# Patient Record
Sex: FEMALE | Race: WHITE | NOT HISPANIC OR LATINO | Employment: FULL TIME | URBAN - METROPOLITAN AREA
[De-identification: names, ages, dates, MRNs, and addresses within clinical notes are randomized per-mention and may not be internally consistent; named-entity substitution may affect disease eponyms.]

---

## 2017-01-31 ENCOUNTER — CONVERSION ENCOUNTER (OUTPATIENT)
Dept: GENERAL RADIOLOGY | Facility: HOSPITAL | Age: 47
End: 2017-01-31

## 2018-02-13 ENCOUNTER — CONVERSION ENCOUNTER (OUTPATIENT)
Dept: GENERAL RADIOLOGY | Facility: HOSPITAL | Age: 48
End: 2018-02-13

## 2018-04-02 ENCOUNTER — OFFICE VISIT CONVERTED (OUTPATIENT)
Dept: ORTHOPEDIC SURGERY | Facility: CLINIC | Age: 48
End: 2018-04-02
Attending: ORTHOPAEDIC SURGERY

## 2018-04-02 ENCOUNTER — CONVERSION ENCOUNTER (OUTPATIENT)
Dept: ORTHOPEDIC SURGERY | Facility: CLINIC | Age: 48
End: 2018-04-02

## 2018-07-05 ENCOUNTER — CONVERSION ENCOUNTER (OUTPATIENT)
Dept: NEUROLOGY | Facility: CLINIC | Age: 48
End: 2018-07-05

## 2018-07-05 ENCOUNTER — OFFICE VISIT CONVERTED (OUTPATIENT)
Dept: NEUROSURGERY | Facility: CLINIC | Age: 48
End: 2018-07-05
Attending: PHYSICIAN ASSISTANT

## 2018-08-16 ENCOUNTER — OFFICE VISIT CONVERTED (OUTPATIENT)
Dept: NEUROSURGERY | Facility: CLINIC | Age: 48
End: 2018-08-16
Attending: PHYSICIAN ASSISTANT

## 2018-11-05 ENCOUNTER — OFFICE VISIT CONVERTED (OUTPATIENT)
Dept: FAMILY MEDICINE CLINIC | Facility: CLINIC | Age: 48
End: 2018-11-05
Attending: NURSE PRACTITIONER

## 2018-12-03 ENCOUNTER — OFFICE VISIT CONVERTED (OUTPATIENT)
Dept: FAMILY MEDICINE CLINIC | Facility: CLINIC | Age: 48
End: 2018-12-03
Attending: NURSE PRACTITIONER

## 2019-02-05 ENCOUNTER — OFFICE VISIT CONVERTED (OUTPATIENT)
Dept: FAMILY MEDICINE CLINIC | Facility: CLINIC | Age: 49
End: 2019-02-05
Attending: NURSE PRACTITIONER

## 2019-02-06 ENCOUNTER — HOSPITAL ENCOUNTER (OUTPATIENT)
Dept: LAB | Facility: HOSPITAL | Age: 49
Discharge: HOME | End: 2019-02-06
Attending: NURSE PRACTITIONER

## 2019-02-06 LAB
APPEARANCE UR: ABNORMAL
BILIRUB UR QL: ABNORMAL
COLOR UR: ABNORMAL
CONV BACTERIA: NEGATIVE
CONV COLLECTION SOURCE (UA): ABNORMAL
CONV CRYSTALS: ABNORMAL /[HPF]
CONV HYALINE CASTS IN URINE MICRO: ABNORMAL /[LPF]
CONV UROBILINOGEN IN URINE BY AUTOMATED TEST STRIP: 0.2 {EHRLICHU}/DL (ref 0.1–1)
EST. AVERAGE GLUCOSE BLD GHB EST-MCNC: 183 MG/DL
GLUCOSE UR QL: 500 MG/DL
HBA1C MFR BLD: 8 % (ref 3.5–5.7)
HGB UR QL STRIP: ABNORMAL
KETONES UR QL STRIP: ABNORMAL MG/DL
LEUKOCYTE ESTERASE UR QL STRIP: NEGATIVE
NITRITE UR QL STRIP: NEGATIVE
PH UR STRIP.AUTO: 5 [PH] (ref 5–8)
PROT UR QL: NEGATIVE MG/DL
RBC #/AREA URNS HPF: ABNORMAL /[HPF]
SP GR UR: 1.03 (ref 1–1.03)
SQUAMOUS SPT QL MICRO: ABNORMAL /[HPF]
WBC #/AREA URNS HPF: ABNORMAL /[HPF]

## 2019-02-08 LAB — BACTERIA UR CULT: NORMAL

## 2019-02-15 ENCOUNTER — HOSPITAL ENCOUNTER (OUTPATIENT)
Dept: GENERAL RADIOLOGY | Facility: HOSPITAL | Age: 49
Discharge: HOME | End: 2019-02-15
Attending: NURSE PRACTITIONER

## 2019-03-20 ENCOUNTER — OFFICE VISIT CONVERTED (OUTPATIENT)
Dept: FAMILY MEDICINE CLINIC | Facility: CLINIC | Age: 49
End: 2019-03-20
Attending: NURSE PRACTITIONER

## 2019-04-01 ENCOUNTER — HOSPITAL ENCOUNTER (OUTPATIENT)
Dept: LAB | Facility: HOSPITAL | Age: 49
Discharge: HOME | End: 2019-04-01
Attending: NURSE PRACTITIONER

## 2019-04-01 LAB
APPEARANCE UR: CLEAR
BILIRUB UR QL: NEGATIVE
COLOR UR: YELLOW
CONV COLLECTION SOURCE (UA): ABNORMAL
CONV UROBILINOGEN IN URINE BY AUTOMATED TEST STRIP: 0.2 {EHRLICHU}/DL (ref 0.1–1)
GLUCOSE UR QL: >=1000 MG/DL
HGB UR QL STRIP: NEGATIVE
KETONES UR QL STRIP: ABNORMAL MG/DL
LEUKOCYTE ESTERASE UR QL STRIP: NEGATIVE
NITRITE UR QL STRIP: NEGATIVE
PH UR STRIP.AUTO: 5 [PH] (ref 5–8)
PROT UR QL: NEGATIVE MG/DL
SP GR UR: 1.03 (ref 1–1.03)

## 2019-04-04 ENCOUNTER — HOSPITAL ENCOUNTER (OUTPATIENT)
Dept: LAB | Facility: HOSPITAL | Age: 49
Discharge: HOME | End: 2019-04-04
Attending: SPECIALIST

## 2019-04-04 LAB — CORTIS AM PEAK SERPL-MCNC: <1 UG/DL (ref 6–18.4)

## 2019-05-08 ENCOUNTER — OFFICE VISIT CONVERTED (OUTPATIENT)
Dept: FAMILY MEDICINE CLINIC | Facility: CLINIC | Age: 49
End: 2019-05-08
Attending: NURSE PRACTITIONER

## 2019-11-05 ENCOUNTER — HOSPITAL ENCOUNTER (OUTPATIENT)
Dept: LAB | Facility: HOSPITAL | Age: 49
Discharge: HOME | End: 2019-11-05
Attending: NURSE PRACTITIONER

## 2019-11-05 LAB
ALBUMIN SERPL-MCNC: 4.5 G/DL (ref 3.5–5)
ALBUMIN/GLOB SERPL: 1.9 {RATIO} (ref 1.4–2.6)
ALP SERPL-CCNC: 94 U/L (ref 42–98)
ALT SERPL-CCNC: 19 U/L (ref 10–40)
ANION GAP SERPL CALC-SCNC: 18 MMOL/L (ref 8–19)
APPEARANCE UR: ABNORMAL
AST SERPL-CCNC: 19 U/L (ref 15–50)
BILIRUB SERPL-MCNC: 1.3 MG/DL (ref 0.2–1.3)
BILIRUB UR QL: ABNORMAL
BUN SERPL-MCNC: 10 MG/DL (ref 5–25)
BUN/CREAT SERPL: 14 {RATIO} (ref 6–20)
CALCIUM SERPL-MCNC: 10 MG/DL (ref 8.7–10.4)
CASTS URNS QL MICRO: ABNORMAL /[LPF]
CHLORIDE SERPL-SCNC: 102 MMOL/L (ref 99–111)
CHOLEST SERPL-MCNC: 127 MG/DL (ref 107–200)
CHOLEST/HDLC SERPL: 3.8 {RATIO} (ref 3–6)
COLOR UR: ABNORMAL
CONV BACTERIA: NEGATIVE
CONV CO2: 24 MMOL/L (ref 22–32)
CONV COLLECTION SOURCE (UA): ABNORMAL
CONV CREATININE URINE, RANDOM: 322.2 MG/DL (ref 10–300)
CONV CRYSTALS: ABNORMAL /[HPF]
CONV MICROALBUM.,U,RANDOM: 21.1 MG/L (ref 0–20)
CONV TOTAL PROTEIN: 6.9 G/DL (ref 6.3–8.2)
CONV UROBILINOGEN IN URINE BY AUTOMATED TEST STRIP: 1 {EHRLICHU}/DL (ref 0.1–1)
CREAT UR-MCNC: 0.7 MG/DL (ref 0.5–0.9)
EST. AVERAGE GLUCOSE BLD GHB EST-MCNC: 148 MG/DL
GFR SERPLBLD BASED ON 1.73 SQ M-ARVRAT: >60 ML/MIN/{1.73_M2}
GLOBULIN UR ELPH-MCNC: 2.4 G/DL (ref 2–3.5)
GLUCOSE SERPL-MCNC: 133 MG/DL (ref 65–99)
GLUCOSE UR QL: NEGATIVE MG/DL
HBA1C MFR BLD: 6.8 % (ref 3.5–5.7)
HDLC SERPL-MCNC: 33 MG/DL (ref 40–60)
HGB UR QL STRIP: NEGATIVE
KETONES UR QL STRIP: ABNORMAL MG/DL
LDLC SERPL CALC-MCNC: 53 MG/DL (ref 70–100)
LEUKOCYTE ESTERASE UR QL STRIP: NEGATIVE
MICROALBUMIN/CREAT UR: 6.5 MG/G{CRE} (ref 0–35)
NITRITE UR QL STRIP: NEGATIVE
OSMOLALITY SERPL CALC.SUM OF ELEC: 291 MOSM/KG (ref 273–304)
PH UR STRIP.AUTO: 5 [PH] (ref 5–8)
POTASSIUM SERPL-SCNC: 3.8 MMOL/L (ref 3.5–5.3)
PROT UR QL: NEGATIVE MG/DL
RBC #/AREA URNS HPF: ABNORMAL /[HPF]
SODIUM SERPL-SCNC: 140 MMOL/L (ref 135–147)
SP GR UR: 1.03 (ref 1–1.03)
SQUAMOUS SPT QL MICRO: ABNORMAL /[HPF]
TRIGL SERPL-MCNC: 203 MG/DL (ref 40–150)
VLDLC SERPL-MCNC: 41 MG/DL (ref 5–37)
WBC #/AREA URNS HPF: ABNORMAL /[HPF]

## 2019-11-08 ENCOUNTER — OFFICE VISIT CONVERTED (OUTPATIENT)
Dept: FAMILY MEDICINE CLINIC | Facility: CLINIC | Age: 49
End: 2019-11-08
Attending: NURSE PRACTITIONER

## 2019-12-06 ENCOUNTER — HOSPITAL ENCOUNTER (OUTPATIENT)
Dept: LAB | Facility: HOSPITAL | Age: 49
Discharge: HOME | End: 2019-12-06
Attending: NURSE PRACTITIONER

## 2019-12-06 LAB
APPEARANCE UR: CLEAR
BILIRUB UR QL: NEGATIVE
COLOR UR: YELLOW
CONV COLLECTION SOURCE (UA): NORMAL
CONV UROBILINOGEN IN URINE BY AUTOMATED TEST STRIP: 0.2 {EHRLICHU}/DL (ref 0.1–1)
GLUCOSE UR QL: NEGATIVE MG/DL
HGB UR QL STRIP: NEGATIVE
KETONES UR QL STRIP: NEGATIVE MG/DL
LEUKOCYTE ESTERASE UR QL STRIP: NEGATIVE
NITRITE UR QL STRIP: NEGATIVE
PH UR STRIP.AUTO: 5.5 [PH] (ref 5–8)
PROT UR QL: NEGATIVE MG/DL
SP GR UR: 1.01 (ref 1–1.03)

## 2020-03-03 ENCOUNTER — HOSPITAL ENCOUNTER (OUTPATIENT)
Dept: GENERAL RADIOLOGY | Facility: HOSPITAL | Age: 50
Discharge: HOME | End: 2020-03-03
Attending: NURSE PRACTITIONER

## 2020-03-10 ENCOUNTER — OFFICE VISIT CONVERTED (OUTPATIENT)
Dept: GASTROENTEROLOGY | Facility: CLINIC | Age: 50
End: 2020-03-10
Attending: PHYSICIAN ASSISTANT

## 2020-03-10 ENCOUNTER — CONVERSION ENCOUNTER (OUTPATIENT)
Dept: GASTROENTEROLOGY | Facility: CLINIC | Age: 50
End: 2020-03-10

## 2020-05-06 LAB — SARS-COV-2 RNA SPEC QL NAA+PROBE: NOT DETECTED

## 2020-05-11 ENCOUNTER — HOSPITAL ENCOUNTER (OUTPATIENT)
Dept: GASTROENTEROLOGY | Facility: HOSPITAL | Age: 50
Setting detail: HOSPITAL OUTPATIENT SURGERY
Discharge: HOME | End: 2020-05-11
Attending: INTERNAL MEDICINE

## 2020-05-11 LAB — GLUCOSE BLD-MCNC: 125 MG/DL (ref 65–99)

## 2020-05-13 ENCOUNTER — HOSPITAL ENCOUNTER (OUTPATIENT)
Dept: LAB | Facility: HOSPITAL | Age: 50
Discharge: HOME | End: 2020-05-13
Attending: NURSE PRACTITIONER

## 2020-05-13 LAB
ALBUMIN SERPL-MCNC: 4.4 G/DL (ref 3.5–5)
ALBUMIN/GLOB SERPL: 1.9 {RATIO} (ref 1.4–2.6)
ALP SERPL-CCNC: 97 U/L (ref 42–98)
ALT SERPL-CCNC: 19 U/L (ref 10–40)
ANION GAP SERPL CALC-SCNC: 20 MMOL/L (ref 8–19)
APPEARANCE UR: ABNORMAL
AST SERPL-CCNC: 18 U/L (ref 15–50)
BILIRUB SERPL-MCNC: 0.92 MG/DL (ref 0.2–1.3)
BILIRUB UR QL: NEGATIVE
BUN SERPL-MCNC: 6 MG/DL (ref 5–25)
BUN/CREAT SERPL: 9 {RATIO} (ref 6–20)
CALCIUM SERPL-MCNC: 9.3 MG/DL (ref 8.7–10.4)
CHLORIDE SERPL-SCNC: 105 MMOL/L (ref 99–111)
CHOLEST SERPL-MCNC: 117 MG/DL (ref 107–200)
CHOLEST/HDLC SERPL: 3.7 {RATIO} (ref 3–6)
COLOR UR: ABNORMAL
CONV CO2: 22 MMOL/L (ref 22–32)
CONV COLLECTION SOURCE (UA): ABNORMAL
CONV CREATININE URINE, RANDOM: 316.4 MG/DL (ref 10–300)
CONV CRYSTALS: ABNORMAL /[HPF]
CONV MICROALBUM.,U,RANDOM: 13.1 MG/L (ref 0–20)
CONV TOTAL PROTEIN: 6.7 G/DL (ref 6.3–8.2)
CONV UROBILINOGEN IN URINE BY AUTOMATED TEST STRIP: 1 {EHRLICHU}/DL (ref 0.1–1)
CREAT UR-MCNC: 0.69 MG/DL (ref 0.5–0.9)
EST. AVERAGE GLUCOSE BLD GHB EST-MCNC: 126 MG/DL
GFR SERPLBLD BASED ON 1.73 SQ M-ARVRAT: >60 ML/MIN/{1.73_M2}
GLOBULIN UR ELPH-MCNC: 2.3 G/DL (ref 2–3.5)
GLUCOSE SERPL-MCNC: 120 MG/DL (ref 65–99)
GLUCOSE UR QL: NEGATIVE MG/DL
HBA1C MFR BLD: 6 % (ref 3.5–5.7)
HDLC SERPL-MCNC: 32 MG/DL (ref 40–60)
HGB UR QL STRIP: NEGATIVE
KETONES UR QL STRIP: ABNORMAL MG/DL
LDLC SERPL CALC-MCNC: 58 MG/DL (ref 70–100)
LEUKOCYTE ESTERASE UR QL STRIP: NEGATIVE
MICROALBUMIN/CREAT UR: 4.1 MG/G{CRE} (ref 0–35)
NITRITE UR QL STRIP: NEGATIVE
OSMOLALITY SERPL CALC.SUM OF ELEC: 295 MOSM/KG (ref 273–304)
PH UR STRIP.AUTO: 5 [PH] (ref 5–8)
POTASSIUM SERPL-SCNC: 4 MMOL/L (ref 3.5–5.3)
PROT UR QL: NEGATIVE MG/DL
RBC #/AREA URNS HPF: ABNORMAL /[HPF]
SODIUM SERPL-SCNC: 143 MMOL/L (ref 135–147)
SP GR UR: 1.02 (ref 1–1.03)
SQUAMOUS SPT QL MICRO: ABNORMAL /[HPF]
TRIGL SERPL-MCNC: 136 MG/DL (ref 40–150)
VLDLC SERPL-MCNC: 27 MG/DL (ref 5–37)
WBC #/AREA URNS HPF: ABNORMAL /[HPF]

## 2020-05-15 ENCOUNTER — OFFICE VISIT CONVERTED (OUTPATIENT)
Dept: FAMILY MEDICINE CLINIC | Facility: CLINIC | Age: 50
End: 2020-05-15
Attending: NURSE PRACTITIONER

## 2020-07-14 ENCOUNTER — OFFICE VISIT CONVERTED (OUTPATIENT)
Dept: ORTHOPEDIC SURGERY | Facility: CLINIC | Age: 50
End: 2020-07-14
Attending: ORTHOPAEDIC SURGERY

## 2020-09-03 ENCOUNTER — OFFICE VISIT CONVERTED (OUTPATIENT)
Dept: GASTROENTEROLOGY | Facility: CLINIC | Age: 50
End: 2020-09-03
Attending: INTERNAL MEDICINE

## 2020-10-29 ENCOUNTER — OFFICE VISIT CONVERTED (OUTPATIENT)
Dept: NEUROLOGY | Facility: CLINIC | Age: 50
End: 2020-10-29
Attending: NURSE PRACTITIONER

## 2020-11-02 ENCOUNTER — HOSPITAL ENCOUNTER (OUTPATIENT)
Dept: LAB | Facility: HOSPITAL | Age: 50
Discharge: HOME | End: 2020-11-02
Attending: NURSE PRACTITIONER

## 2020-11-02 LAB
ALBUMIN SERPL-MCNC: 4.2 G/DL (ref 3.5–5)
ALBUMIN/GLOB SERPL: 1.8 {RATIO} (ref 1.4–2.6)
ALP SERPL-CCNC: 96 U/L (ref 42–98)
ALT SERPL-CCNC: 17 U/L (ref 10–40)
ANION GAP SERPL CALC-SCNC: 14 MMOL/L (ref 8–19)
AST SERPL-CCNC: 23 U/L (ref 15–50)
BILIRUB SERPL-MCNC: 0.99 MG/DL (ref 0.2–1.3)
BUN SERPL-MCNC: 10 MG/DL (ref 5–25)
BUN/CREAT SERPL: 12 {RATIO} (ref 6–20)
CALCIUM SERPL-MCNC: 9.1 MG/DL (ref 8.7–10.4)
CHLORIDE SERPL-SCNC: 104 MMOL/L (ref 99–111)
CHOLEST SERPL-MCNC: 121 MG/DL (ref 107–200)
CHOLEST/HDLC SERPL: 3.7 {RATIO} (ref 3–6)
CONV CO2: 25 MMOL/L (ref 22–32)
CONV CREATININE URINE, RANDOM: 431.8 MG/DL (ref 10–300)
CONV MICROALBUM.,U,RANDOM: 18.8 MG/L (ref 0–20)
CONV TOTAL PROTEIN: 6.5 G/DL (ref 6.3–8.2)
CREAT UR-MCNC: 0.84 MG/DL (ref 0.5–0.9)
EST. AVERAGE GLUCOSE BLD GHB EST-MCNC: 123 MG/DL
GFR SERPLBLD BASED ON 1.73 SQ M-ARVRAT: >60 ML/MIN/{1.73_M2}
GLOBULIN UR ELPH-MCNC: 2.3 G/DL (ref 2–3.5)
GLUCOSE SERPL-MCNC: 89 MG/DL (ref 65–99)
HBA1C MFR BLD: 5.9 % (ref 3.5–5.7)
HDLC SERPL-MCNC: 33 MG/DL (ref 40–60)
LDLC SERPL CALC-MCNC: 66 MG/DL (ref 70–100)
MICROALBUMIN/CREAT UR: 4.4 MG/G{CRE} (ref 0–35)
OSMOLALITY SERPL CALC.SUM OF ELEC: 287 MOSM/KG (ref 273–304)
POTASSIUM SERPL-SCNC: 3.8 MMOL/L (ref 3.5–5.3)
SODIUM SERPL-SCNC: 139 MMOL/L (ref 135–147)
TRIGL SERPL-MCNC: 110 MG/DL (ref 40–150)
VLDLC SERPL-MCNC: 22 MG/DL (ref 5–37)

## 2020-11-16 ENCOUNTER — TELEMEDICINE CONVERTED (OUTPATIENT)
Dept: FAMILY MEDICINE CLINIC | Facility: CLINIC | Age: 50
End: 2020-11-16
Attending: NURSE PRACTITIONER

## 2021-02-02 ENCOUNTER — OFFICE VISIT CONVERTED (OUTPATIENT)
Dept: NEUROLOGY | Facility: CLINIC | Age: 51
End: 2021-02-02
Attending: NURSE PRACTITIONER

## 2021-03-17 ENCOUNTER — HOSPITAL ENCOUNTER (OUTPATIENT)
Dept: LAB | Facility: HOSPITAL | Age: 51
Discharge: HOME | End: 2021-03-17
Attending: NURSE PRACTITIONER

## 2021-03-17 LAB
T4 FREE SERPL-MCNC: 1.1 NG/DL (ref 0.9–1.8)
TSH SERPL-ACNC: 3.31 M[IU]/L (ref 0.27–4.2)

## 2021-05-10 NOTE — H&P
History and Physical      Patient Name: Laila Bell   Patient ID: [id number]   Sex: Female   YOB: 1970    Primary Care Provider: Mychal HEAD    Visit Date: October 29, 2020    Provider: SONIDO Voss   [facility]: [facility]   Location Address: [address] [place] [address]  [id number]   Location Phone: [phone]          Chief Complaint  · Migraines      History Of Present Illness  Laila Bell is a 49 year old /White female who presents today to [facility] today referred from Mychal HEAD for evaluation of headaches.   She reports that she developed headaches many years ago. Since that time, her headaches have progressively worsened.   Currently, she reports headaches that are located frontal and retro-orbitally. She characterizes the headaches as 8/10 in severity, throbbing in nature with associated photophobia and phonophobia. Her headaches last 8-10 hours. She reports 15 headache days per month. She denies associated aura. She denies focal numbness, weakness, speech, and vision changes.   Triggers: Stress, Inadequate sleep, Weather, Lights, and odor   Symptoms improved by: Dark quiet room and Sleep   She states she is sleeping fairly well. Reports getting 6-7 hours of sleep per night. Denies snoring. Reports refreshing sleep.   Prior prophylactic medications include: Topamax, amitriptyline, Inderal, Depakote, aimovig, emgality   She uses abortive therapy such as: Imitrex, maxalt, ibuprofen, Excedrin, tylenol   Caffeine Use: minimal   Independently Reviewed: MRI brain and Prior PCP records   History of Kidney Stones: No   She denies a family history of cerebral aneurysm.       Past Medical History  Abnormal ECG; Allergic rhinitis; Bursitis, left hip; Colon Polyps; Diabetes mellitus, type 2; Gastroesophageal Reflux Disorder; Hyperlipidemia; Hypertension; IBS (irritable bowel syndrome); Migraine; Pain of left hip  joint; Screening Mammogram; Sinus trouble; Tendinitis, left hip         Past Surgical History  Colonoscopy; EGD; Gallbladder; Hysterectomy; Tonsilectomy         Medication List  dicyclomine 20 mg oral tablet; Emgality Syringe 120 mg/mL subcutaneous syringe; FreeStyle Lite Meter miscellaneous kit; Imitrex 100 mg oral tablet; Lipitor 10 mg oral tablet; Ozempic 1 mg/dose (2 mg/1.5 mL) subcutaneous pen injector; Toprol XL 25 mg oral tablet extended release 24 hr         Allergy List  Trulicity       Allergies Reconciled  Family Medical History  Liver Neoplasm, Malignant; Heart Disease; Diabetes, unspecified type; - No Family History of Colorectal Cancer; Lung cancer; *No Known Family History; Family history of Arthritis; Family history of heart disease         Social History  *Denies Alcohol Use; Alcohol (Never); Alcohol Use (Never); Claustophobic (Unknown); lives with spouse; ; .; Recreational Drug Use (Never); Tobacco (Never); Working         Immunizations  Name Date Admin   Influenza Refused         Review of Systems  · Constitutional  o Denies  o : chills, excessive sweating, fatigue, fever, sycope/passing out, weight gain, weight loss  · Eyes  o Denies  o : changes in vision, blurry vision, double vision  · HENT  o Admits  o : headaches  o Denies  o : loss of hearing, ringing in the ears, ear aches, sore throat, nasal congestion, sinus pain, nose bleeds, seasonal allergies  · Cardiovascular  o Denies  o : blood clots, swollen legs, anemia, easy burising or bleeding, transfusions  · Respiratory  o Denies  o : shortness of breath, dry cough, productive cough, pneumonia, COPD  · Gastrointestinal  o Denies  o : difficulty swallowing, reflux  · Genitourinary  o Denies  o : incontinence  · Neurologic  o Admits  o : headache  o Denies  o : seizure, stroke, tremor, loss of balance, falls, dizziness/vertigo, difficulty with sleep, numbness/tingling/paresthesia , difficulty with coordination, difficulty with  "dexterity, weakness  · Musculoskeletal  o Denies  o : neck stiffness/pain, swollen lymph nodes, muscle aches, joint pain, weakness, spasms, sciatica, pain radiating in arm, pain radiating in leg, low back pain  · Endocrine  o Denies  o : diabetes, thyroid disorder  · Psychiatric  o Denies  o : anxiety, depression      Vitals  Date Time BP Position Site L\R Cuff Size HR RR TEMP (F) WT  HT  BMI kg/m2 BSA m2 O2 Sat FR L/min FiO2 HC       10/29/2020 03:25 /79 Sitting    69 - R  96.2 149lbs 8oz 5'  2\" 27.34 1.72             Physical Examination  · Constitutional  o Appearance  o : well-nourished, well groomed, in no apparent distress  · Eyes  o Pupils and Irises  o : Pupils equal, round, and reactive to light and accommodation bilaterally  · Respiratory  o Auscultation of Lungs  o : Lungs were clear to ascultation bilaterally. No wheezes, rhonchi or rales were appreciated.  · Cardiovascular  o Heart  o :   § Auscultation of Heart  § : Regular rate and rhythm, no murmurs, gallops or rubs were appreciated.  o Peripheral Vascular System  o :   § Extremities  § : No peripheral edema was appreciated  · Musculoskeletal  o General  o : Normal bulk and normal tone.  · Neurologic  o Mental Status Examination  o :   § Orientation  § : Alert and oriented to person, place, and time,  § Speech/Language  § : Intact naming, comprehension, and repetition. No dysarthria.  § Memory  § : Immediate recall is 3/3. Recall at 5 minutes is 3/3.   § Attention  § : Attention span is intact to serial 7 examination and finger tapping.   § Fund of Knowledge  § : Adequate fund of knowledge  o Cranial Nerves  o : Pupils are equal, round and reactive to light. Extraocular movements are intact. Visual fields are full. Fundoscopic examination reveals sharp disc bilaterally. Sensation in the V1-V3 distribution is intact and symmetric. Muscles of mastication are strong and symmetric. Muscles of facial expression are strong and symmetric. Hearing is " intact. Palatal raise is intact and symmetric. Uvula is midline. Shoulder shrug is strong. Tongue protrudes in the midline.  o Motor Examination  o :   § RUE Strength  § : strength normal  § LUE Strength  § : strength normal  § RLE Strength  § : strength normal  § LLE Strength  § : strength normal  o Reflexes  o : 2+ reflexes throughout and symmetric. Negative Barron. Negative Babinski.   o Sensation  o : Intact sensation to light touch, pinprick, vibration and proprioception throughout.  o Gait and Station  o :   § Gait Screening  § : Normal, narrow based gait, with a normal arm swing.  o Coordination  o : Intact finger to nose and heel to shin. Rapid alternating movements are intact in the upper and lower extremities.          Assessment  · Chronic migraine without aura or status migrainosus     346.70/G43.709  Will continue Emgality for preventative therapy of migraine and start Nurtec ODT for abortive therapy.       Plan  · Medications  o Nurtec ODT 75 mg oral tablet,disintegrating   SIG: Dissolve 1 tablet on top of tongue then swallow. Max 1 tablet/day   DISP: (8) Tablet with 3 refills  Prescribed on 10/29/2020     o Emgality Syringe 120 mg/mL subcutaneous syringe   SIG: inject 1 milliliter (120 mg) by subcutaneous route once a month in the abdomen, thigh, outer upper arm, or buttocks for 30 days   DISP: (1) Milliliter with 5 refills  Adjusted on 10/29/2020     o Medications have been Reconciled  o Transition of Care or Provider Policy  · Instructions  o Encouraged to follow-up with Primary Care Provider for preventative care.  o Call or Return if symptoms worsen or persist.  o Follow up in 3 months.  · Disposition  o Call or Return if symptoms worsen or persist.            Electronically Signed by: SONIDO Voss -Author on October 30, 2020 10:23:51 AM

## 2021-05-10 NOTE — H&P
History and Physical      Patient Name: Laila Bell   Patient ID: [id number]   Sex: Female   YOB: 1970    Primary Care Provider: Mychal HEAD   Referring Provider: Mychal HEAD    Visit Date: July 14, 2020    Provider: Chaim Ng MD   Location: [facility]   Location Address: [address] [place] [address]  [id number]   Location Phone: [phone]          Chief Complaint  · Left Elbow Pain.      History Of Present Illness  Laila Bell is a 49 year old /White female who presents today for evaluation of her left elbow. She has had left elbow pain for several weeks. She thinks she may have hit the elbow while she was moving something, but pain is mostly to the posteromedial elbow. She also has some paresthesias and dysesthesias of the forearm skin medially. No previous injury. She does have diabetes, but it has been fairly well controlled recently. There has been no other procedures to the elbow. She has tried some Motrin that has helped some, as well as some cool and compressive wrap, which did not help.       Past Medical History  Abnormal ECG; Allergic rhinitis; Bursitis, left hip; Colon Polyps; Diabetes; Diabetes mellitus, type 2; Essential hypertension; Gastroesophageal Reflux Disorder; GERD (gastroesophageal reflux disease); High cholesterol; Hyperlipemia; Hyperlipidemia; Hypertension; IBS (irritable bowel syndrome); Irritable bowel syndrome; Migraine; Pain of left hip joint; Reflux; Screening Mammogram; Sinus trouble; Tendinitis, left hip         Past Surgical History  Cholecystectomy; Colonoscopy; EGD; Gallbladder; Hysterectomy; Tonsilectomy; Tonsillectomy         Medication List  diclofenac sodium 75 mg oral tablet,delayed release (DR/EC); dicyclomine 20 mg oral tablet; Emgality Pen 120 mg/mL subcutaneous pen injector; FreeStyle Lite Meter miscellaneous kit; Imitrex 100 mg oral tablet; Lipitor 10 mg oral tablet; lisinopril 2.5 mg oral tablet; MoviPrep  "100-7.5-2.691 gram oral powder in packet; Ozempic 1 mg/dose (2 mg/1.5 mL) subcutaneous pen injector; Prevacid 30 mg oral capsule,delayed release(DR/EC); Toprol XL 25 mg oral tablet extended release 24 hr         Allergy List  NO KNOWN DRUG ALLERGIES; Trulicity         Family Medical History  Liver Neoplasm, Malignant; Heart Disease; Diabetes, unspecified type; Lung cancer; *No Known Family History; Family history of Arthritis; Family history of heart disease         Social History  *Denies Alcohol Use; Alcohol (Never); Alcohol Use (Never); Claustophobic (Unknown); lives with spouse; ; .; Recreational Drug Use (Never); Tobacco (Never); Working         Review of Systems  · Constitutional  o Denies  o : fever, chills, weight loss  · Cardiovascular  o Denies  o : chest pain, shortness of breath  · Gastrointestinal  o Denies  o : liver disease, heartburn, nausea, blood in stools  · Genitourinary  o Denies  o : painful urination, blood in urine  · Integument  o Denies  o : rash, itching  · Neurologic  o Denies  o : headache, weakness, loss of consciousness  · Musculoskeletal  o Admits  o : painful, swollen joints  · Psychiatric  o Denies  o : drug/alcohol addiction, anxiety, depression      Vitals  Date Time BP Position Site L\R Cuff Size HR RR TEMP (F) WT  HT  BMI kg/m2 BSA m2 O2 Sat        07/14/2020 01:13 PM      84 - R   155lbs 16oz 5'  2\" 28.53 1.76 99 %          Physical Examination  · Constitutional  o Appearance  o : well developed, well-nourished, no obvious deformities present  · Head and Face  o Head  o :   § Inspection  § : normocephalic  o Face  o :   § Inspection  § : no facial lesions  · Eyes  o Conjunctivae  o : conjunctivae normal  o Sclerae  o : sclerae white  · Ears, Nose, Mouth and Throat  o Ears  o :   § External Ears  § : appearance within normal limits  § Hearing  § : intact  o Nose  o :   § External Nose  § : appearance normal  · Neck  o Inspection/Palpation  o : normal " appearance  o Range of Motion  o : full range of motion  · Respiratory  o Respiratory Effort  o : breathing unlabored  o Inspection of Chest  o : normal appearance  o Auscultation of Lungs  o : no audible wheezing or rales  · Cardiovascular  o Heart  o : regular rate  · Gastrointestinal  o Abdominal Examination  o : soft and non-tender  · Skin and Subcutaneous Tissue  o General Inspection  o : intact, no rashes  · Psychiatric  o General  o : Alert and oriented x3  o Judgement and Insight  o : judgment and insight intact  o Mood and Affect  o : mood normal, affect appropriate  · Left Elbow  o Inspection  o : Tender to palpation medial epicondyle. Tender to palpation medial flexor pronator muscles and olecranon. No significant swelling or effusion. No tenderness laterally. Range of motion is intact, flexion, extension, pronation, supination. Strength mildly limited. No pain with resisted flexion or pronation. Sensation intact to light touch. Positive pulses.   · In Office Procedures  o View  o : AP/LATERAL  o Site  o : left elbow  o Indication  o : Left elbow pain  o Study  o : X-rays ordered, taken in the office, and reviewed today.  o Xray  o : Normal X-rays. No fracture, subluxation, dislocation. No osseous lesion or effusion.  o Comparative Data  o : No comparative data found.           Assessment  · Pain: Elbow, Left     719.42/M25.529  · Left Elbow Tendinitis     726.90/M77.9      Plan  · Orders  o Elbow (Left) 2 views X-Ray [facility] ([id number]) - 719.42/M25.529 - 07/14/2020  · Medications  o Medications have been Reconciled  o Transition of Care or Provider Policy  · Instructions  o Reviewed the patient's Past Medical, Social, and Family history as well as the ROS at today's visit, no changes.  o Call or return if worsening symptoms.  o This note was transcribed by Samra Martinez. jsb  o Recommended initial conservative treatment. Patient will try diclofenac 75 mg p.o. b.i.d. Follow up in 2 weeks, may consider MRI  versus injection if symptoms persist.             Electronically Signed by: Samra Martinez-, Other -Author on July 17, 2020 08:00:42 AM  Electronically Co-signed by: Chaim Ng MD -Reviewer on July 19, 2020 09:17:52 PM

## 2021-05-13 NOTE — PROGRESS NOTES
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  She has a history of mixed irritable bowel syndrome, GERD, history of colon polyps.  Her colonoscopy was normal.  Her upper endoscopy showed a small hiatal hernia.  She returns now stating that \"she is struggling\".  For a few months after her colonoscopy she developed predominantly with constipation but over the past 3 weeks she has developed significant episodes of fecal urgency with watery stool and 2 episodes of fecal incontinence which naturally had been quite stressful.  She does describe some underlying anxiety about having other episodes of fecal incontinence.  She is therefore using Imodium and Bentyl each morning before school and wearing undergarments just in case she has another accident.       Past Medical History  Abnormal ECG; Allergic rhinitis; Bursitis, left hip; Colon Polyps; Diabetes mellitus, type 2; Gastroesophageal Reflux Disorder; Hyperlipidemia; Hypertension; IBS (irritable bowel syndrome); Migraine; Pain of left hip joint; Screening Mammogram; Sinus trouble; Tendinitis, left hip         Past Surgical History  Colonoscopy; EGD; Gallbladder; Hysterectomy; Tonsilectomy         Medication List  diclofenac sodium 75 mg oral tablet,delayed release (DR/EC); dicyclomine 20 mg oral tablet; Emgality Pen 120 mg/mL subcutaneous pen injector; FreeStyle Lite Meter miscellaneous kit; Imitrex 100 mg oral tablet; Lipitor " "10 mg oral tablet; lisinopril 2.5 mg oral tablet; Ozempic 1 mg/dose (2 mg/1.5 mL) subcutaneous pen injector; Prevacid 30 mg oral capsule,delayed release(DR/EC); Toprol XL 25 mg oral tablet extended release 24 hr         Allergy List  Trulicity       Allergies Reconciled  Family Medical History  Liver Neoplasm, Malignant; Heart Disease; Diabetes, unspecified type; - No Family History of Colorectal Cancer; Lung cancer; *No Known Family History; Family history of Arthritis; Family history of heart disease         Social History  *Denies Alcohol Use; Alcohol (Never); Alcohol Use (Never); Claustophobic (Unknown); lives with spouse; ; .; Recreational Drug Use (Never); Tobacco (Never); Working         Immunizations  Name Date Admin   Influenza Refused         Review of Systems  · Constitutional  o Denies  o : chills, fever  · Cardiovascular  o Denies  o : exertional chest pain  · Respiratory  o Denies  o : shortness of breath  · Gastrointestinal  o Denies  o : nausea, vomiting, dysphagia  · Endocrine  o Denies  o : weight gain, weight loss      Vitals  Date Time BP Position Site L\R Cuff Size HR RR TEMP (F) WT  HT  BMI kg/m2 BSA m2 O2 Sat HC       09/03/2020 03:11 /79 Sitting    91 - R 12  152lbs 0oz 5'  2\" 27.8 1.74 100 %          Physical Examination  · Constitutional  o Appearance  o : Healthy-appearing, awake and alert in no acute distress  · Head and Face  o Head  o : Normocephalic with no worriesome skin lesions  · Eyes  o Vision  o :   § Visual Fields  § : eyes move symmetrical in all directions  o Sclerae  o : sclerae anicteric  · Neck  o Inspection/Palpation  o : Trachea is midline, no adenopathy  · Respiratory  o Respiratory Effort  o : Breathing is unlabored.  o Inspection of Chest  o : normal appearance  o Auscultation of Lungs  o : Chest is clear to auscultation bilaterally.  · Cardiovascular  o Heart  o :   § Auscultation of Heart  § : no murmurs, rubs, or gallops  o Peripheral Vascular " System  o :   § Extremities  § : no cyanosis, clubbing or edema;   · Gastrointestinal  o Abdominal Examination  o : Abdomen is soft, nontender to palpation, with normal active bowel sounds, no appreciable hepatosplenomegaly.          Assessment  · Altered Bowel Habits     787.99/R19.4  · Irritable Bowel Syndrome     564.1/K58.9  · Generalized anxiety disorder     300.02/F41.1  · Fecal incontinence     787.60/R15.9      Plan  · Medications  o Medications have been Reconciled  o Transition of Care or Provider Policy  · Instructions  o The patient has a history of irritable bowel syndrome and previous had constipation and even had difficulty with complete cleanout for her colonoscopy but more recently has had episodes of diarrhea with fecal incontinence. I suspect she has underlying anxiety which may be contributing to some of her symptoms and rather than try to treat either constipation or diarrhea we will give her a trial of Zoloft 50 mg p.o. daily. We have discussed the risk profile and I also encouraged her to discuss this issue further with her primary care physician. She will follow-up with me in 2 months. It is okay for her to continue using Imodium and Bentyl.            Electronically Signed by: Jose David Berry MD -Author on September 3, 2020 04:06:17 PM

## 2021-05-13 NOTE — PROGRESS NOTES
Progress Note      Patient Name: Laila Bell   Patient ID: [id number]   Sex: Female   YOB: 1970    Primary Care Provider: Mychal HEAD   Referring Provider: Mychal HEAD    Visit Date: November 16, 2020    Provider: SONIDO Rangel   [place] [facility]   [place] Address: [address] [place] [address], [address] [place] [address]  [id number]   Location Phone: [phone]          Chief Complaint  · f/u migraines  · f/u htn  · f/u diabetes      History Of Present Illness  Video Conferencing Visit  Laila Bell is a 49 year old /White female who is presenting for evaluation via video conferencing via Zoom. Verbal consent obtained before beginning visit.   The following staff were present during this visit: Rolf BENNETT, Mychal HEAD   Laila Bell is a 49 year old /White female who presents for evaluation and treatment of:      Patient doing well her A1c was 5.9.  Her microalbumin was still little bit elevated.  So we will get her on a low-dose blood pressure medicine.    Headaches, she is doing well on her Emgality.  Imitrex works but sometimes takes a while to kick in.  However she is tried Maxalt in the past and it worked about the same.  And she has tried Nurtec through her neurologist but it did not work as well.    Hypertension and hyperlipidemia: Patient doing well on her medication not having any issues.       Past Medical History  Disease Name Date Onset Notes   Abnormal ECG 08/16/2016 --    Allergic rhinitis --  --    Bursitis, left hip 04/06/2018 --    Colon Polyps --  --    Diabetes mellitus, type 2 05/08/2019 --    Gastroesophageal Reflux Disorder --  --    Hyperlipidemia --  --    Hypertension --  --    IBS (irritable bowel syndrome) --  --    Migraine --  --    Pain of left hip joint 04/06/2018 --    Screening Mammogram 2/2019 2020 scheduled   Sinus trouble --  --    Tendinitis, left hip 04/06/2018 --          Past  Surgical History  Procedure Name Date Notes   Colonoscopy 2015 2020 --    EGD 2020 --    Gallbladder --  --    Hysterectomy --  --    Tonsilectomy --  --          Medication List  Name Date Started Instructions   dicyclomine 20 mg oral tablet 03/10/2020 take 1 tablet (20 mg) by oral route 3 times per day prn   Emgality Syringe 120 mg/mL subcutaneous syringe 10/29/2020 inject 1 milliliter (120 mg) by subcutaneous route once a month in the abdomen, thigh, outer upper arm, or buttocks for 30 days   FreeStyle Lite Meter miscellaneous kit 11/05/2018 use as directed glucometer that is on insurance to be tested once a day. Strips and lancets   Imitrex 100 mg oral tablet 03/17/2020 take 1 tablet by oral route 1X repeatin 2 hours if needed no more than2 in 24   Lipitor 10 mg oral tablet 11/11/2020 take 1 tablet (10 mg) by oral route once daily at bedtime for 90 days   Nurtec ODT 75 mg oral tablet,disintegrating 10/29/2020 Dissolve 1 tablet on top of tongue then swallow. Max 1 tablet/day   Ozempic 1 mg/dose (2 mg/1.5 mL) subcutaneous pen injector 07/28/2020 INJECT 1 MG SUBCUTANEOUSLY EVERY WEEK ON THE SAME DAY IN THE ABDOMEN,THIGHS OR UPPER ARM (ROTATE INJECTION SITE)   Toprol XL 25 mg oral tablet extended release 24 hr 07/13/2020 take 1 tablet by oral route 2 times a day for 90 days         Allergy List  Allergen Name Date Reaction Notes   Trulicity Aug 16 2019 12:00AM itching --        Allergies Reconciled  Family Medical History  Disease Name Relative/Age Notes   Liver Neoplasm, Malignant  --    Heart Disease Father/   Father   Diabetes, unspecified type Father/   Father   - No Family History of Colorectal Cancer  --    Lung cancer  --    *No Known Family History  --    Family history of Arthritis Father/  Mother/   Mother; Father   Family history of heart disease Father/   Father         Social History  Finding Status Start/Stop Quantity Notes   *Denies Alcohol Use --  --/-- --  --    Alcohol Never --/-- --  does not  drink   Alcohol Use Never --/-- --  does not drink   Claustophobic Unknown --/-- --  yes   lives with spouse --  --/-- --  --     --  --/-- --  --    . --  --/-- --  --    Recreational Drug Use Never --/-- --  no   Tobacco Never --/-- --  never smoker   Working --  --/-- --  --          Immunizations  NameDate Admin Mfg Trade Name Lot Number Route Inj VIS Given VIS Publication   InfluenzaRefused 11/16/2020 NE Not Entered  NE NE     Comments:          Review of Systems  · Constitutional  o Denies  o : fever, fatigue, weight loss, weight gain  · Cardiovascular  o Denies  o : lower extremity edema, claudication, chest pressure, palpitations  · Respiratory  o Denies  o : shortness of breath, wheezing, cough, hemoptysis, dyspnea on exertion  · Gastrointestinal  o Denies  o : nausea, vomiting, diarrhea, constipation, abdominal pain      Physical Examination  · Constitutional  o Appearance  o : well-nourished, well developed, alert, in no acute distress  · Eyes  o Conjunctivae  o : conjunctivae normal  o Sclerae  o : sclerae white  o Pupils and Irises  o : pupils equal, round, and reactive to light and accommodation bilaterally  o Corneas  o : tear film normal, no lesions present  o Eyelids/Ocular Adnexae  o : eyelid appearance normal, no exudates present, eye moisture level normal  · Respiratory  o Respiratory Effort  o : breathing unlabored  o Inspection of Chest  o : normal appearance, no retractions  · Skin and Subcutaneous Tissue  o General Inspection  o : no rashes or lesions present, no areas of discoloration to the face  · Neurologic  o Mental Status Examination  o : judgement, insight intact, modd and affect appropriate          Assessment  · Diabetes mellitus, type 2     250.00/E11.9  · Essential hypertension     401.9/I10  · Headache     784.0/R51  · Hyperlipidemia     272.4/E78.5      Plan  · Orders  o Diabetes 2 Panel (Urine Microalbumin, CMP, Lipid, A1c, ) [facility] (35194, 26456, 03889, 07212) -  250.00/E11.9 - 06/16/2021  o ACO-39: Current medications updated and reviewed (1159F, ) - - 11/16/2020  o ACO-14: Influenza immunization was not administered for reasons documented [facility] () - - 11/16/2020  · Medications  o Medications have been Reconciled  o Transition of Care or Provider Policy  · Instructions  o Advised that cheeses and other sources of dairy fats, animal fats, fast food, and the extras (candy, pastries, pies, doughnuts and cookies) all contain LDL raising nutrients. Advised to increase fruits, vegetables, whole grains, and to monitor portion sizes.   o Patient was educated/instructed on their diagnosis, treatment and medications prior to discharge from the clinic today.  o Minutes spent with patient including greater than 50% in Education/Counseling/Care Coordination.  o Time spent with the patient was minutes, more than 50% face to face.  · Disposition  o Return in 6 months            Electronically Signed by: SONIDO Rangel -Author on November 16, 2020 04:43:20 PM

## 2021-05-13 NOTE — PROGRESS NOTES
Progress Note      Patient Name: Laila Bell   Patient ID: [id number]   Sex: Female   YOB: 1970    Primary Care Provider: Mychal HEAD   Referring Provider: Mychal HEAD    Visit Date: May 15, 2020    Provider: SONIDO Rangel   Location: [facility]   Location Address: [address] [place] [address], [address] [place] [address]  [id number]   Location Phone: [phone]          Chief Complaint     Follow up DM2, GERD, headaches  Discuss lab results       History Of Present Illness  Laila Bell is a 49 year old /White female who presents for evaluation and treatment of:      Diabetes: Patient is doing really well on her medications.  Ozempic is working really well for her besides possible constipation she is tolerating it well.  She has an A1c of 6 today.      Had a colonoscopy done beginning of the week waiting the results of that.  Said that she had some polyps and will have to repeat it in 3 years.  Her reflux is doing well.    Headaches: Not having any issues.    Hypertension doing well on her Toprol.  Blood pressure was 124/74 today.    Patient denies Common Covid symptoms:  headache, fever of at least 100 degrees, congestion, shortness of breath, N&V, abdominal pain, loss of taste or smell, chills or muscle aches.       Past Medical History  Abnormal ECG; Allergic rhinitis; Bursitis, left hip; Colon Polyps; Diabetes; Diabetes mellitus, type 2; Essential hypertension; Gastroesophageal Reflux Disorder; GERD (gastroesophageal reflux disease); High cholesterol; Hyperlipidemia; Hypertension; IBS (irritable bowel syndrome); Irritable bowel syndrome; Migraine; Pain of left hip joint; Screening Mammogram; Sinus trouble; Tendinitis, left hip         Past Surgical History  Cholecystectomy; Colonoscopy; EGD; Gallbladder; Hysterectomy; Tonsilectomy         Medication List  dicyclomine 20 mg oral tablet; FreeStyle Lite Meter miscellaneous kit; Imitrex 100 mg oral tablet; Lipitor  "10 mg oral tablet; MoviPrep 100-7.5-2.691 gram oral powder in packet; Ozempic 1 mg/dose (2 mg/1.5 mL) subcutaneous pen injector; Prevacid 30 mg oral capsule,delayed release(DR/EC); Toprol XL 25 mg oral tablet extended release 24 hr         Allergy List  Trulicity         Family Medical History  Liver Neoplasm, Malignant; Heart Disease; Lung cancer; *No Known Family History; Family history of heart disease         Social History  *Denies Alcohol Use; Alcohol (Never); lives with spouse; ; Tobacco (Never); Working         Immunizations  Name Date Admin   Influenza Refused         Review of Systems  · Constitutional  o Denies  o : fever, fatigue, weight loss, weight gain  · Cardiovascular  o Denies  o : lower extremity edema, claudication, chest pressure, palpitations  · Respiratory  o Denies  o : shortness of breath, wheezing, cough, hemoptysis, dyspnea on exertion  · Gastrointestinal  o Denies  o : nausea, vomiting, diarrhea, constipation, abdominal pain      Vitals  Date Time BP Position Site L\R Cuff Size HR RR TEMP (F) WT  HT  BMI kg/m2 BSA m2 O2 Sat        05/15/2020 10:40 /74 Sitting    89 - R 18 97.1 156lbs 16oz 5'  2\" 28.72 1.76 99 %          Physical Examination  · Constitutional  o Appearance  o : well-nourished, well developed, alert, in no acute distress  · Eyes  o Conjunctivae  o : conjunctivae normal  o Sclerae  o : sclerae white  o Pupils and Irises  o : pupils equal, round, and reactive to light and accommodation bilaterally  o Corneas  o : tear film normal, no lesions present  o Eyelids/Ocular Adnexae  o : eyelid appearance normal, no exudates present, eye moisture level normal  · Respiratory  o Respiratory Effort  o : breathing unlabored  o Inspection of Chest  o : normal appearance, no retractions  o Auscultation of Lungs  o : normal breath sounds throughout  · Cardiovascular  o Heart  o :   § Auscultation of Heart  § : regular rate and rhythm without murmur, PMI normal  o Peripheral " Vascular System  o :   § Extremities  § : no cyanosis, clubbing or edema; less than 2 second refill noted  · Musculoskeletal  o General  o : No joint swelling or deformity noted. Muscle tone, strength and development grossly normal.  · Skin and Subcutaneous Tissue  o General Inspection  o : no rashes or lesions present, no areas of discoloration  · Neurologic  o Mental Status Examination  o : judgement, insight intact, modd and affect appropriate  o Motor Examination  o : strength grossly intact in all four extremities  o Gait and Station  o : normal gait, able to stand without difficulty          Assessment  · Screening for depression     V79.0/Z13.89  · Diabetes mellitus, type 2     250.00/E11.9  · Essential hypertension     401.9/I10  · GERD (gastroesophageal reflux disease)     530.81/K21.9  · Headache     784.0/R51      Plan  · Orders  o Annual depression screening, 15 minutes (, 47732) - V79.0/Z13.89 - 05/15/2020  o ACO-18: Negative screen for clinical depression using a standardized tool () - V79.0/Z13.89 - 05/15/2020  o Diabetes 2 Panel (Urine Microalbumin, CMP, Lipid, A1c, ) [facility] (72324, 36650, 51377, 77491) - 250.00/E11.9 - 11/15/2020  o Diabetic Foot (Motor and Sensory) Exam Completed [facility] (, , 2028F) - 250.00/E11.9 - 05/15/2020  o ACO-17: Screened for tobacco use AND received tobacco cessation intervention (4004F) - - 05/15/2020  o ACO-39: Current medications updated and reviewed () - - 05/15/2020  o ACO-14: Influenza immunization was not administered for reasons documented () - - 05/15/2020  · Medications  o lisinopril 2.5 mg oral tablet   SIG: take 1 tablet (2.5 mg) by oral route once daily for 90 days   DISP: (90) tablets with 1 refills  Prescribed on 05/15/2020     o Aimovig Autoinjector 140 mg/mL subcutaneous auto-injector   SIG: INJECT 140MG SUBCUTANEOUSLY IN THE ABDOMEN, THIGH, OR OUTER AREA OF THE UPPER ARM MONTHLY   DISP: (1) Milliliter with 5 refills  Discontinued on  05/15/2020     o Medications have been Reconciled  o Transition of Care or Provider Policy  · Instructions  o Depression Screen completed and scanned into the EMR under the designated folder within the patient's documents.  o Today's PHQ-9 result is __2_  o Patient was educated/instructed on their diagnosis, treatment and medications prior to discharge from the clinic today.  o Minutes spent with patient including greater than 50% in Education/Counseling/Care Coordination.  o Time spent with the patient was minutes, more than 50% face to face.  · Disposition  o Return in 6 months            Electronically Signed by: SONIDO Rangel -Author on May 15, 2020 04:35:44 PM

## 2021-05-14 VITALS
DIASTOLIC BLOOD PRESSURE: 74 MMHG | WEIGHT: 156 LBS | HEIGHT: 62 IN | BODY MASS INDEX: 28.71 KG/M2 | SYSTOLIC BLOOD PRESSURE: 122 MMHG

## 2021-05-14 VITALS
DIASTOLIC BLOOD PRESSURE: 79 MMHG | WEIGHT: 149.5 LBS | HEIGHT: 62 IN | TEMPERATURE: 96.2 F | HEART RATE: 69 BPM | SYSTOLIC BLOOD PRESSURE: 127 MMHG | BODY MASS INDEX: 27.51 KG/M2

## 2021-05-14 VITALS
SYSTOLIC BLOOD PRESSURE: 116 MMHG | HEIGHT: 62 IN | OXYGEN SATURATION: 100 % | HEART RATE: 91 BPM | DIASTOLIC BLOOD PRESSURE: 79 MMHG | WEIGHT: 152 LBS | BODY MASS INDEX: 27.97 KG/M2 | RESPIRATION RATE: 12 BRPM

## 2021-05-14 NOTE — PROGRESS NOTES
Progress Note      Patient Name: Laila Bell   Patient ID: [id number]   Sex: Female   YOB: 1970    Primary Care Provider: Mychal HEAD   Referring Provider: Mychal HEAD    Visit Date: February 2, 2021    Provider: SONIDO Voss   Location: [facility]   Location Address: [address] [place] [address]  [id number]   Location Phone: [phone]          Chief Complaint  · Migraines      History Of Present Illness  Laila Bell is a 50 year old /White female who presents today to [facility] today referred from Mychal HEAD for evaluation of headaches.   She reports that she developed headaches many years ago. Since that time, her headaches have progressively worsened.   Currently, she reports headaches that are located frontal and retro-orbitally. She characterizes the headaches as 8/10 in severity, throbbing in nature with associated photophobia and phonophobia. Her headaches last 8-10 hours. She reports 15 headache days per month. She denies associated aura. She denies focal numbness, weakness, speech, and vision changes.   Triggers: Stress, Inadequate sleep, Weather, Lights, and odor   Symptoms improved by: Dark quiet room and Sleep   She states she is sleeping fairly well. Reports getting 6-7 hours of sleep per night. Denies snoring. Reports refreshing sleep.   Prior prophylactic medications include: Topamax, amitriptyline, Inderal, Depakote, aimovig, emgality   She uses abortive therapy such as: Imitrex, maxalt, ibuprofen, Excedrin, tylenol   Caffeine Use: minimal   Independently Reviewed: MRI brain and Prior PCP records   History of Kidney Stones: No   She denies a family history of cerebral aneurysm.   Laila Bell is a 50 year old /White female who [facility] referred from Mychal HEAD for follow up. Doing well on Emgality. Having 4-6 headache days per month.  This is much improved. Did not have good results with Nurtec. Imitrex is effective for abortive therapy.       Past Medical History  Abnormal ECG; Allergic rhinitis; Bursitis, left hip; Colon Polyps; Diabetes mellitus, type 2; Gastroesophageal Reflux Disorder; Hyperlipidemia; Hypertension; IBS (irritable bowel syndrome); Migraine; Pain of left hip joint; Screening Mammogram; Sinus trouble; Tendinitis, left hip         Past Surgical History  Colonoscopy; EGD; Gallbladder; Hysterectomy; Tonsilectomy         Medication List  dicyclomine 20 mg oral tablet; Emgality Pen 120 mg/mL subcutaneous pen injector; FreeStyle Lite Meter miscellaneous kit; Lipitor 10 mg oral tablet; Ozempic 1 mg/dose (2 mg/1.5 mL) subcutaneous pen injector; SUMATRIPTAN SUCC 100 MG  Tablet; Toprol XL 25 mg oral tablet extended release 24 hr         Allergy List  Trulicity         Family Medical History  Liver Neoplasm, Malignant; Heart Disease; Diabetes, unspecified type; - No Family History of Colorectal Cancer; Lung cancer; *No Known Family History; Family history of Arthritis; Family history of heart disease         Social History  *Denies Alcohol Use; Alcohol (Never); Alcohol Use (Never); Claustophobic (Unknown); lives with spouse; ; .; Recreational Drug Use (Never); Tobacco (Never); Working         Immunizations  Name Date Admin   Influenza Refused   Influenza Refused         Review of Systems  · Constitutional  o Denies  o : chills, excessive sweating, fatigue, fever, sycope/passing out, weight gain, weight loss  · Eyes  o Denies  o : changes in vision, blurry vision, double vision  · HENT  o Admits  o : headaches  o Denies  o : loss of hearing, ringing in the ears, ear aches, sore throat, nasal congestion, sinus pain, nose bleeds, seasonal allergies  · Cardiovascular  o Denies  o : blood clots, swollen legs, anemia, easy burising or bleeding, transfusions  · Respiratory  o Denies  o : shortness of breath, dry cough,  "productive cough, pneumonia, COPD  · Gastrointestinal  o Denies  o : difficulty swallowing, reflux  · Genitourinary  o Denies  o : incontinence  · Neurologic  o Admits  o : headache  o Denies  o : seizure, stroke, tremor, loss of balance, falls, dizziness/vertigo, difficulty with sleep, numbness/tingling/paresthesia , difficulty with coordination, difficulty with dexterity, weakness  · Musculoskeletal  o Denies  o : neck stiffness/pain, swollen lymph nodes, muscle aches, joint pain, weakness, spasms, sciatica, pain radiating in arm, pain radiating in leg, low back pain  · Endocrine  o Denies  o : diabetes, thyroid disorder  · Psychiatric  o Denies  o : anxiety, depression      Vitals  Date Time BP Position Site L\R Cuff Size HR RR TEMP (F) WT  HT  BMI kg/m2 BSA m2 O2 Sat FR L/min FiO2 HC       02/02/2021 03:24 /74 Sitting       155lbs 16oz 5'  2\" 28.53 1.76             Physical Examination  · Constitutional  o Appearance  o : well-nourished, well groomed, in no apparent distress  · Eyes  o Pupils and Irises  o : Pupils equal, round, and reactive to light and accommodation bilaterally  · Respiratory  o Auscultation of Lungs  o : Lungs were clear to ascultation bilaterally. No wheezes, rhonchi or rales were appreciated.  · Cardiovascular  o Heart  o :   § Auscultation of Heart  § : Regular rate and rhythm, no murmurs, gallops or rubs were appreciated.  o Peripheral Vascular System  o :   § Extremities  § : No peripheral edema was appreciated  · Musculoskeletal  o General  o : Normal bulk and normal tone.  · Neurologic  o Mental Status Examination  o :   § Orientation  § : Alert and oriented to person, place, and time,  § Speech/Language  § : Intact naming, comprehension, and repetition. No dysarthria.  § Memory  § : Immediate recall is 3/3. Recall at 5 minutes is 3/3.   § Attention  § : Attention span is intact to serial 7 examination and finger tapping.   § Fund of Knowledge  § : Adequate fund of " knowledge  o Cranial Nerves  o : Pupils are equal, round and reactive to light. Extraocular movements are intact. Visual fields are full. Fundoscopic examination reveals sharp disc bilaterally. Sensation in the V1-V3 distribution is intact and symmetric. Muscles of mastication are strong and symmetric. Muscles of facial expression are strong and symmetric. Hearing is intact. Palatal raise is intact and symmetric. Uvula is midline. Shoulder shrug is strong. Tongue protrudes in the midline.  o Motor Examination  o :   § RUE Strength  § : strength normal  § LUE Strength  § : strength normal  § RLE Strength  § : strength normal  § LLE Strength  § : strength normal  o Reflexes  o : 2+ reflexes throughout and symmetric. Negative Barron. Negative Babinski.   o Sensation  o : Intact sensation to light touch, pinprick, vibration and proprioception throughout.  o Gait and Station  o :   § Gait Screening  § : Normal, narrow based gait, with a normal arm swing.  o Coordination  o : Intact finger to nose and heel to shin. Rapid alternating movements are intact in the upper and lower extremities.          Assessment  · Chronic migraine without aura or status migrainosus     346.70/G43.709  Will continue Emgality for preventative therapy of migraine and Imitrex for abortive therapy.      Plan  · Medications  o Emgality Pen 120 mg/mL subcutaneous pen injector   SIG: inject 1mL (120 mg) by subcutaneous route once a month in the abdomen, thigh, outer upper arm, or buttocks   DISP: (1) Milliliter with 5 refills  Refilled on 02/02/2021     o Nurtec ODT 75 mg oral tablet,disintegrating   SIG: Dissolve 1 tablet on top of tongue then swallow. Max 1 tablet/day   DISP: (8) Tablet with 3 refills  Discontinued on 02/02/2021     o Medications have been Reconciled  o Transition of Care or Provider Policy  · Instructions  o Call or Return if symptoms persist.  o Follow up in 3 months.  · Disposition  o Call or Return if symptoms worsen or  persist.            Electronically Signed by: SONIDO Voss -Author on February 5, 2021 10:51:55 AM

## 2021-05-15 VITALS
HEART RATE: 76 BPM | WEIGHT: 157 LBS | BODY MASS INDEX: 28.89 KG/M2 | HEIGHT: 62 IN | SYSTOLIC BLOOD PRESSURE: 128 MMHG | OXYGEN SATURATION: 98 % | RESPIRATION RATE: 16 BRPM | DIASTOLIC BLOOD PRESSURE: 75 MMHG

## 2021-05-15 VITALS
DIASTOLIC BLOOD PRESSURE: 80 MMHG | HEIGHT: 62 IN | BODY MASS INDEX: 29.26 KG/M2 | HEART RATE: 95 BPM | WEIGHT: 159 LBS | RESPIRATION RATE: 16 BRPM | OXYGEN SATURATION: 100 % | TEMPERATURE: 97.7 F | SYSTOLIC BLOOD PRESSURE: 121 MMHG

## 2021-05-15 VITALS
WEIGHT: 171 LBS | RESPIRATION RATE: 16 BRPM | TEMPERATURE: 99.1 F | HEIGHT: 62 IN | OXYGEN SATURATION: 98 % | HEART RATE: 79 BPM | BODY MASS INDEX: 31.47 KG/M2 | DIASTOLIC BLOOD PRESSURE: 78 MMHG | SYSTOLIC BLOOD PRESSURE: 139 MMHG

## 2021-05-15 VITALS
TEMPERATURE: 98.2 F | OXYGEN SATURATION: 100 % | BODY MASS INDEX: 31.65 KG/M2 | RESPIRATION RATE: 16 BRPM | HEART RATE: 73 BPM | DIASTOLIC BLOOD PRESSURE: 68 MMHG | SYSTOLIC BLOOD PRESSURE: 117 MMHG | WEIGHT: 172 LBS | HEIGHT: 62 IN

## 2021-05-15 VITALS
HEIGHT: 62 IN | HEART RATE: 89 BPM | OXYGEN SATURATION: 99 % | DIASTOLIC BLOOD PRESSURE: 74 MMHG | RESPIRATION RATE: 18 BRPM | TEMPERATURE: 97.1 F | SYSTOLIC BLOOD PRESSURE: 124 MMHG | BODY MASS INDEX: 28.89 KG/M2 | WEIGHT: 157 LBS

## 2021-05-15 VITALS — WEIGHT: 156 LBS | OXYGEN SATURATION: 99 % | BODY MASS INDEX: 28.71 KG/M2 | HEART RATE: 84 BPM | HEIGHT: 62 IN

## 2021-05-16 VITALS
OXYGEN SATURATION: 100 % | DIASTOLIC BLOOD PRESSURE: 78 MMHG | RESPIRATION RATE: 18 BRPM | BODY MASS INDEX: 31.83 KG/M2 | HEART RATE: 84 BPM | TEMPERATURE: 97 F | WEIGHT: 173 LBS | HEIGHT: 62 IN | SYSTOLIC BLOOD PRESSURE: 134 MMHG

## 2021-05-16 VITALS
HEIGHT: 62 IN | SYSTOLIC BLOOD PRESSURE: 121 MMHG | DIASTOLIC BLOOD PRESSURE: 80 MMHG | RESPIRATION RATE: 16 BRPM | BODY MASS INDEX: 31.65 KG/M2 | WEIGHT: 172 LBS | OXYGEN SATURATION: 98 % | TEMPERATURE: 99.2 F | HEART RATE: 91 BPM

## 2021-05-16 VITALS — HEIGHT: 62 IN | HEART RATE: 74 BPM | WEIGHT: 177 LBS | BODY MASS INDEX: 32.57 KG/M2

## 2021-05-16 VITALS — OXYGEN SATURATION: 98 % | HEART RATE: 70 BPM | HEIGHT: 62 IN

## 2021-05-16 VITALS
OXYGEN SATURATION: 98 % | HEART RATE: 82 BPM | WEIGHT: 176 LBS | DIASTOLIC BLOOD PRESSURE: 39 MMHG | RESPIRATION RATE: 16 BRPM | BODY MASS INDEX: 31.18 KG/M2 | SYSTOLIC BLOOD PRESSURE: 124 MMHG | HEIGHT: 63 IN | TEMPERATURE: 99.3 F

## 2021-05-28 ENCOUNTER — HOSPITAL ENCOUNTER (OUTPATIENT)
Dept: LAB | Facility: HOSPITAL | Age: 51
Discharge: HOME | End: 2021-05-28
Attending: NURSE PRACTITIONER

## 2021-05-28 LAB
ALBUMIN SERPL-MCNC: 4.2 G/DL (ref 3.5–5)
ALBUMIN/GLOB SERPL: 1.8 {RATIO} (ref 1.4–2.6)
ALP SERPL-CCNC: 83 U/L (ref 42–98)
ALT SERPL-CCNC: 23 U/L (ref 10–40)
ANION GAP SERPL CALC-SCNC: 16 MMOL/L (ref 8–19)
AST SERPL-CCNC: 21 U/L (ref 15–50)
BILIRUB SERPL-MCNC: 0.64 MG/DL (ref 0.2–1.3)
BUN SERPL-MCNC: 8 MG/DL (ref 5–25)
BUN/CREAT SERPL: 11 {RATIO} (ref 6–20)
CALCIUM SERPL-MCNC: 9.2 MG/DL (ref 8.7–10.4)
CHLORIDE SERPL-SCNC: 106 MMOL/L (ref 99–111)
CHOLEST SERPL-MCNC: 109 MG/DL (ref 107–200)
CHOLEST/HDLC SERPL: 3.2 {RATIO} (ref 3–6)
CONV CO2: 24 MMOL/L (ref 22–32)
CONV CREATININE URINE, RANDOM: 253.7 MG/DL (ref 10–300)
CONV MICROALBUM.,U,RANDOM: <12 MG/L (ref 0–20)
CONV TOTAL PROTEIN: 6.6 G/DL (ref 6.3–8.2)
CREAT UR-MCNC: 0.72 MG/DL (ref 0.5–0.9)
EST. AVERAGE GLUCOSE BLD GHB EST-MCNC: 108 MG/DL
GFR SERPLBLD BASED ON 1.73 SQ M-ARVRAT: >60 ML/MIN/{1.73_M2}
GLOBULIN UR ELPH-MCNC: 2.4 G/DL (ref 2–3.5)
GLUCOSE SERPL-MCNC: 99 MG/DL (ref 65–99)
HBA1C MFR BLD: 5.4 % (ref 3.5–5.7)
HDLC SERPL-MCNC: 34 MG/DL (ref 40–60)
LDLC SERPL CALC-MCNC: 48 MG/DL (ref 70–100)
MICROALBUMIN/CREAT UR: 4.7 MG/G{CRE} (ref 0–35)
OSMOLALITY SERPL CALC.SUM OF ELEC: 292 MOSM/KG (ref 273–304)
POTASSIUM SERPL-SCNC: 4 MMOL/L (ref 3.5–5.3)
SODIUM SERPL-SCNC: 142 MMOL/L (ref 135–147)
TRIGL SERPL-MCNC: 135 MG/DL (ref 40–150)
VLDLC SERPL-MCNC: 27 MG/DL (ref 5–37)

## 2021-06-02 ENCOUNTER — HOSPITAL ENCOUNTER (OUTPATIENT)
Dept: GENERAL RADIOLOGY | Facility: HOSPITAL | Age: 51
Discharge: HOME | End: 2021-06-02
Attending: NURSE PRACTITIONER

## 2021-06-03 ENCOUNTER — OFFICE VISIT CONVERTED (OUTPATIENT)
Dept: NEUROLOGY | Facility: CLINIC | Age: 51
End: 2021-06-03
Attending: NURSE PRACTITIONER

## 2021-06-03 ENCOUNTER — OFFICE VISIT CONVERTED (OUTPATIENT)
Dept: FAMILY MEDICINE CLINIC | Facility: CLINIC | Age: 51
End: 2021-06-03
Attending: NURSE PRACTITIONER

## 2021-06-05 NOTE — PROGRESS NOTES
Progress Note      Patient Name: Laila Bell   Patient ID: [id number]   Sex: Female   YOB: 1970    Primary Care Provider: Mychal HEAD   Referring Provider: Mychal HEAD    Visit Date: Chantel 3, 2021    Provider: SONIDO Rangel   Location: [facility]   Location Address: [address] [place] [address], [address] [place] [address]  [id number]   Location Phone: [phone]          Chief Complaint     The patient is here for a f/u of hyperlipidemia, htn, migraines, dm.       History Of Present Illness  Laila Bell is a 50 year old /White female who presents for evaluation and treatment of:      Hyperlipidemia:  doing well on medication.    Diabetes:  well controlled by review of labs.  A1C 5.4%.    Headaches:  doing well on medication.           Past Medical History  Disease Name Date Onset Notes   Abnormal ECG 08/16/2016 --    Allergic rhinitis --  --    Bursitis, left hip 04/06/2018 --    Colon Polyps --  --    Diabetes mellitus, type 2 05/08/2019 --    Gastroesophageal Reflux Disorder --  --    Hyperlipidemia --  --    Hypertension --  --    IBS (irritable bowel syndrome) --  --    Migraine --  --    Pain of left hip joint 04/06/2018 --    Screening Mammogram 2/2019 2020 scheduled   Sinus trouble --  --    Tendinitis, left hip 04/06/2018 --          Past Surgical History  Procedure Name Date Notes   Colonoscopy 2015 2020 --    EGD 2020 --    Gallbladder --  --    Hysterectomy --  --    Tonsilectomy --  --          Medication List  Name Date Started Instructions   atorvastatin 10 mg oral tablet 03/04/2021 take 1 tablet (10 mg) by oral route once daily at bedtime for 90 days   Emgality Pen 120 mg/mL subcutaneous pen injector 02/02/2021 inject 1mL (120 mg) by subcutaneous route once a month in the abdomen, thigh, outer upper arm, or buttocks   FreeStyle Lite Meter miscellaneous kit 11/05/2018 use as directed glucometer that is on insurance to be tested once a  day. Strips and lancets   metoprolol succinate 25 mg oral tablet extended release 24 hr 03/04/2021 take 1 tablet by oral route 2 times a day for 90 days   Ozempic 1 mg/dose (2 mg/1.5 mL) subcutaneous pen injector 03/10/2021 INJECT 1 MG SUBCUTANEOUSLY EVERY WEEK ON THE SAME DAY IN THE ABDOMEN,THIGHS OR UPPER ARM (ROTATE INJECTION SITE)   SUMATRIPTAN SUCC 100 MG  Tablet 02/04/2021 TAKE 1 TABLET BY MOUTH ONCE THEN REPEAT IN 2 HOURS IF NEEDED (DO NOT EXXCEED 2 TABLETS PER 24 HOURS)         Allergy List  Allergen Name Date Reaction Notes   Trulicity Aug 16 2019 12:00AM itching --          Family Medical History  Disease Name Relative/Age Notes   Liver Neoplasm, Malignant  --    Heart Disease Father/   Father   Diabetes, unspecified type Father/   Father   - No Family History of Colorectal Cancer  --    Lung cancer  --    *No Known Family History  --    Family history of Arthritis Father/  Mother/   Mother; Father   Family history of heart disease Father/   Father         Social History  Finding Status Start/Stop Quantity Notes   *Denies Alcohol Use --  --/-- --  --    Alcohol Never --/-- --  does not drink   Alcohol Use Never --/-- --  does not drink   Claustophobic Unknown --/-- --  yes   lives with spouse --  --/-- --  --     --  --/-- --  --    . --  --/-- --  --    Recreational Drug Use Never --/-- --  no   Tobacco Never --/-- --  never smoker   Working --  --/-- --  --          Immunizations  NameDate Admin Mfg Trade Name Lot Number Route Inj VIS Given VIS Publication   COVID [id number]/09/2021 MOD Moderna COVID-19 Vaccine  NE NE 06/03/2021    Comments:    COVID [id number]/09/2021 MOD Moderna COVID-19 Vaccine  NE NE 06/03/2021    Comments:    InfluenzaRefused 11/16/2020 NE Not Entered  NE NE     Comments:          Review of Systems  · Constitutional  o Denies  o : fever, fatigue, weight loss, weight gain  · Cardiovascular  o Denies  o : lower extremity edema, claudication, chest pressure,  "palpitations  · Respiratory  o Denies  o : shortness of breath, wheezing, cough, hemoptysis, dyspnea on exertion  · Gastrointestinal  o Denies  o : nausea, vomiting, diarrhea, constipation, abdominal pain      Vitals  Date Time BP Position Site L\R Cuff Size HR RR TEMP (F) WT  HT  BMI kg/m2 BSA m2 O2 Sat FR L/min FiO2 HC       06/03/2021 10:59 /82 Sitting    79 - R 16 96.5 156lbs 16oz 5'  2\" 28.72 1.76 94 %  21%    06/03/2021 10:59 /82 Sitting    79 - R 16 96.5 156lbs 16oz 5'  2\" 28.72 1.76 94 %  21%          Physical Examination  · Constitutional  o Appearance  o : well-nourished, well developed, alert, in no acute distress  · Eyes  o Conjunctivae  o : conjunctivae normal  o Sclerae  o : sclerae white  o Pupils and Irises  o : pupils equal, round, and reactive to light and accommodation bilaterally  o Corneas  o : tear film normal, no lesions present  o Eyelids/Ocular Adnexae  o : eyelid appearance normal, no exudates present, eye moisture level normal  · Respiratory  o Respiratory Effort  o : breathing unlabored  o Inspection of Chest  o : normal appearance, no retractions  o Auscultation of Lungs  o : normal breath sounds throughout  · Cardiovascular  o Heart  o :   § Auscultation of Heart  § : regular rate and rhythm without murmur, PMI normal  o Peripheral Vascular System  o :   § Extremities  § : no cyanosis, clubbing or edema; less than 2 second refill noted  · Musculoskeletal  o General  o : No joint swelling or deformity noted. Muscle tone, strength and development grossly normal.  · Skin and Subcutaneous Tissue  o General Inspection  o : no rashes or lesions present, no areas of discoloration  · Neurologic  o Mental Status Examination  o : judgement, insight intact, modd and affect appropriate  o Motor Examination  o : strength grossly intact in all four extremities  o Gait and Station  o : normal gait, able to stand without difficulty          Assessment  · Screening for " depression     V79.0/Z13.89  · Diabetes mellitus, type 2     250.00/E11.9  · Headache     784.0/R51  Will give a letter for no mask if needs. Due to continue nasal drainage after surgery and headaches. She is also vaccinated  · Hyperlipidemia     272.4/E78.5      Plan  · Orders  o Annual depression screening, 15 minutes (, 25979) - V79.0/Z13.89 - 06/03/2021  o ACO-18: Negative screen for clinical depression using a standardized tool () - V79.0/Z13.89 - 06/03/2021  o Diabetes 2 Panel (Urine Microalbumin, CMP, Lipid, A1c, ) [facility] (02400, 18564, 31303, 23071) - 250.00/E11.9 - 12/03/2021  o Urinalysis with Reflex Microscopy ([facility]) (32092) - 250.00/E11.9 - 12/03/2021  o Diabetic Foot (Motor and Sensory) Exam Completed [facility] (, , 2028F) - 250.00/E11.9 - 06/03/2021  o ACO-14: Influenza immunization was not administered for reasons documented [facility] () - - 06/03/2021  o ACO-39: Current medications updated and reviewed (, 1159F) - - 06/03/2021  · Medications  o Medications have been Reconciled  o Transition of Care or Provider Policy  · Instructions  o Depression Screen completed and scanned into the EMR under the designated folder within the patient's documents.  o Today's PHQ-9 result is _0__  o The provider screening met the required time of 15 minutes.  o Advised that cheeses and other sources of dairy fats, animal fats, fast food, and the extras (candy, pastries, pies, doughnuts and cookies) all contain LDL raising nutrients. Advised to increase fruits, vegetables, whole grains, and to monitor portion sizes.   o Patient was educated/instructed on their diagnosis, treatment and medications prior to discharge from the clinic today.  o Minutes spent with patient including greater than 50% in Education/Counseling/Care Coordination.  o Time spent with the patient was minutes, more than 50% face to face.  · Disposition  o Return in 6 months            Electronically Signed by: SONIDO Rangel -Author  on Chantel 3, 2021 11:41:11 AM

## 2021-06-05 NOTE — PROGRESS NOTES
Progress Note      Patient Name: Laila Bell   Patient ID: [id number]   Sex: Female   YOB: 1970    Primary Care Provider: Mychal HEAD   Referring Provider: Mychal HEAD    Visit Date: Chantel 3, 2021    Provider: SONIDO Voss   Location: [facility]   Location Address: [address] [place] [address]  [id number]   Location Phone: [phone]          Chief Complaint     3 month f/u migraines       History Of Present Illness  Laila Bell is a 50 year old /White female who presents today to [facility] today referred from Mychal HEAD for evaluation of headaches.   She reports that she developed headaches many years ago. Since that time, her headaches have progressively worsened.   Currently, she reports headaches that are located frontal and retro-orbitally. She characterizes the headaches as 8/10 in severity, throbbing in nature with associated photophobia and phonophobia. Her headaches last 8-10 hours. She reports 15 headache days per month. She denies associated aura. She denies focal numbness, weakness, speech, and vision changes.   Triggers: Stress, Inadequate sleep, Weather, Lights, and odor   Symptoms improved by: Dark quiet room and Sleep   She states she is sleeping fairly well. Reports getting 6-7 hours of sleep per night. Denies snoring. Reports refreshing sleep.   Prior prophylactic medications include: Topamax, amitriptyline, Inderal, Depakote, aimovig, emgality   She uses abortive therapy such as: Imitrex, maxalt, ibuprofen, Excedrin, tylenol   Caffeine Use: minimal   Independently Reviewed: MRI brain and Prior PCP records   History of Kidney Stones: No   She denies a family history of cerebral aneurysm.   Laila Bell is a 50 year old /White female who presents today to [facility] today referred from Mychal HEAD for follow up. Doing well on Emgality. Having less than 4  headache days per month. This is much improved. Imitrex is effective for abortive therapy.       Past Medical History  Abnormal ECG; Allergic rhinitis; Bursitis, left hip; Colon Polyps; Diabetes mellitus, type 2; Gastroesophageal Reflux Disorder; Hyperlipidemia; Hypertension; IBS (irritable bowel syndrome); Migraine; Pain of left hip joint; Screening Mammogram; Sinus trouble; Tendinitis, left hip         Past Surgical History  Colonoscopy; EGD; Gallbladder; Hysterectomy; Tonsilectomy         Medication List  atorvastatin 10 mg oral tablet; Emgality Pen 120 mg/mL subcutaneous pen injector; FreeStyle Lite Meter miscellaneous kit; metoprolol succinate 25 mg oral tablet extended release 24 hr; Ozempic 1 mg/dose (2 mg/1.5 mL) subcutaneous pen injector; SUMATRIPTAN SUCC 100 MG  Tablet         Allergy List  Trulicity       Allergies Reconciled  Family Medical History  Liver Neoplasm, Malignant; Heart Disease; Diabetes, unspecified type; - No Family History of Colorectal Cancer; Lung cancer; *No Known Family History; Family history of Arthritis; Family history of heart disease         Social History  *Denies Alcohol Use; Alcohol (Never); Alcohol Use (Never); Claustophobic (Unknown); lives with spouse; ; .; Recreational Drug Use (Never); Tobacco (Never); Working         Immunizations  Name Date Admin   COVID Moderna 03/09/2021   COVID Moderna 02/09/2021   Influenza Refused   Influenza Refused         Review of Systems  · Constitutional  o Denies  o : chills, excessive sweating, fatigue, fever, sycope/passing out, weight gain, weight loss  · Eyes  o Denies  o : changes in vision, blurry vision, double vision  · HENT  o Admits  o : headaches  o Denies  o : loss of hearing, ringing in the ears, ear aches, sore throat, nasal congestion, sinus pain, nose bleeds, seasonal allergies  · Cardiovascular  o Denies  o : blood clots, swollen legs, anemia, easy burising or bleeding,  "transfusions  · Respiratory  o Denies  o : shortness of breath, dry cough, productive cough, pneumonia, COPD  · Gastrointestinal  o Denies  o : difficulty swallowing, reflux  · Genitourinary  o Denies  o : incontinence  · Neurologic  o Admits  o : headache  o Denies  o : seizure, stroke, tremor, loss of balance, falls, dizziness/vertigo, difficulty with sleep, numbness/tingling/paresthesia , difficulty with coordination, difficulty with dexterity, weakness  · Musculoskeletal  o Denies  o : neck stiffness/pain, swollen lymph nodes, muscle aches, joint pain, weakness, spasms, sciatica, pain radiating in arm, pain radiating in leg, low back pain  · Endocrine  o Denies  o : diabetes, thyroid disorder  · Psychiatric  o Denies  o : anxiety, depression      Vitals  Date Time BP Position Site L\R Cuff Size HR RR TEMP (F) WT  HT  BMI kg/m2 BSA m2 O2 Sat FR L/min FiO2 HC       06/03/2021 10:59 /82 Sitting    79 - R 16 96.5 156lbs 16oz 5'  2\" 28.72 1.76 94 %  21%          Physical Examination  · Constitutional  o Appearance  o : well-nourished, well groomed, in no apparent distress  · Eyes  o Pupils and Irises  o : Pupils equal, round, and reactive to light and accommodation bilaterally  · Respiratory  o Auscultation of Lungs  o : Lungs were clear to ascultation bilaterally. No wheezes, rhonchi or rales were appreciated.  · Cardiovascular  o Heart  o :   § Auscultation of Heart  § : Regular rate and rhythm, no murmurs, gallops or rubs were appreciated.  o Peripheral Vascular System  o :   § Extremities  § : No peripheral edema was appreciated  · Musculoskeletal  o General  o : Normal bulk and normal tone.  · Neurologic  o Mental Status Examination  o :   § Orientation  § : Alert and oriented to person, place, and time,  § Speech/Language  § : Intact naming, comprehension, and repetition. No dysarthria.  § Memory  § : Immediate recall is 3/3. Recall at 5 minutes is 3/3.   § Attention  § : Attention span is intact to " serial 7 examination and finger tapping.   § Fund of Knowledge  § : Adequate fund of knowledge  o Cranial Nerves  o : Pupils are equal, round and reactive to light. Extraocular movements are intact. Visual fields are full. Fundoscopic examination reveals sharp disc bilaterally. Sensation in the V1-V3 distribution is intact and symmetric. Muscles of mastication are strong and symmetric. Muscles of facial expression are strong and symmetric. Hearing is intact. Palatal raise is intact and symmetric. Uvula is midline. Shoulder shrug is strong. Tongue protrudes in the midline.  o Motor Examination  o :   § RUE Strength  § : strength normal  § LUE Strength  § : strength normal  § RLE Strength  § : strength normal  § LLE Strength  § : strength normal  o Reflexes  o : 2+ reflexes throughout and symmetric. Negative Barron. Negative Babinski.   o Sensation  o : Intact sensation to light touch, pinprick, vibration and proprioception throughout.  o Gait and Station  o :   § Gait Screening  § : Normal, narrow based gait, with a normal arm swing.  o Coordination  o : Intact finger to nose and heel to shin. Rapid alternating movements are intact in the upper and lower extremities.          Assessment  · Chronic migraine without aura or status migrainosus     346.70/G43.709  Will continue Emgality for preventative therapy of migraine and Imitrex for abortive therapy.      Plan  · Medications  o Imitrex 100 mg oral tablet   SIG: Take 1 tab at HA onset, may rpt in 2 hrs. No more than 2 tabs/day or 2 days/week   DISP: (9) Tablet with 3 refills  Prescribed on 06/03/2021     o Emgality Pen 120 mg/mL subcutaneous pen injector   SIG: inject 1mL (120 mg) by subcutaneous route once a month in the abdomen, thigh, outer upper arm, or buttocks   DISP: (1) Milliliter with 5 refills  Refilled on 06/03/2021     o Medications have been Reconciled  o Transition of Care or Provider Policy  · Instructions  o f/u 4 months  · Disposition  o Call or  Return if symptoms worsen or persist.            Electronically Signed by: SONIDO Voss -Author on June 4, 2021 09:24:05 AM

## 2021-07-15 VITALS
OXYGEN SATURATION: 94 % | SYSTOLIC BLOOD PRESSURE: 128 MMHG | HEART RATE: 79 BPM | BODY MASS INDEX: 28.89 KG/M2 | DIASTOLIC BLOOD PRESSURE: 82 MMHG | TEMPERATURE: 96.5 F | RESPIRATION RATE: 16 BRPM | WEIGHT: 157 LBS | HEIGHT: 62 IN

## 2021-09-27 ENCOUNTER — OFFICE VISIT (OUTPATIENT)
Dept: NEUROLOGY | Facility: CLINIC | Age: 51
End: 2021-09-27

## 2021-09-27 VITALS
HEART RATE: 72 BPM | SYSTOLIC BLOOD PRESSURE: 95 MMHG | WEIGHT: 155.3 LBS | HEIGHT: 62 IN | DIASTOLIC BLOOD PRESSURE: 50 MMHG | BODY MASS INDEX: 28.58 KG/M2

## 2021-09-27 DIAGNOSIS — G43.019 INTRACTABLE MIGRAINE WITHOUT AURA AND WITHOUT STATUS MIGRAINOSUS: Primary | ICD-10-CM

## 2021-09-27 PROCEDURE — 99213 OFFICE O/P EST LOW 20 MIN: CPT | Performed by: NURSE PRACTITIONER

## 2021-09-27 RX ORDER — METOPROLOL SUCCINATE 25 MG/1
25 TABLET, EXTENDED RELEASE ORAL 2 TIMES DAILY
COMMUNITY
End: 2021-11-16 | Stop reason: SDUPTHER

## 2021-09-27 RX ORDER — ATORVASTATIN CALCIUM 10 MG/1
10 TABLET, FILM COATED ORAL DAILY
COMMUNITY
End: 2022-05-17

## 2021-09-27 RX ORDER — SUMATRIPTAN 100 MG/1
100 TABLET, FILM COATED ORAL AS NEEDED
COMMUNITY
End: 2021-09-27 | Stop reason: SDUPTHER

## 2021-09-27 RX ORDER — SUMATRIPTAN 100 MG/1
100 TABLET, FILM COATED ORAL AS NEEDED
Qty: 12 TABLET | Refills: 3 | Status: SHIPPED | OUTPATIENT
Start: 2021-09-27 | End: 2022-03-17 | Stop reason: SDUPTHER

## 2021-09-27 RX ORDER — GALCANEZUMAB 120 MG/ML
120 INJECTION, SOLUTION SUBCUTANEOUS
COMMUNITY
End: 2021-09-27 | Stop reason: SDUPTHER

## 2021-09-27 RX ORDER — SEMAGLUTIDE 1.34 MG/ML
1 INJECTION, SOLUTION SUBCUTANEOUS WEEKLY
COMMUNITY
End: 2022-04-11

## 2021-09-27 RX ORDER — GALCANEZUMAB 120 MG/ML
120 INJECTION, SOLUTION SUBCUTANEOUS
Qty: 1 PEN | Refills: 3 | Status: SHIPPED | OUTPATIENT
Start: 2021-09-27 | End: 2022-03-17 | Stop reason: SDUPTHER

## 2021-09-27 NOTE — PROGRESS NOTES
"Chief Complaint  Migraine    Subjective          Laila Bell presents to [facility]  States she continues to do well on Emgality for preventative therapy of migraine.  Having less than 5 headache days per month.  Uses Imitrex with good result for abortive therapy.  Denies side effects.     Interval History:  She reports that she developed headaches many years ago. Since that time, her headaches have progressively worsened.   Currently, she reports headaches that are located frontal and retro-orbitally. She characterizes the headaches as 8/10 in severity, throbbing in nature with associated photophobia and phonophobia. Her headaches last 8-10 hours. She reports 16+ headache days per month. She denies associated aura. She denies focal numbness, weakness, speech, and vision changes.   Triggers: Stress, Inadequate sleep, Weather, Lights, and odor   Symptoms improved by: Dark quiet room and Sleep   She states she is sleeping fairly well. Reports getting 6-7 hours of sleep per night. Denies snoring. Reports refreshing sleep.   Prior prophylactic medications include: Topamax, amitriptyline, Inderal, Depakote, aimovig, emgality   She uses abortive therapy such as: Imitrex, maxalt, ibuprofen, Excedrin, tylenol   Caffeine Use: minimal   Independently Reviewed: MRI brain and Prior PCP records   History of Kidney Stones: No   She denies a family history of cerebral aneurysm      Objective   Vital Signs:   BP 95/50   Pulse 72   Ht 157.5 cm (62\")   Wt 70.4 kg (155 lb 4.8 oz)   BMI 28.40 kg/m²     Physical Exam  HENT:      Head: Normocephalic.   Pulmonary:      Effort: Pulmonary effort is normal.   Neurological:      Mental Status: She is alert and oriented to person, place, and time.      Cranial Nerves: Cranial nerves are intact.      Sensory: Sensation is intact.      Motor: Motor function is intact.      Coordination: Coordination is intact.      Deep Tendon Reflexes: " Reflexes are normal and symmetric.        Neurologic Exam     Mental Status   Oriented to person, place, and time.        Result Review :               Assessment and Plan    Diagnoses and all orders for this visit:    1. Intractable migraine without aura and without status migrainosus (Primary)  Assessment & Plan:  Continue Emgality for preventative therapy of migraine and Imitrex PRN for abortive therapy.           Other orders  -     galcanezumab-gnlm (Emgality) 120 MG/ML auto-injector pen; Inject 1 mL under the skin into the appropriate area as directed Every 30 (Thirty) Days.  Dispense: 1 pen; Refill: 3  -     SUMAtriptan (Imitrex) 100 MG tablet; Take 1 tablet by mouth As Needed for Migraine.  Dispense: 12 tablet; Refill: 3      Follow Up   Return in about 3 months (around 12/27/2021) for Migraine f/u.  Patient was given instructions and counseling regarding her condition or for health maintenance advice. Please see specific information pulled into the AVS if appropriate.

## 2021-09-30 PROBLEM — G43.909 MIGRAINE: Status: ACTIVE | Noted: 2021-09-30

## 2021-11-16 ENCOUNTER — TELEPHONE (OUTPATIENT)
Dept: FAMILY MEDICINE CLINIC | Facility: CLINIC | Age: 51
End: 2021-11-16

## 2021-11-16 RX ORDER — METOPROLOL SUCCINATE 25 MG/1
25 TABLET, EXTENDED RELEASE ORAL DAILY
Qty: 60 TABLET | Refills: 1 | Status: SHIPPED | OUTPATIENT
Start: 2021-11-16 | End: 2021-11-17

## 2021-11-16 NOTE — TELEPHONE ENCOUNTER
Caller: Laila Bell    Relationship: Self    Best call back number: [phone]    Which medication are you concerned about: metoprolol succinate XL (TOPROL-XL) 25 MG 24 hr tablet    Who prescribed you this medication: OPAL GOINS    What are your concerns: PATIENT TRIED TO GET A REFILL BUT PHARMACY TOLD HER IT WAS TOO EARLY, SHE SAW THAT THE INSTRUCTIONS WERE WRITTEN WRONG. SHE TAKES TWO A DAY, NOT ONE, AND HER PHARMACY WON'T REFILL UNTIL IT'S REWRITTEN. PLEASE SEND ANOTHER SCRIPT, PATIENT IS OUT OF MEDICATION

## 2021-11-17 ENCOUNTER — CLINICAL SUPPORT (OUTPATIENT)
Dept: FAMILY MEDICINE CLINIC | Facility: CLINIC | Age: 51
End: 2021-11-17

## 2021-11-17 DIAGNOSIS — I10 HYPERTENSION, UNSPECIFIED TYPE: ICD-10-CM

## 2021-11-17 DIAGNOSIS — J34.89 SINUS PAIN: Primary | ICD-10-CM

## 2021-11-17 LAB — SARS-COV-2 N GENE RESP QL NAA+PROBE: DETECTED

## 2021-11-17 PROCEDURE — 87635 SARS-COV-2 COVID-19 AMP PRB: CPT | Performed by: NURSE PRACTITIONER

## 2021-11-17 PROCEDURE — 99211 OFF/OP EST MAY X REQ PHY/QHP: CPT | Performed by: NURSE PRACTITIONER

## 2021-11-17 RX ORDER — METOPROLOL SUCCINATE 25 MG/1
50 TABLET, EXTENDED RELEASE ORAL DAILY
Qty: 180 TABLET | Refills: 1 | Status: SHIPPED | OUTPATIENT
Start: 2021-11-17 | End: 2022-06-24 | Stop reason: SDUPTHER

## 2021-11-18 ENCOUNTER — TRANSCRIBE ORDERS (OUTPATIENT)
Dept: ADMINISTRATIVE | Facility: HOSPITAL | Age: 51
End: 2021-11-18

## 2021-11-18 ENCOUNTER — TELEPHONE (OUTPATIENT)
Dept: FAMILY MEDICINE CLINIC | Facility: CLINIC | Age: 51
End: 2021-11-18

## 2021-11-18 DIAGNOSIS — U07.1 COVID: Primary | ICD-10-CM

## 2021-11-18 DIAGNOSIS — U07.1 COVID-19: ICD-10-CM

## 2021-11-18 DIAGNOSIS — J00 ACUTE NASOPHARYNGITIS: Primary | ICD-10-CM

## 2021-11-18 RX ORDER — AZITHROMYCIN 250 MG/1
TABLET, FILM COATED ORAL
Qty: 6 TABLET | Refills: 0 | Status: SHIPPED | OUTPATIENT
Start: 2021-11-18 | End: 2021-12-20

## 2021-11-18 RX ORDER — PREDNISONE 20 MG/1
40 TABLET ORAL DAILY
Qty: 8 TABLET | Refills: 0 | Status: SHIPPED | OUTPATIENT
Start: 2021-11-18 | End: 2021-12-20

## 2021-11-18 NOTE — TELEPHONE ENCOUNTER
Called pt to give results of covid swab, pt has yellow phlem. Per SONIDO Rangel sent in a z-pack and prednisone 40mg x4 days - pt to only take prednisone

## 2021-11-19 RX ORDER — DIPHENHYDRAMINE HYDROCHLORIDE 50 MG/ML
50 INJECTION INTRAMUSCULAR; INTRAVENOUS ONCE AS NEEDED
Status: DISCONTINUED | OUTPATIENT
Start: 2021-11-22 | End: 2021-11-24 | Stop reason: HOSPADM

## 2021-11-19 RX ORDER — DIPHENHYDRAMINE HCL 50 MG
50 CAPSULE ORAL ONCE AS NEEDED
Status: DISCONTINUED | OUTPATIENT
Start: 2021-11-22 | End: 2021-11-24 | Stop reason: HOSPADM

## 2021-11-19 RX ORDER — EPINEPHRINE 1 MG/ML
0.3 INJECTION, SOLUTION, CONCENTRATE INTRAVENOUS AS NEEDED
Status: DISCONTINUED | OUTPATIENT
Start: 2021-11-22 | End: 2021-11-24 | Stop reason: HOSPADM

## 2021-11-22 ENCOUNTER — HOSPITAL ENCOUNTER (OUTPATIENT)
Dept: INFUSION THERAPY | Facility: HOSPITAL | Age: 51
Discharge: HOME | End: 2021-11-22
Admitting: NURSE PRACTITIONER

## 2021-11-22 VITALS
OXYGEN SATURATION: 100 % | TEMPERATURE: 98 F | DIASTOLIC BLOOD PRESSURE: 74 MMHG | BODY MASS INDEX: 24.11 KG/M2 | HEART RATE: 78 BPM | SYSTOLIC BLOOD PRESSURE: 118 MMHG | WEIGHT: 150 LBS | HEIGHT: 66 IN | RESPIRATION RATE: 18 BRPM

## 2021-11-22 DIAGNOSIS — U07.1 COVID: ICD-10-CM

## 2021-11-22 PROCEDURE — 25010000002 INJECTION, CASIRIVIMAB AND IMDEVIMAB, 1200 MG: Performed by: NURSE PRACTITIONER

## 2021-11-22 PROCEDURE — 96372 THER/PROPH/DIAG INJ SC/IM: CPT

## 2021-11-22 PROCEDURE — M0243 CASIRIVI AND IMDEVI INFUSION: HCPCS

## 2021-11-22 PROCEDURE — M0243 CASIRIVI AND IMDEVI INFUSION: HCPCS | Performed by: NURSE PRACTITIONER

## 2021-11-22 RX ADMIN — CASIRIVIMAB AND IMDEVIMAB 300 MG: 600; 600 INJECTION, SOLUTION, CONCENTRATE INTRAVENOUS at 10:09

## 2021-11-22 NOTE — ADDENDUM NOTE
Encounter addended by: Michael Austin RN on: 11/22/2021 2:53 PM   Actions taken: Charge Capture section accepted

## 2021-12-10 ENCOUNTER — LAB (OUTPATIENT)
Dept: LAB | Facility: HOSPITAL | Age: 51
End: 2021-12-10

## 2021-12-10 ENCOUNTER — TRANSCRIBE ORDERS (OUTPATIENT)
Dept: LAB | Facility: HOSPITAL | Age: 51
End: 2021-12-10

## 2021-12-10 DIAGNOSIS — E11.9 DIABETES MELLITUS WITHOUT COMPLICATION (HCC): ICD-10-CM

## 2021-12-10 DIAGNOSIS — E11.9 DIABETES MELLITUS WITHOUT COMPLICATION (HCC): Primary | ICD-10-CM

## 2021-12-10 LAB
ALBUMIN SERPL-MCNC: 4.4 G/DL (ref 3.5–5.2)
ALBUMIN UR-MCNC: <1.2 MG/DL
ALBUMIN/GLOB SERPL: 1.8 G/DL
ALP SERPL-CCNC: 80 U/L (ref 39–117)
ALT SERPL W P-5'-P-CCNC: 27 U/L (ref 1–33)
ANION GAP SERPL CALCULATED.3IONS-SCNC: 7.6 MMOL/L (ref 5–15)
AST SERPL-CCNC: 19 U/L (ref 1–32)
BACTERIA UR QL AUTO: ABNORMAL /HPF
BILIRUB SERPL-MCNC: 1.1 MG/DL (ref 0–1.2)
BILIRUB UR QL STRIP: NEGATIVE
BUN SERPL-MCNC: 9 MG/DL (ref 6–20)
BUN/CREAT SERPL: 12.9 (ref 7–25)
CALCIUM SPEC-SCNC: 9.6 MG/DL (ref 8.6–10.5)
CHLORIDE SERPL-SCNC: 103 MMOL/L (ref 98–107)
CHOLEST SERPL-MCNC: 144 MG/DL (ref 0–200)
CLARITY UR: CLEAR
CO2 SERPL-SCNC: 29.4 MMOL/L (ref 22–29)
COLOR UR: YELLOW
CREAT SERPL-MCNC: 0.7 MG/DL (ref 0.57–1)
GFR SERPL CREATININE-BSD FRML MDRD: 89 ML/MIN/1.73
GLOBULIN UR ELPH-MCNC: 2.5 GM/DL
GLUCOSE SERPL-MCNC: 108 MG/DL (ref 65–99)
GLUCOSE UR STRIP-MCNC: NEGATIVE MG/DL
HBA1C MFR BLD: 5.76 % (ref 4.8–5.6)
HDLC SERPL-MCNC: 43 MG/DL (ref 40–60)
HGB UR QL STRIP.AUTO: NEGATIVE
HYALINE CASTS UR QL AUTO: ABNORMAL /LPF
KETONES UR QL STRIP: NEGATIVE
LDLC SERPL CALC-MCNC: 77 MG/DL (ref 0–100)
LDLC/HDLC SERPL: 1.71 {RATIO}
LEUKOCYTE ESTERASE UR QL STRIP.AUTO: ABNORMAL
NITRITE UR QL STRIP: NEGATIVE
PH UR STRIP.AUTO: 8 [PH] (ref 5–8)
POTASSIUM SERPL-SCNC: 4 MMOL/L (ref 3.5–5.2)
PROT SERPL-MCNC: 6.9 G/DL (ref 6–8.5)
PROT UR QL STRIP: NEGATIVE
RBC # UR STRIP: ABNORMAL /HPF
REF LAB TEST METHOD: ABNORMAL
SODIUM SERPL-SCNC: 140 MMOL/L (ref 136–145)
SP GR UR STRIP: 1.02 (ref 1–1.03)
SQUAMOUS #/AREA URNS HPF: ABNORMAL /HPF
TRIGL SERPL-MCNC: 137 MG/DL (ref 0–150)
UROBILINOGEN UR QL STRIP: ABNORMAL
VLDLC SERPL-MCNC: 24 MG/DL (ref 5–40)
WBC # UR STRIP: ABNORMAL /HPF

## 2021-12-10 PROCEDURE — 36415 COLL VENOUS BLD VENIPUNCTURE: CPT

## 2021-12-10 PROCEDURE — 81001 URINALYSIS AUTO W/SCOPE: CPT

## 2021-12-10 PROCEDURE — 83036 HEMOGLOBIN GLYCOSYLATED A1C: CPT

## 2021-12-10 PROCEDURE — 82043 UR ALBUMIN QUANTITATIVE: CPT

## 2021-12-10 PROCEDURE — 80061 LIPID PANEL: CPT

## 2021-12-10 PROCEDURE — 80053 COMPREHEN METABOLIC PANEL: CPT

## 2021-12-20 ENCOUNTER — OFFICE VISIT (OUTPATIENT)
Dept: FAMILY MEDICINE CLINIC | Facility: CLINIC | Age: 51
End: 2021-12-20

## 2021-12-20 VITALS
DIASTOLIC BLOOD PRESSURE: 80 MMHG | OXYGEN SATURATION: 100 % | HEIGHT: 66 IN | SYSTOLIC BLOOD PRESSURE: 112 MMHG | WEIGHT: 163 LBS | HEART RATE: 83 BPM | TEMPERATURE: 97.5 F | RESPIRATION RATE: 16 BRPM | BODY MASS INDEX: 26.2 KG/M2

## 2021-12-20 DIAGNOSIS — K21.9 GASTROESOPHAGEAL REFLUX DISEASE WITHOUT ESOPHAGITIS: ICD-10-CM

## 2021-12-20 DIAGNOSIS — E78.2 MIXED HYPERLIPIDEMIA: Primary | ICD-10-CM

## 2021-12-20 DIAGNOSIS — E11.65 TYPE 2 DIABETES MELLITUS WITH HYPERGLYCEMIA, WITHOUT LONG-TERM CURRENT USE OF INSULIN (HCC): ICD-10-CM

## 2021-12-20 DIAGNOSIS — I10 PRIMARY HYPERTENSION: ICD-10-CM

## 2021-12-20 PROBLEM — E11.9 DM2 (DIABETES MELLITUS, TYPE 2): Status: ACTIVE | Noted: 2019-02-28

## 2021-12-20 PROBLEM — R79.89 PROLACTIN INCREASED: Status: ACTIVE | Noted: 2019-02-28

## 2021-12-20 PROBLEM — M25.552 PAIN OF LEFT HIP JOINT: Status: ACTIVE | Noted: 2018-04-06

## 2021-12-20 PROBLEM — M77.9 TENDINITIS: Status: ACTIVE | Noted: 2018-04-06

## 2021-12-20 PROBLEM — R35.89 POLYURIA: Status: ACTIVE | Noted: 2019-02-28

## 2021-12-20 PROBLEM — D49.7 PITUITARY TUMOR: Status: ACTIVE | Noted: 2019-03-20

## 2021-12-20 PROBLEM — K63.5 COLON POLYPS: Status: ACTIVE | Noted: 2021-12-20

## 2021-12-20 PROBLEM — D35.2 PITUITARY ADENOMA: Status: ACTIVE | Noted: 2019-01-14

## 2021-12-20 PROBLEM — Z98.890 S/P SELECTIVE TRANSSPHENOIDAL PITUITARY ADENOMECTOMY: Status: ACTIVE | Noted: 2019-06-07

## 2021-12-20 PROBLEM — J30.9 ALLERGIC RHINITIS: Status: ACTIVE | Noted: 2021-12-20

## 2021-12-20 PROBLEM — Z86.018 S/P SELECTIVE TRANSSPHENOIDAL PITUITARY ADENOMECTOMY: Status: ACTIVE | Noted: 2019-06-07

## 2021-12-20 PROBLEM — IMO0001 DIABETES MELLITUS TYPE 2, UNCONTROLLED, WITHOUT COMPLICATIONS: Status: ACTIVE | Noted: 2019-02-28

## 2021-12-20 PROBLEM — E78.5 HYPERLIPIDEMIA: Status: ACTIVE | Noted: 2021-12-20

## 2021-12-20 PROCEDURE — 99214 OFFICE O/P EST MOD 30 MIN: CPT | Performed by: NURSE PRACTITIONER

## 2021-12-20 RX ORDER — AMOXICILLIN AND CLAVULANATE POTASSIUM 875; 125 MG/1; MG/1
1 TABLET, FILM COATED ORAL 2 TIMES DAILY
COMMUNITY
Start: 2021-12-15 | End: 2022-02-18

## 2021-12-20 NOTE — PROGRESS NOTES
Answers for HPI/ROS submitted by the patient on 12/18/2021  What is the primary reason for your visit?: Diabetes  Diabetes type: type 2  MedicAlert ID: No  Disease duration: 3 years  blurred vision: No  chest pain: No  fatigue: No  foot paresthesias: No  foot ulcerations: No  polydipsia: No  polyphagia: No  polyuria: Yes  visual change: No  weakness: No  weight loss: No  Symptom course: stable  confusion: No  dizziness: No  headaches: Yes  hunger: No  mood changes: No  nervous/anxious: No  pallor: No  seizures: No  sleepiness: No  speech difficulty: No  sweats: No  tremors: No  blackouts: No  hospitalization: No  nocturnal hypoglycemia: No  required assistance: No  required glucagon: No  CVA: No  heart disease: No  impotence: No  nephropathy: No  peripheral neuropathy: No  PVD: No  retinopathy: No  CAD risks: dyslipidemia, family history, hypertension  Current treatments: oral agent (monotherapy)  Treatment compliance: all of the time  Home blood tests: 3-4 x per week  Home urines: <1 x per month  Monitoring compliance: excellent  Blood glucose trend: no change  breakfast time: 9-10 am  breakfast glucose level: 110-130  High score: 110-130  Weight trend: stable  Current diet: generally healthy  Meal planning: carbohydrate counting  Exercise: daily  Dietitian visit: No  Eye exam current: Yes  Sees podiatrist: No    Chief Complaint  Headache (f/u), Hypertension (f/u), Hyperlipidemia (f/u), Sinusitis (on augmentin, negative for the flu), and Fatigue    Subjective        Laila Bell presents to [facility]  November 17, 2021 Booster of covid vaccine 5 days earlier.  Notes had upper respiratory.  Symptoms and was positive for covid.  Received Monoclonal antibodies.  Has sinus pressure and was given Augmentin and the color is better but still having pressure and facial pain.        Headache   This is a chronic problem. Pertinent negatives include no blurred vision, dizziness,  seizures, visual change, weakness or weight loss.   Hypertension  Associated symptoms include headaches. Pertinent negatives include no blurred vision, chest pain or sweats. There is no history of CVA, PVD or retinopathy.   Hyperlipidemia  Pertinent negatives include no chest pain.   Sinusitis  Associated symptoms include headaches.   Fatigue  Associated symptoms include fatigue and headaches. Pertinent negatives include no chest pain, visual change or weakness.   Diabetes  She has type 2 diabetes mellitus. No MedicAlert identification noted. The initial diagnosis of diabetes was made 3 years ago. Hypoglycemia symptoms include headaches. Pertinent negatives for hypoglycemia include no confusion, dizziness, hunger, mood changes, nervousness/anxiousness, pallor, seizures, sleepiness, speech difficulty, sweats or tremors. Associated symptoms include fatigue and polyuria. Pertinent negatives for diabetes include no blurred vision, no chest pain, no foot paresthesias, no foot ulcerations, no polydipsia, no polyphagia, no visual change, no weakness and no weight loss. Pertinent negatives for hypoglycemia complications include no blackouts, no hospitalization, no nocturnal hypoglycemia, no required assistance and no required glucagon injection. Symptoms are stable. Pertinent negatives for diabetic complications include no CVA, heart disease, impotence, nephropathy, peripheral neuropathy, PVD or retinopathy. Risk factors for coronary artery disease include dyslipidemia, family history and hypertension. Current diabetic treatment includes oral agent (monotherapy). She is compliant with treatment all of the time. Her weight is stable. She is following a generally healthy diet. Meal planning includes carbohydrate counting. She has not had a previous visit with a dietitian. She participates in exercise daily. She monitors blood glucose at home 3-4 x per week. She monitors urine at home <1 x per month. Blood glucose monitoring  compliance is excellent. There is no change in her home blood glucose trend. Her breakfast blood glucose is taken between 9-10 am. Her breakfast blood glucose range is generally 110-130 mg/dl. Her highest blood glucose is 110-130 mg/dl. She does not see a podiatrist.Eye exam is current.         Past History:    Medical History: has a past medical history of Abnormal ECG (08/16/2016), Abnormal Pap smear of cervix (2011), Acid reflux, Allergic rhinitis, Bursitis of left hip (04/06/2018), Colon polyps, Constipation, Diabetes mellitus, type 2 ([facility]) (05/08/2019), Diarrhea, DM2 (diabetes mellitus, type 2) ([facility]) (05/08/2019), Fatigue, Frequent headaches, Gastroesophageal reflux, GERD (gastroesophageal reflux disease), Gestational diabetes, History of screening mammography (02/2019), HLD (hyperlipidemia), HPV in female (2013), Hyperlipidemia, Hypertension, Hypotension, IBS (irritable bowel syndrome), LGSIL on Pap smear of cervix (2012), LGSIL on Pap smear of cervix (2011), Menorrhagia, Migraine, Mild dysplasia of cervix (DOMINICK I), Mild dysplasia of cervix (DOMINICK I) (2011), Numbness and tingling, Pain of left hip joint (04/06/2018), Screening mammogram, encounter for (02/2019), Sinus trouble, Tendinitis of left hip (04/06/2018), Urinary frequency, and Urinary urgency.     Surgical History: has a past surgical history that includes Colonoscopy (2020); Esophagogastroduodenoscopy (2020); Gallbladder surgery; Abdominal hysterectomy; Tonsillectomy; Hysterectomy (2004); Tonsillectomy (1990); Cholecystectomy; Mammo stereotactic breast biopsy 1st w wo device (Right, 2007); Colposcopy (03/23/2011); Laparoscopic tubal ligation w/ Falope ring (2003); d & c hysteroscopy endometrial ablation; and Colonoscopy (2015, 2020).     Family History: family history includes Arthritis in her father and mother; Diabetes in her father; Heart disease in her father and paternal grandfather; Hypertension in her father; Kidney disease in her paternal  grandfather; Liver cancer in an other family member; Liver disease in her father and mother; Lung cancer in an other family member; Uterine cancer in her maternal grandmother.     Social History: reports that she has never smoked. She has never used smokeless tobacco. She reports that she does not drink alcohol and does not use drugs.    Allergies: Dulaglutide          Current Outpatient Medications:   •  amoxicillin-clavulanate (AUGMENTIN) 875-125 MG per tablet, Take 1 tablet by mouth 2 (Two) Times a Day., Disp: , Rfl:   •  atorvastatin (LIPITOR) 10 MG tablet, Take 10 mg by mouth Daily., Disp: , Rfl:   •  galcanezumab-gnlm (Emgality) 120 MG/ML auto-injector pen, Inject 1 mL under the skin into the appropriate area as directed Every 30 (Thirty) Days., Disp: 1 pen, Rfl: 3  •  metoprolol succinate XL (TOPROL-XL) 25 MG 24 hr tablet, Take 2 tablets by mouth Daily., Disp: 180 tablet, Rfl: 1  •  Semaglutide, 1 MG/DOSE, (Ozempic, 1 MG/DOSE,) 2 MG/1.5ML solution pen-injector, Inject 1 mg under the skin into the appropriate area as directed 1 (One) Time Per Week., Disp: , Rfl:   •  SUMAtriptan (Imitrex) 100 MG tablet, Take 1 tablet by mouth As Needed for Migraine., Disp: 12 tablet, Rfl: 3    Medications Discontinued During This Encounter   Medication Reason   • predniSONE (DELTASONE) 20 MG tablet *Therapy completed   • azithromycin (Zithromax Z-Glenn) 250 MG tablet *Therapy completed         Review of Systems   Constitutional: Positive for fatigue. Negative for unexpected weight loss.   Eyes: Negative for blurred vision.   Cardiovascular: Negative for chest pain.   Endocrine: Positive for polyuria. Negative for polydipsia and polyphagia.   Genitourinary: Negative for impotence.   Skin: Negative for pallor.   Neurological: Negative for dizziness, tremors, seizures, speech difficulty, weakness and confusion.   Psychiatric/Behavioral: The patient is not nervous/anxious.         Objective         Vitals:    12/20/21 1149   BP:  "112/80   BP Location: Right arm   Patient Position: Sitting   Cuff Size: Adult   Pulse: 83   Resp: 16   Temp: 97.5 °F (36.4 °C)   TempSrc: Infrared   SpO2: 100%   Weight: 73.9 kg (163 lb)   Height: 167.6 cm (66\")     Body mass index is 26.31 kg/m².         Physical Exam  Vitals reviewed.   Constitutional:       Appearance: Normal appearance. She is well-developed.   HENT:      Head: Normocephalic and atraumatic.      Mouth/Throat:      Pharynx: No oropharyngeal exudate.   Eyes:      Conjunctiva/sclera: Conjunctivae normal.      Pupils: Pupils are equal, round, and reactive to light.   Cardiovascular:      Rate and Rhythm: Normal rate and regular rhythm.      Heart sounds: No murmur heard.  No friction rub. No gallop.    Pulmonary:      Effort: Pulmonary effort is normal.      Breath sounds: Normal breath sounds. No wheezing or rhonchi.   Skin:     General: Skin is warm and dry.   Neurological:      Mental Status: She is alert and oriented to person, place, and time.   Psychiatric:         Mood and Affect: Mood and affect normal.         Behavior: Behavior normal.         Thought Content: Thought content normal.         Judgment: Judgment normal.             Result Review :               Assessment and Plan     Diagnoses and all orders for this visit:    1. Mixed hyperlipidemia (Primary)  Comments:  continue atorvastatin at current dose.    2. Primary hypertension  Comments:  continue metoprolol at current dose.    3. Gastroesophageal reflux disease without esophagitis    4. Type 2 diabetes mellitus with hyperglycemia, without long-term current use of insulin (HCC)  Comments:  continue ozempic at current dose.  Orders:  -     CBC & Differential; Future  -     Comprehensive Metabolic Panel; Future  -     Hemoglobin A1c; Future  -     Urinalysis With Culture If Indicated -; Future  -     Lipid Panel; Future  -     MicroAlbumin, Urine, Random - Urine, Clean Catch; Future              Follow Up     Return in about 6 " months (around 6/20/2022).    Patient was given instructions and counseling regarding her condition or for health maintenance advice. Please see specific information pulled into the AVS if appropriate.

## 2022-01-20 ENCOUNTER — TELEPHONE (OUTPATIENT)
Dept: FAMILY MEDICINE CLINIC | Facility: CLINIC | Age: 52
End: 2022-01-20

## 2022-01-20 DIAGNOSIS — Z12.31 ENCOUNTER FOR SCREENING MAMMOGRAM FOR MALIGNANT NEOPLASM OF BREAST: Primary | ICD-10-CM

## 2022-01-20 NOTE — TELEPHONE ENCOUNTER
----- Message from Laila Bell sent at 1/20/2022  3:18 PM EST -----  Regarding: Mammogram   Would you please make me an appointment for my yearly mammogram for after June 2? Thank you!

## 2022-02-17 ENCOUNTER — TELEPHONE (OUTPATIENT)
Dept: FAMILY MEDICINE CLINIC | Facility: CLINIC | Age: 52
End: 2022-02-17

## 2022-02-17 NOTE — TELEPHONE ENCOUNTER
Caller: Laila Bell    Relationship to patient: Self    Best call back number: [phone]    Patient is needing: PATIENT CALLED SATING SHE HAS A YELLOW TINTED SORE ON HER LEFT LEG ON THE SHIN THAT IS NOT GOING AWAY. THE PATIENT WOULD LIKE A CALL BACK TO SPEAK WITH CLINICAL STAFF ABOUT THIS. PLEASE ADVISE THANK YOU.

## 2022-03-14 RX ORDER — SEMAGLUTIDE 1.34 MG/ML
INJECTION, SOLUTION SUBCUTANEOUS
Qty: 3 ML | Refills: 0 | Status: SHIPPED | OUTPATIENT
Start: 2022-03-14 | End: 2022-04-11

## 2022-03-17 ENCOUNTER — OFFICE VISIT (OUTPATIENT)
Dept: NEUROLOGY | Facility: CLINIC | Age: 52
End: 2022-03-17

## 2022-03-17 VITALS
SYSTOLIC BLOOD PRESSURE: 129 MMHG | HEIGHT: 66 IN | DIASTOLIC BLOOD PRESSURE: 84 MMHG | BODY MASS INDEX: 25.58 KG/M2 | HEART RATE: 82 BPM | WEIGHT: 159.2 LBS

## 2022-03-17 DIAGNOSIS — G43.019 INTRACTABLE MIGRAINE WITHOUT AURA AND WITHOUT STATUS MIGRAINOSUS: Primary | ICD-10-CM

## 2022-03-17 DIAGNOSIS — U09.9 POST-COVID-19 SYNDROME MANIFESTING AS CHRONIC HEADACHE: ICD-10-CM

## 2022-03-17 DIAGNOSIS — R51.9 POST-COVID-19 SYNDROME MANIFESTING AS CHRONIC HEADACHE: ICD-10-CM

## 2022-03-17 DIAGNOSIS — G89.29 POST-COVID-19 SYNDROME MANIFESTING AS CHRONIC HEADACHE: ICD-10-CM

## 2022-03-17 PROCEDURE — 99214 OFFICE O/P EST MOD 30 MIN: CPT | Performed by: NURSE PRACTITIONER

## 2022-03-17 RX ORDER — SUMATRIPTAN 100 MG/1
100 TABLET, FILM COATED ORAL AS NEEDED
Qty: 12 TABLET | Refills: 3 | Status: SHIPPED | OUTPATIENT
Start: 2022-03-17 | End: 2022-06-20 | Stop reason: SDUPTHER

## 2022-03-17 RX ORDER — GALCANEZUMAB 120 MG/ML
120 INJECTION, SOLUTION SUBCUTANEOUS
Qty: 1 PEN | Refills: 3 | Status: SHIPPED | OUTPATIENT
Start: 2022-03-17 | End: 2022-06-20 | Stop reason: SDUPTHER

## 2022-03-17 NOTE — PROGRESS NOTES
"Chief Complaint  Migraine (Emgality works sometimes)    Subjective          Laila Bell presents to [facility]  States she continues to do well on Emgality for preventative therapy of migraine.  Having less than 6 headache days per month.  Uses Imitrex with good result for abortive therapy.  Denies side effects.  States that she had Covid in November and has had an increase in her headaches.    Interval History:  She reports that she developed headaches many years ago. Since that time, her headaches have progressively worsened.   Currently, she reports headaches that are located frontal and retro-orbitally. She characterizes the headaches as 8/10 in severity, throbbing in nature with associated photophobia and phonophobia. Her headaches last 8-10 hours. She reports 16+ headache days per month. She denies associated aura. She denies focal numbness, weakness, speech, and vision changes.   Triggers: Stress, Inadequate sleep, Weather, Lights, and odor   Symptoms improved by: Dark quiet room and Sleep   She states she is sleeping fairly well. Reports getting 6-7 hours of sleep per night. Denies snoring. Reports refreshing sleep.   Prior prophylactic medications include: Topamax, amitriptyline, Inderal, Depakote, aimovig, emgality   She uses abortive therapy such as: Imitrex, maxalt, ibuprofen, Excedrin, tylenol   Caffeine Use: minimal   Independently Reviewed: MRI brain and Prior PCP records   History of Kidney Stones: No   She denies a family history of cerebral aneurysm      Objective   Vital Signs:   /84   Pulse 82   Ht 167.6 cm (66\")   Wt 72.2 kg (159 lb 3.2 oz)   BMI 25.70 kg/m²     Physical Exam  HENT:      Head: Normocephalic.   Pulmonary:      Effort: Pulmonary effort is normal.   Neurological:      Mental Status: She is alert and oriented to person, place, and time.      Cranial Nerves: Cranial nerves are intact.      Sensory: Sensation is intact.      " Motor: Motor function is intact.      Coordination: Coordination is intact.      Deep Tendon Reflexes: Reflexes are normal and symmetric.        Neurologic Exam     Mental Status   Oriented to person, place, and time.        Result Review :               Assessment and Plan    Diagnoses and all orders for this visit:    1. Intractable migraine without aura and without status migrainosus (Primary)  Assessment & Plan:  Continue Emgality for preventative therapy of migraine and Imitrex as needed for abortive therapy.  We discussed that she had an increase in headaches due to post Covid syndrome and we cannot fully  the Emgality's effectiveness at this time.  We will reevaluate in 3 months.        2. Post-COVID-19 syndrome manifesting as chronic headache    Other orders  -     galcanezumab-gnlm (Emgality) 120 MG/ML auto-injector pen; Inject 1 mL under the skin into the appropriate area as directed Every 30 (Thirty) Days.  Dispense: 1 pen; Refill: 3  -     SUMAtriptan (Imitrex) 100 MG tablet; Take 1 tablet by mouth As Needed for Migraine.  Dispense: 12 tablet; Refill: 3      Follow Up   Return in about 3 months (around 6/17/2022) for Migraine f/u.  Patient was given instructions and counseling regarding her condition or for health maintenance advice. Please see specific information pulled into the AVS if appropriate.

## 2022-03-21 PROBLEM — R51.9 POST-COVID-19 SYNDROME MANIFESTING AS CHRONIC HEADACHE: Status: ACTIVE | Noted: 2022-03-21

## 2022-03-21 PROBLEM — U09.9 POST-COVID-19 SYNDROME MANIFESTING AS CHRONIC HEADACHE: Status: ACTIVE | Noted: 2022-03-21

## 2022-03-21 PROBLEM — G89.29 POST-COVID-19 SYNDROME MANIFESTING AS CHRONIC HEADACHE: Status: ACTIVE | Noted: 2022-03-21

## 2022-03-21 NOTE — ASSESSMENT & PLAN NOTE
Continue Emgality for preventative therapy of migraine and Imitrex as needed for abortive therapy.  We discussed that she had an increase in headaches due to post Covid syndrome and we cannot fully  the Emgality's effectiveness at this time.  We will reevaluate in 3 months.

## 2022-03-28 ENCOUNTER — HOSPITAL ENCOUNTER (EMERGENCY)
Facility: HOSPITAL | Age: 52
Discharge: HOME | End: 2022-03-28
Attending: EMERGENCY MEDICINE | Admitting: EMERGENCY MEDICINE

## 2022-03-28 ENCOUNTER — APPOINTMENT (OUTPATIENT)
Dept: CT IMAGING | Facility: HOSPITAL | Age: 52
End: 2022-03-28

## 2022-03-28 VITALS
SYSTOLIC BLOOD PRESSURE: 133 MMHG | OXYGEN SATURATION: 99 % | TEMPERATURE: 97.9 F | HEIGHT: 62 IN | WEIGHT: 160.72 LBS | RESPIRATION RATE: 16 BRPM | DIASTOLIC BLOOD PRESSURE: 89 MMHG | HEART RATE: 79 BPM | BODY MASS INDEX: 29.58 KG/M2

## 2022-03-28 DIAGNOSIS — R10.31 RIGHT LOWER QUADRANT ABDOMINAL PAIN: Primary | ICD-10-CM

## 2022-03-28 LAB
ALBUMIN SERPL-MCNC: 4.6 G/DL (ref 3.5–5.2)
ALBUMIN/GLOB SERPL: 1.7 G/DL
ALP SERPL-CCNC: 103 U/L (ref 39–117)
ALT SERPL W P-5'-P-CCNC: 26 U/L (ref 1–33)
ANION GAP SERPL CALCULATED.3IONS-SCNC: 10.9 MMOL/L (ref 5–15)
AST SERPL-CCNC: 21 U/L (ref 1–32)
BASOPHILS # BLD AUTO: 0.03 10*3/MM3 (ref 0–0.2)
BASOPHILS NFR BLD AUTO: 0.4 % (ref 0–1.5)
BILIRUB SERPL-MCNC: 1.1 MG/DL (ref 0–1.2)
BILIRUB UR QL STRIP: NEGATIVE
BUN SERPL-MCNC: 10 MG/DL (ref 6–20)
BUN/CREAT SERPL: 12.8 (ref 7–25)
CALCIUM SPEC-SCNC: 10.1 MG/DL (ref 8.6–10.5)
CHLORIDE SERPL-SCNC: 102 MMOL/L (ref 98–107)
CLARITY UR: CLEAR
CO2 SERPL-SCNC: 27.1 MMOL/L (ref 22–29)
COLOR UR: YELLOW
CREAT SERPL-MCNC: 0.78 MG/DL (ref 0.57–1)
DEPRECATED RDW RBC AUTO: 38.6 FL (ref 37–54)
EGFRCR SERPLBLD CKD-EPI 2021: 92.7 ML/MIN/1.73
EOSINOPHIL # BLD AUTO: 0.65 10*3/MM3 (ref 0–0.4)
EOSINOPHIL NFR BLD AUTO: 8.7 % (ref 0.3–6.2)
ERYTHROCYTE [DISTWIDTH] IN BLOOD BY AUTOMATED COUNT: 12.1 % (ref 12.3–15.4)
GLOBULIN UR ELPH-MCNC: 2.7 GM/DL
GLUCOSE SERPL-MCNC: 197 MG/DL (ref 65–99)
GLUCOSE UR STRIP-MCNC: NEGATIVE MG/DL
HCT VFR BLD AUTO: 41.2 % (ref 34–46.6)
HGB BLD-MCNC: 14.8 G/DL (ref 12–15.9)
HGB UR QL STRIP.AUTO: NEGATIVE
HOLD SPECIMEN: NORMAL
HOLD SPECIMEN: NORMAL
IMM GRANULOCYTES # BLD AUTO: 0.03 10*3/MM3 (ref 0–0.05)
IMM GRANULOCYTES NFR BLD AUTO: 0.4 % (ref 0–0.5)
KETONES UR QL STRIP: NEGATIVE
LEUKOCYTE ESTERASE UR QL STRIP.AUTO: NEGATIVE
LIPASE SERPL-CCNC: 30 U/L (ref 13–60)
LYMPHOCYTES # BLD AUTO: 1.97 10*3/MM3 (ref 0.7–3.1)
LYMPHOCYTES NFR BLD AUTO: 26.4 % (ref 19.6–45.3)
MCH RBC QN AUTO: 31.6 PG (ref 26.6–33)
MCHC RBC AUTO-ENTMCNC: 35.9 G/DL (ref 31.5–35.7)
MCV RBC AUTO: 88 FL (ref 79–97)
MONOCYTES # BLD AUTO: 0.37 10*3/MM3 (ref 0.1–0.9)
MONOCYTES NFR BLD AUTO: 5 % (ref 5–12)
NEUTROPHILS NFR BLD AUTO: 4.4 10*3/MM3 (ref 1.7–7)
NEUTROPHILS NFR BLD AUTO: 59.1 % (ref 42.7–76)
NITRITE UR QL STRIP: NEGATIVE
NRBC BLD AUTO-RTO: 0 /100 WBC (ref 0–0.2)
PH UR STRIP.AUTO: 6.5 [PH] (ref 5–8)
PLATELET # BLD AUTO: 230 10*3/MM3 (ref 140–450)
PMV BLD AUTO: 9.2 FL (ref 6–12)
POTASSIUM SERPL-SCNC: 4 MMOL/L (ref 3.5–5.2)
PROT SERPL-MCNC: 7.3 G/DL (ref 6–8.5)
PROT UR QL STRIP: NEGATIVE
RBC # BLD AUTO: 4.68 10*6/MM3 (ref 3.77–5.28)
SODIUM SERPL-SCNC: 140 MMOL/L (ref 136–145)
SP GR UR STRIP: <=1.005 (ref 1–1.03)
UROBILINOGEN UR QL STRIP: NORMAL
WBC NRBC COR # BLD: 7.45 10*3/MM3 (ref 3.4–10.8)
WHOLE BLOOD HOLD SPECIMEN: NORMAL
WHOLE BLOOD HOLD SPECIMEN: NORMAL

## 2022-03-28 PROCEDURE — 25010000002 ONDANSETRON PER 1 MG: Performed by: EMERGENCY MEDICINE

## 2022-03-28 PROCEDURE — 96374 THER/PROPH/DIAG INJ IV PUSH: CPT

## 2022-03-28 PROCEDURE — 83690 ASSAY OF LIPASE: CPT | Performed by: EMERGENCY MEDICINE

## 2022-03-28 PROCEDURE — 85025 COMPLETE CBC W/AUTO DIFF WBC: CPT | Performed by: EMERGENCY MEDICINE

## 2022-03-28 PROCEDURE — 74177 CT ABD & PELVIS W/CONTRAST: CPT

## 2022-03-28 PROCEDURE — 99283 EMERGENCY DEPT VISIT LOW MDM: CPT

## 2022-03-28 PROCEDURE — 25010000002 MORPHINE PER 10 MG: Performed by: EMERGENCY MEDICINE

## 2022-03-28 PROCEDURE — 96375 TX/PRO/DX INJ NEW DRUG ADDON: CPT

## 2022-03-28 PROCEDURE — 36415 COLL VENOUS BLD VENIPUNCTURE: CPT

## 2022-03-28 PROCEDURE — 80053 COMPREHEN METABOLIC PANEL: CPT | Performed by: EMERGENCY MEDICINE

## 2022-03-28 PROCEDURE — 81003 URINALYSIS AUTO W/O SCOPE: CPT | Performed by: EMERGENCY MEDICINE

## 2022-03-28 PROCEDURE — 0 IOPAMIDOL PER 1 ML: Performed by: EMERGENCY MEDICINE

## 2022-03-28 RX ORDER — DICYCLOMINE HYDROCHLORIDE 10 MG/1
20 CAPSULE ORAL ONCE
Status: COMPLETED | OUTPATIENT
Start: 2022-03-28 | End: 2022-03-28

## 2022-03-28 RX ORDER — MORPHINE SULFATE 2 MG/ML
2 INJECTION, SOLUTION INTRAMUSCULAR; INTRAVENOUS ONCE
Status: COMPLETED | OUTPATIENT
Start: 2022-03-28 | End: 2022-03-28

## 2022-03-28 RX ORDER — DICYCLOMINE HCL 20 MG
20 TABLET ORAL EVERY 6 HOURS
Qty: 20 TABLET | Refills: 0 | Status: SHIPPED | OUTPATIENT
Start: 2022-03-28 | End: 2022-05-31

## 2022-03-28 RX ORDER — ONDANSETRON 2 MG/ML
4 INJECTION INTRAMUSCULAR; INTRAVENOUS ONCE
Status: COMPLETED | OUTPATIENT
Start: 2022-03-28 | End: 2022-03-28

## 2022-03-28 RX ORDER — ONDANSETRON 4 MG/1
4 TABLET, ORALLY DISINTEGRATING ORAL EVERY 8 HOURS PRN
Qty: 10 TABLET | Refills: 0 | Status: SHIPPED | OUTPATIENT
Start: 2022-03-28 | End: 2022-06-20

## 2022-03-28 RX ORDER — SODIUM CHLORIDE 0.9 % (FLUSH) 0.9 %
10 SYRINGE (ML) INJECTION AS NEEDED
Status: DISCONTINUED | OUTPATIENT
Start: 2022-03-28 | End: 2022-03-28 | Stop reason: HOSPADM

## 2022-03-28 RX ADMIN — SODIUM CHLORIDE 1000 ML: 9 INJECTION, SOLUTION INTRAVENOUS at 08:01

## 2022-03-28 RX ADMIN — MORPHINE SULFATE 2 MG: 2 INJECTION, SOLUTION INTRAMUSCULAR; INTRAVENOUS at 08:00

## 2022-03-28 RX ADMIN — DICYCLOMINE HYDROCHLORIDE 20 MG: 10 CAPSULE ORAL at 10:18

## 2022-03-28 RX ADMIN — ONDANSETRON 4 MG: 2 INJECTION INTRAMUSCULAR; INTRAVENOUS at 07:59

## 2022-03-28 RX ADMIN — IOPAMIDOL 100 ML: 755 INJECTION, SOLUTION INTRAVENOUS at 08:41

## 2022-03-30 ENCOUNTER — PATIENT MESSAGE (OUTPATIENT)
Dept: FAMILY MEDICINE CLINIC | Facility: CLINIC | Age: 52
End: 2022-03-30

## 2022-03-30 DIAGNOSIS — D72.829 LEUKOCYTOSIS, UNSPECIFIED TYPE: Primary | ICD-10-CM

## 2022-03-30 NOTE — TELEPHONE ENCOUNTER
From: Laila Bell  To: SONIDO Rangel  Sent: 3/30/2022 1:16 PM EDT  Subject: Follow-UP    I was sick with vomiting and stomach/back pain Thursday-Monday and went to the ER Monday. They didn't find anything wrong, but said to follow-up with you about high blood pressure and high white blood cells (Eosinophils). I'm not concerned about the blood pressure because I was sick and in pain, so I expected it to be high. I don't know much about the Eosinophils. Should I do anything about that?  Thank you for your time.

## 2022-04-04 ENCOUNTER — LAB (OUTPATIENT)
Dept: LAB | Facility: HOSPITAL | Age: 52
End: 2022-04-04

## 2022-04-04 DIAGNOSIS — D72.829 LEUKOCYTOSIS, UNSPECIFIED TYPE: ICD-10-CM

## 2022-04-04 LAB
BASOPHILS # BLD AUTO: 0.05 10*3/MM3 (ref 0–0.2)
BASOPHILS NFR BLD AUTO: 0.7 % (ref 0–1.5)
DEPRECATED RDW RBC AUTO: 37.6 FL (ref 37–54)
EOSINOPHIL # BLD AUTO: 0.55 10*3/MM3 (ref 0–0.4)
EOSINOPHIL NFR BLD AUTO: 8.2 % (ref 0.3–6.2)
ERYTHROCYTE [DISTWIDTH] IN BLOOD BY AUTOMATED COUNT: 11.7 % (ref 12.3–15.4)
HCT VFR BLD AUTO: 39.7 % (ref 34–46.6)
HGB BLD-MCNC: 14.1 G/DL (ref 12–15.9)
IMM GRANULOCYTES # BLD AUTO: 0.02 10*3/MM3 (ref 0–0.05)
IMM GRANULOCYTES NFR BLD AUTO: 0.3 % (ref 0–0.5)
LYMPHOCYTES # BLD AUTO: 1.96 10*3/MM3 (ref 0.7–3.1)
LYMPHOCYTES NFR BLD AUTO: 29.3 % (ref 19.6–45.3)
MCH RBC QN AUTO: 31.8 PG (ref 26.6–33)
MCHC RBC AUTO-ENTMCNC: 35.5 G/DL (ref 31.5–35.7)
MCV RBC AUTO: 89.4 FL (ref 79–97)
MONOCYTES # BLD AUTO: 0.41 10*3/MM3 (ref 0.1–0.9)
MONOCYTES NFR BLD AUTO: 6.1 % (ref 5–12)
NEUTROPHILS NFR BLD AUTO: 3.69 10*3/MM3 (ref 1.7–7)
NEUTROPHILS NFR BLD AUTO: 55.4 % (ref 42.7–76)
NRBC BLD AUTO-RTO: 0 /100 WBC (ref 0–0.2)
PLATELET # BLD AUTO: 230 10*3/MM3 (ref 140–450)
PMV BLD AUTO: 9.6 FL (ref 6–12)
RBC # BLD AUTO: 4.44 10*6/MM3 (ref 3.77–5.28)
WBC NRBC COR # BLD: 6.68 10*3/MM3 (ref 3.4–10.8)

## 2022-04-04 PROCEDURE — 85025 COMPLETE CBC W/AUTO DIFF WBC: CPT

## 2022-04-04 PROCEDURE — 36415 COLL VENOUS BLD VENIPUNCTURE: CPT

## 2022-04-05 DIAGNOSIS — R79.89 ABNORMAL CBC: Primary | ICD-10-CM

## 2022-04-06 ENCOUNTER — TELEPHONE (OUTPATIENT)
Dept: NEUROLOGY | Facility: CLINIC | Age: 52
End: 2022-04-06

## 2022-04-06 NOTE — TELEPHONE ENCOUNTER
Provider: MICKIE  Caller: PATIENT  Relationship to Patient: SELF  Pharmacy: N/A  Phone Number: [phone]  Reason for Call: PATIENT TELEPHONED TO ADVISE SHE NEEDS AN UPDATED PRIOR AUTHORIZATION FOR HER EMGALITY.    WEB SITE: [email or web address]    PHONE: [phone]    THANK YOU.

## 2022-04-08 RX ORDER — LANSOPRAZOLE 30 MG/1
CAPSULE, DELAYED RELEASE ORAL
Qty: 90 CAPSULE | Refills: 0 | OUTPATIENT
Start: 2022-04-08

## 2022-04-11 RX ORDER — SEMAGLUTIDE 1.34 MG/ML
INJECTION, SOLUTION SUBCUTANEOUS
Qty: 3 ML | Refills: 0 | Status: SHIPPED | OUTPATIENT
Start: 2022-04-11 | End: 2022-05-13

## 2022-04-18 ENCOUNTER — LAB (OUTPATIENT)
Dept: LAB | Facility: HOSPITAL | Age: 52
End: 2022-04-18

## 2022-04-18 DIAGNOSIS — R79.89 ABNORMAL CBC: ICD-10-CM

## 2022-04-18 LAB
BASOPHILS # BLD AUTO: 0.05 10*3/MM3 (ref 0–0.2)
BASOPHILS NFR BLD AUTO: 0.5 % (ref 0–1.5)
DEPRECATED RDW RBC AUTO: 40.6 FL (ref 37–54)
EOSINOPHIL # BLD AUTO: 0.6 10*3/MM3 (ref 0–0.4)
EOSINOPHIL NFR BLD AUTO: 5.9 % (ref 0.3–6.2)
ERYTHROCYTE [DISTWIDTH] IN BLOOD BY AUTOMATED COUNT: 12.5 % (ref 12.3–15.4)
HCT VFR BLD AUTO: 38.3 % (ref 34–46.6)
HGB BLD-MCNC: 13.8 G/DL (ref 12–15.9)
IMM GRANULOCYTES # BLD AUTO: 0.03 10*3/MM3 (ref 0–0.05)
IMM GRANULOCYTES NFR BLD AUTO: 0.3 % (ref 0–0.5)
LYMPHOCYTES # BLD AUTO: 2.55 10*3/MM3 (ref 0.7–3.1)
LYMPHOCYTES NFR BLD AUTO: 25.2 % (ref 19.6–45.3)
MCH RBC QN AUTO: 32.6 PG (ref 26.6–33)
MCHC RBC AUTO-ENTMCNC: 36 G/DL (ref 31.5–35.7)
MCV RBC AUTO: 90.5 FL (ref 79–97)
MONOCYTES # BLD AUTO: 0.64 10*3/MM3 (ref 0.1–0.9)
MONOCYTES NFR BLD AUTO: 6.3 % (ref 5–12)
NEUTROPHILS NFR BLD AUTO: 6.23 10*3/MM3 (ref 1.7–7)
NEUTROPHILS NFR BLD AUTO: 61.8 % (ref 42.7–76)
NRBC BLD AUTO-RTO: 0 /100 WBC (ref 0–0.2)
PLATELET # BLD AUTO: 221 10*3/MM3 (ref 140–450)
PMV BLD AUTO: 9.8 FL (ref 6–12)
RBC # BLD AUTO: 4.23 10*6/MM3 (ref 3.77–5.28)
WBC NRBC COR # BLD: 10.1 10*3/MM3 (ref 3.4–10.8)

## 2022-04-18 PROCEDURE — 36415 COLL VENOUS BLD VENIPUNCTURE: CPT

## 2022-04-18 PROCEDURE — 85025 COMPLETE CBC W/AUTO DIFF WBC: CPT

## 2022-04-19 DIAGNOSIS — R89.8 EOSINOPHIL COUNT RAISED: Primary | ICD-10-CM

## 2022-05-13 RX ORDER — SEMAGLUTIDE 1.34 MG/ML
INJECTION, SOLUTION SUBCUTANEOUS
Qty: 3 ML | Refills: 0 | Status: SHIPPED | OUTPATIENT
Start: 2022-05-13 | End: 2022-06-06

## 2022-05-17 RX ORDER — ATORVASTATIN CALCIUM 10 MG/1
TABLET, FILM COATED ORAL
Qty: 90 TABLET | Refills: 1 | Status: SHIPPED | OUTPATIENT
Start: 2022-05-17 | End: 2022-06-24 | Stop reason: SDUPTHER

## 2022-05-31 RX ORDER — DICYCLOMINE HCL 20 MG
20 TABLET ORAL EVERY 6 HOURS
Qty: 20 TABLET | Refills: 0 | Status: SHIPPED | OUTPATIENT
Start: 2022-05-31 | End: 2022-07-27 | Stop reason: SDUPTHER

## 2022-05-31 NOTE — TELEPHONE ENCOUNTER
Sending in refill for dicyclomine with no refills will need to make appointment on 06/23/2022 for additional refill

## 2022-06-03 ENCOUNTER — HOSPITAL ENCOUNTER (OUTPATIENT)
Dept: MAMMOGRAPHY | Facility: HOSPITAL | Age: 52
Discharge: HOME | End: 2022-06-03
Admitting: NURSE PRACTITIONER

## 2022-06-03 DIAGNOSIS — Z12.31 ENCOUNTER FOR SCREENING MAMMOGRAM FOR MALIGNANT NEOPLASM OF BREAST: Primary | ICD-10-CM

## 2022-06-03 DIAGNOSIS — Z12.31 ENCOUNTER FOR SCREENING MAMMOGRAM FOR MALIGNANT NEOPLASM OF BREAST: ICD-10-CM

## 2022-06-03 PROCEDURE — 77067 SCR MAMMO BI INCL CAD: CPT

## 2022-06-03 PROCEDURE — 77063 BREAST TOMOSYNTHESIS BI: CPT

## 2022-06-06 RX ORDER — SEMAGLUTIDE 1.34 MG/ML
INJECTION, SOLUTION SUBCUTANEOUS
Qty: 3 ML | Refills: 0 | Status: SHIPPED | OUTPATIENT
Start: 2022-06-06 | End: 2022-06-24 | Stop reason: SDUPTHER

## 2022-06-20 ENCOUNTER — OFFICE VISIT (OUTPATIENT)
Dept: NEUROLOGY | Facility: CLINIC | Age: 52
End: 2022-06-20

## 2022-06-20 ENCOUNTER — LAB (OUTPATIENT)
Dept: LAB | Facility: HOSPITAL | Age: 52
End: 2022-06-20

## 2022-06-20 VITALS
HEIGHT: 62 IN | HEART RATE: 73 BPM | BODY MASS INDEX: 28.91 KG/M2 | DIASTOLIC BLOOD PRESSURE: 78 MMHG | SYSTOLIC BLOOD PRESSURE: 126 MMHG | WEIGHT: 157.1 LBS

## 2022-06-20 DIAGNOSIS — R89.8 EOSINOPHIL COUNT RAISED: ICD-10-CM

## 2022-06-20 DIAGNOSIS — G43.019 INTRACTABLE MIGRAINE WITHOUT AURA AND WITHOUT STATUS MIGRAINOSUS: Primary | ICD-10-CM

## 2022-06-20 DIAGNOSIS — E11.65 TYPE 2 DIABETES MELLITUS WITH HYPERGLYCEMIA, WITHOUT LONG-TERM CURRENT USE OF INSULIN: ICD-10-CM

## 2022-06-20 LAB
ALBUMIN SERPL-MCNC: 4.5 G/DL (ref 3.5–5.2)
ALBUMIN UR-MCNC: 1.5 MG/DL
ALBUMIN/GLOB SERPL: 2 G/DL
ALP SERPL-CCNC: 88 U/L (ref 39–117)
ALT SERPL W P-5'-P-CCNC: 26 U/L (ref 1–33)
ANION GAP SERPL CALCULATED.3IONS-SCNC: 11.5 MMOL/L (ref 5–15)
AST SERPL-CCNC: 22 U/L (ref 1–32)
BACTERIA UR QL AUTO: ABNORMAL /HPF
BASOPHILS # BLD AUTO: 0.03 10*3/MM3 (ref 0–0.2)
BASOPHILS NFR BLD AUTO: 0.4 % (ref 0–1.5)
BILIRUB SERPL-MCNC: 0.8 MG/DL (ref 0–1.2)
BILIRUB UR QL STRIP: NEGATIVE
BUN SERPL-MCNC: 9 MG/DL (ref 6–20)
BUN/CREAT SERPL: 11.5 (ref 7–25)
CALCIUM SPEC-SCNC: 9.9 MG/DL (ref 8.6–10.5)
CHLORIDE SERPL-SCNC: 104 MMOL/L (ref 98–107)
CHOLEST SERPL-MCNC: 186 MG/DL (ref 0–200)
CLARITY UR: ABNORMAL
CO2 SERPL-SCNC: 25.5 MMOL/L (ref 22–29)
COD CRY URNS QL: ABNORMAL /HPF
COLOR UR: ABNORMAL
CREAT SERPL-MCNC: 0.78 MG/DL (ref 0.57–1)
DEPRECATED RDW RBC AUTO: 39.1 FL (ref 37–54)
EGFRCR SERPLBLD CKD-EPI 2021: 92.7 ML/MIN/1.73
EOSINOPHIL # BLD AUTO: 0.29 10*3/MM3 (ref 0–0.4)
EOSINOPHIL NFR BLD AUTO: 4 % (ref 0.3–6.2)
ERYTHROCYTE [DISTWIDTH] IN BLOOD BY AUTOMATED COUNT: 11.9 % (ref 12.3–15.4)
GLOBULIN UR ELPH-MCNC: 2.3 GM/DL
GLUCOSE SERPL-MCNC: 136 MG/DL (ref 65–99)
GLUCOSE UR STRIP-MCNC: NEGATIVE MG/DL
HBA1C MFR BLD: 5.9 % (ref 4.8–5.6)
HCT VFR BLD AUTO: 41.7 % (ref 34–46.6)
HDLC SERPL-MCNC: 34 MG/DL (ref 40–60)
HGB BLD-MCNC: 14.6 G/DL (ref 12–15.9)
HGB UR QL STRIP.AUTO: NEGATIVE
HYALINE CASTS UR QL AUTO: ABNORMAL /LPF
IMM GRANULOCYTES # BLD AUTO: 0.01 10*3/MM3 (ref 0–0.05)
IMM GRANULOCYTES NFR BLD AUTO: 0.1 % (ref 0–0.5)
KETONES UR QL STRIP: ABNORMAL
LDLC SERPL CALC-MCNC: 107 MG/DL (ref 0–100)
LDLC/HDLC SERPL: 2.95 {RATIO}
LEUKOCYTE ESTERASE UR QL STRIP.AUTO: ABNORMAL
LYMPHOCYTES # BLD AUTO: 2.07 10*3/MM3 (ref 0.7–3.1)
LYMPHOCYTES NFR BLD AUTO: 28.7 % (ref 19.6–45.3)
MCH RBC QN AUTO: 31.5 PG (ref 26.6–33)
MCHC RBC AUTO-ENTMCNC: 35 G/DL (ref 31.5–35.7)
MCV RBC AUTO: 90.1 FL (ref 79–97)
MONOCYTES # BLD AUTO: 0.45 10*3/MM3 (ref 0.1–0.9)
MONOCYTES NFR BLD AUTO: 6.2 % (ref 5–12)
NEUTROPHILS NFR BLD AUTO: 4.37 10*3/MM3 (ref 1.7–7)
NEUTROPHILS NFR BLD AUTO: 60.6 % (ref 42.7–76)
NITRITE UR QL STRIP: NEGATIVE
NRBC BLD AUTO-RTO: 0 /100 WBC (ref 0–0.2)
PH UR STRIP.AUTO: 5.5 [PH] (ref 5–8)
PLATELET # BLD AUTO: 276 10*3/MM3 (ref 140–450)
PMV BLD AUTO: 9.8 FL (ref 6–12)
POTASSIUM SERPL-SCNC: 4 MMOL/L (ref 3.5–5.2)
PROT SERPL-MCNC: 6.8 G/DL (ref 6–8.5)
PROT UR QL STRIP: NEGATIVE
RBC # BLD AUTO: 4.63 10*6/MM3 (ref 3.77–5.28)
RBC # UR STRIP: ABNORMAL /HPF
REF LAB TEST METHOD: ABNORMAL
SODIUM SERPL-SCNC: 141 MMOL/L (ref 136–145)
SP GR UR STRIP: 1.02 (ref 1–1.03)
SQUAMOUS #/AREA URNS HPF: ABNORMAL /HPF
TRIGL SERPL-MCNC: 258 MG/DL (ref 0–150)
UROBILINOGEN UR QL STRIP: ABNORMAL
VLDLC SERPL-MCNC: 45 MG/DL (ref 5–40)
WBC # UR STRIP: ABNORMAL /HPF
WBC NRBC COR # BLD: 7.22 10*3/MM3 (ref 3.4–10.8)

## 2022-06-20 PROCEDURE — 81001 URINALYSIS AUTO W/SCOPE: CPT

## 2022-06-20 PROCEDURE — 80053 COMPREHEN METABOLIC PANEL: CPT

## 2022-06-20 PROCEDURE — 99213 OFFICE O/P EST LOW 20 MIN: CPT | Performed by: NURSE PRACTITIONER

## 2022-06-20 PROCEDURE — 36415 COLL VENOUS BLD VENIPUNCTURE: CPT

## 2022-06-20 PROCEDURE — 80061 LIPID PANEL: CPT

## 2022-06-20 PROCEDURE — 85025 COMPLETE CBC W/AUTO DIFF WBC: CPT

## 2022-06-20 PROCEDURE — 83036 HEMOGLOBIN GLYCOSYLATED A1C: CPT

## 2022-06-20 PROCEDURE — 82043 UR ALBUMIN QUANTITATIVE: CPT

## 2022-06-20 RX ORDER — GALCANEZUMAB 120 MG/ML
120 INJECTION, SOLUTION SUBCUTANEOUS
Qty: 1 PEN | Refills: 5 | Status: SHIPPED | OUTPATIENT
Start: 2022-06-20 | End: 2022-12-20 | Stop reason: SDUPTHER

## 2022-06-20 RX ORDER — SUMATRIPTAN 100 MG/1
100 TABLET, FILM COATED ORAL AS NEEDED
Qty: 12 TABLET | Refills: 5 | Status: SHIPPED | OUTPATIENT
Start: 2022-06-20 | End: 2022-12-20 | Stop reason: SDUPTHER

## 2022-06-20 NOTE — ASSESSMENT & PLAN NOTE
Continue Emgality for preventative therapy of migraine and Imitrex as needed for abortive therapy.

## 2022-06-20 NOTE — PROGRESS NOTES
"Chief Complaint  Migraine    Subjective          Laila Bell presents to [facility]  States she continues to do well on Emgality for preventative therapy of migraine.  Having less than 4 headache days per month.  Uses Imitrex with good result for abortive therapy.  Denies side effects.      Interval History:  She reports that she developed headaches many years ago. Since that time, her headaches have progressively worsened.   Currently, she reports headaches that are located frontal and retro-orbitally. She characterizes the headaches as 8/10 in severity, throbbing in nature with associated photophobia and phonophobia. Her headaches last 8-10 hours. She reports 16+ headache days per month. She denies associated aura. She denies focal numbness, weakness, speech, and vision changes.   Triggers: Stress, Inadequate sleep, Weather, Lights, and odor   Symptoms improved by: Dark quiet room and Sleep   She states she is sleeping fairly well. Reports getting 6-7 hours of sleep per night. Denies snoring. Reports refreshing sleep.   Prior prophylactic medications include: Topamax, amitriptyline, Inderal, Depakote, aimovig, emgality   She uses abortive therapy such as: Imitrex, maxalt, ibuprofen, Excedrin, tylenol   Caffeine Use: minimal   Independently Reviewed: MRI brain and Prior PCP records   History of Kidney Stones: No   She denies a family history of cerebral aneurysm      Objective   Vital Signs:   /78   Pulse 73   Ht 157.5 cm (62\")   Wt 71.3 kg (157 lb 1.6 oz)   BMI 28.73 kg/m²     Physical Exam  HENT:      Head: Normocephalic.   Pulmonary:      Effort: Pulmonary effort is normal.   Neurological:      Mental Status: She is alert and oriented to person, place, and time.      Cranial Nerves: Cranial nerves are intact.      Sensory: Sensation is intact.      Motor: Motor function is intact.      Coordination: Coordination is intact.      Deep Tendon Reflexes: " Reflexes are normal and symmetric.        Neurologic Exam     Mental Status   Oriented to person, place, and time.        Result Review :               Assessment and Plan    Diagnoses and all orders for this visit:    1. Intractable migraine without aura and without status migrainosus (Primary)  Assessment & Plan:  Continue Emgality for preventative therapy of migraine and Imitrex as needed for abortive therapy.       Other orders  -     galcanezumab-gnlm (Emgality) 120 MG/ML auto-injector pen; Inject 1 mL under the skin into the appropriate area as directed Every 30 (Thirty) Days.  Dispense: 1 pen; Refill: 5  -     SUMAtriptan (Imitrex) 100 MG tablet; Take 1 tablet by mouth As Needed for Migraine.  Dispense: 12 tablet; Refill: 5      Follow Up   Return in about 6 months (around 12/20/2022) for Migraine f/u.  Patient was given instructions and counseling regarding her condition or for health maintenance advice. Please see specific information pulled into the AVS if appropriate.

## 2022-06-22 NOTE — PROGRESS NOTES
Chief Complaint   Abd. Pain    History of Present Illness       Laila Bell is a 51 y.o. female who presents to [facility] for follow-up with a history of IBS mixed, reflux and colon polyps.  Patient reports back in March that she was seen in the emergency department for right lower quadrant abdominal pain that had progressed and worsened.  She developed vomiting due to the pain.  She reports the pain has improved over time.  She uses Bentyl which was provided by the ER which does provide relief.  Patient reports since being seen last that she had lost weight and was able to come off her lansoprazole but reports that her reflux has returned.  Patient reports being on Ozempic for the past year and having some associated reflux and belching with this medication.  She continues with IBS symptoms with generalized abdominal discomfort and loose stools at times with urgency.  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  This does cause increased anxiety.  She reports mainly loose stools with some constipation but feels this is related to use of Imodium.  Patient is not eating throughout the day as this could lead to loose bowel movements.  She has a history of cholecystectomy.  Patient denies fever, nausea, vomiting, weight loss, night sweats, melena, hematochezia, hematemesis.    Endoscopy: Review of the patient's most recent EGD and colonoscopy performed by Dr. Berry on 05.11.2020 without the esophagus.  Normal colon.  Repeat colonoscopy in 3 years due to colon prep.    Ct abdomen and pelvis with contrast preformed on 03.28.2022 no acute findings seen.  No evidence for appendicitis.  Previous cholecystectomy and hysterectomy.    Labs preformed on 06.20.2022 see below.      Results       Result Review :       CMP    CMP 12/10/21 3/28/22 6/20/22   Glucose 108 (A) 197 (A) 136 (A)   BUN 9 10 9   Creatinine 0.70 0.78 0.78   eGFR Non African Am 89     Sodium 140  140 141   Potassium 4.0 4.0 4.0   Chloride 103 102 104   Calcium 9.6 10.1 9.9   Albumin 4.40 4.60 4.50   Total Bilirubin 1.1 1.1 0.8   Alkaline Phosphatase 80 103 88   AST (SGOT) 19 21 22   ALT (SGPT) 27 26 26   (A) Abnormal value            CBC    CBC 4/4/22 4/18/22 6/20/22   WBC 6.68 10.10 7.22   RBC 4.44 4.23 4.63   Hemoglobin 14.1 13.8 14.6   Hematocrit 39.7 38.3 41.7   MCV 89.4 90.5 90.1   MCH 31.8 32.6 31.5   MCHC 35.5 36.0 (A) 35.0   RDW 11.7 (A) 12.5 11.9 (A)   Platelets 230 221 276   (A) Abnormal value                          Past Medical History       Past Medical History:   Diagnosis Date   • Abnormal ECG 08/16/2016   • Abnormal Pap smear of cervix 2011   • Acid reflux    • Allergic rhinitis    • Bursitis of left hip 04/06/2018   • Colon polyps    • Constipation    • CTS (carpal tunnel syndrome) 1998   • Diabetes mellitus, type 2 ([facility]) 05/08/2019   • Diarrhea    • DM2 (diabetes mellitus, type 2) ([facility]) 05/08/2019   • Fatigue    • Frequent headaches    • Gastroesophageal reflux    • GERD (gastroesophageal reflux disease)    • Gestational diabetes    • Headache, tension-type 1989   • History of screening mammography 02/2019 2020 scheduled   • HLD (hyperlipidemia)    • HPV in female 2013   • Hyperlipidemia    • Hypertension    • Hypotension    • IBS (irritable bowel syndrome)    • LGSIL on Pap smear of cervix 2012   • LGSIL on Pap smear of cervix 2011   • Menorrhagia    • Migraine    • Mild dysplasia of cervix (DOMINICK I)    • Mild dysplasia of cervix (DOMINICK I) 2011   • Numbness and tingling     in feet   • Pain of left hip joint 04/06/2018   • Screening mammogram, encounter for 02/2019 2020 SCHEDULED    • Sinus trouble    • Tendinitis of left hip 04/06/2018   • Urinary frequency    • Urinary urgency        Past Surgical History:   Procedure Laterality Date   • ABDOMINAL HYSTERECTOMY     • CARPAL TUNNEL RELEASE  1999   • CHOLECYSTECTOMY     • COLONOSCOPY  2020 2015 & 2020    • COLONOSCOPY  2015, 2020   •  COLPOSCOPY  03/23/2011   • D & C HYSTEROSCOPY ENDOMETRIAL ABLATION     • ENDOSCOPY  2020   • HYSTERECTOMY  2004   • LAPAROSCOPIC TUBAL LIGATION W/ FALOPE RING  2003   • MAMMO STEREOTACTIC BREAST BIOPSY 1ST W WO DEVICE Right 2007   • TONSILLECTOMY  1990   • TUMOR REMOVAL  2019    transsphenoidal         Current Outpatient Medications:   •  atorvastatin (LIPITOR) 10 MG tablet, TAKE 1 TABLET BY MOUTH EVERY NIGHT AT BEDTIME, Disp: 90 tablet, Rfl: 1  •  dicyclomine (BENTYL) 20 MG tablet, Take 1 tablet by mouth Every 6 (Six) Hours. Will need to make 06/23/2022 appointment with Leti for additional refills, Disp: 20 tablet, Rfl: 0  •  galcanezumab-gnlm (Emgality) 120 MG/ML auto-injector pen, Inject 1 mL under the skin into the appropriate area as directed Every 30 (Thirty) Days., Disp: 1 pen, Rfl: 5  •  metoprolol succinate XL (TOPROL-XL) 25 MG 24 hr tablet, Take 2 tablets by mouth Daily., Disp: 180 tablet, Rfl: 1  •  Ozempic, 1 MG/DOSE, 4 MG/3ML solution pen-injector, INJECT 1MG SUBCUTANEOUSLY INTO THE ABDOMEN, THIGH, OR UPPER ARM (ROTATING INJECTION SITES) EVERY WEEK ON THE SAME DAY, Disp: 3 mL, Rfl: 0  •  SUMAtriptan (Imitrex) 100 MG tablet, Take 1 tablet by mouth As Needed for Migraine., Disp: 12 tablet, Rfl: 5  •  colestipol (Colestid) 1 g tablet, Take 1 tablet by mouth 2 (Two) Times a Day., Disp: 60 tablet, Rfl: 3  •  lansoprazole (PREVACID) 30 MG capsule, Take 1 capsule by mouth Daily., Disp: 30 capsule, Rfl: 5     Allergies   Allergen Reactions   • Dulaglutide Itching       Family History   Problem Relation Age of Onset   • Hypertension Father    • Heart disease Father    • Diabetes Father    • Arthritis Father    • Liver disease Father    • Cirrhosis Father    • Liver cancer Father    • Kidney disease Paternal Grandfather    • Heart disease Paternal Grandfather    • Colon cancer Paternal Grandfather    • Colon polyps Paternal Grandfather    • Uterine cancer Maternal Grandmother    • Migraines Maternal Grandmother   "  • Arthritis Mother    • Liver disease Mother    • Migraines Mother    • Liver cancer Other    • Lung cancer Other    • Migraines Maternal Aunt    • Migraines Paternal Aunt         Social History     Social History Narrative    ** Merged History Encounter **         CLAUSTROPHOBIC - YES        Objective     Vital Signs:   /77 (BP Location: Right arm, Patient Position: Sitting, Cuff Size: Adult)   Pulse 77   Ht 157.5 cm (62\")   Wt 71.2 kg (157 lb)   SpO2 100%   BMI 28.72 kg/m²       Physical Exam  Constitutional:       General: She is not in acute distress.     Appearance: Normal appearance. She is well-developed and normal weight.   Eyes:      Conjunctiva/sclera: Conjunctivae normal.      Pupils: Pupils are equal, round, and reactive to light.      Visual Fields: Right eye visual fields normal and left eye visual fields normal.   Cardiovascular:      Rate and Rhythm: Normal rate and regular rhythm.      Heart sounds: Normal heart sounds.   Pulmonary:      Effort: Pulmonary effort is normal. No retractions.      Breath sounds: Normal breath sounds and air entry.      Comments: Inspection of chest: normal appearance  Abdominal:      General: Bowel sounds are normal.      Palpations: Abdomen is soft.      Tenderness: There is no abdominal tenderness.      Comments: No appreciable hepatosplenomegaly   Musculoskeletal:      Cervical back: Neck supple.      Right lower leg: No edema.      Left lower leg: No edema.   Lymphadenopathy:      Cervical: No cervical adenopathy.   Skin:     Findings: No lesion.      Comments: Turgor normal   Neurological:      Mental Status: She is alert and oriented to person, place, and time.   Psychiatric:         Mood and Affect: Mood and affect normal.           Assessment & Plan          Assessment and Plan    Diagnoses and all orders for this visit:    1. Gastroesophageal reflux disease, unspecified whether esophagitis present (Primary)    2. Hiatal hernia    3. Personal " history of colonic polyps    4. Irritable bowel syndrome with both constipation and diarrhea    Other orders  -     colestipol (Colestid) 1 g tablet; Take 1 tablet by mouth 2 (Two) Times a Day.  Dispense: 60 tablet; Refill: 3  -     lansoprazole (PREVACID) 30 MG capsule; Take 1 capsule by mouth Daily.  Dispense: 30 capsule; Refill: 5      51-year-old female presenting the office today with a history of reflux, hiatal hernia, personal history of colon polyps and IBS.  We will trial colestipol 1 tablet daily for the patient's fecal urgency with history of cholecystectomy.  I have educated patient on titrating the dose.  I have very added Prevacid 30 mg daily as this provided good relief for her reflux in the past.  We will follow-up in the office in 3 months.  Patient agreeable to this plan will call with any questions or concerns.          Follow Up       Follow Up   Return in about 3 months (around 9/23/2022).  Patient was given instructions and counseling regarding her condition or for health maintenance advice. Please see specific information pulled into the AVS if appropriate.

## 2022-06-23 ENCOUNTER — OFFICE VISIT (OUTPATIENT)
Dept: GASTROENTEROLOGY | Facility: CLINIC | Age: 52
End: 2022-06-23

## 2022-06-23 VITALS
BODY MASS INDEX: 28.89 KG/M2 | SYSTOLIC BLOOD PRESSURE: 132 MMHG | DIASTOLIC BLOOD PRESSURE: 77 MMHG | WEIGHT: 157 LBS | HEIGHT: 62 IN | OXYGEN SATURATION: 100 % | HEART RATE: 77 BPM

## 2022-06-23 DIAGNOSIS — K58.2 IRRITABLE BOWEL SYNDROME WITH BOTH CONSTIPATION AND DIARRHEA: ICD-10-CM

## 2022-06-23 DIAGNOSIS — K21.9 GASTROESOPHAGEAL REFLUX DISEASE, UNSPECIFIED WHETHER ESOPHAGITIS PRESENT: Primary | ICD-10-CM

## 2022-06-23 DIAGNOSIS — K44.9 HIATAL HERNIA: ICD-10-CM

## 2022-06-23 DIAGNOSIS — Z86.010 PERSONAL HISTORY OF COLONIC POLYPS: ICD-10-CM

## 2022-06-23 PROCEDURE — 99214 OFFICE O/P EST MOD 30 MIN: CPT | Performed by: NURSE PRACTITIONER

## 2022-06-23 RX ORDER — MONTELUKAST SODIUM 4 MG/1
1 TABLET, CHEWABLE ORAL 2 TIMES DAILY
Qty: 60 TABLET | Refills: 3 | Status: SHIPPED | OUTPATIENT
Start: 2022-06-23

## 2022-06-23 RX ORDER — LANSOPRAZOLE 30 MG/1
30 CAPSULE, DELAYED RELEASE ORAL DAILY
Qty: 30 CAPSULE | Refills: 5 | Status: SHIPPED | OUTPATIENT
Start: 2022-06-23 | End: 2022-12-27

## 2022-06-24 ENCOUNTER — OFFICE VISIT (OUTPATIENT)
Dept: FAMILY MEDICINE CLINIC | Facility: CLINIC | Age: 52
End: 2022-06-24

## 2022-06-24 ENCOUNTER — PATIENT MESSAGE (OUTPATIENT)
Dept: GASTROENTEROLOGY | Facility: CLINIC | Age: 52
End: 2022-06-24

## 2022-06-24 VITALS
BODY MASS INDEX: 28.93 KG/M2 | WEIGHT: 157.2 LBS | RESPIRATION RATE: 16 BRPM | SYSTOLIC BLOOD PRESSURE: 104 MMHG | TEMPERATURE: 97.1 F | DIASTOLIC BLOOD PRESSURE: 80 MMHG | HEIGHT: 62 IN | HEART RATE: 84 BPM | OXYGEN SATURATION: 98 %

## 2022-06-24 DIAGNOSIS — I10 HYPERTENSION, UNSPECIFIED TYPE: ICD-10-CM

## 2022-06-24 DIAGNOSIS — E11.65 TYPE 2 DIABETES MELLITUS WITH HYPERGLYCEMIA, WITHOUT LONG-TERM CURRENT USE OF INSULIN: ICD-10-CM

## 2022-06-24 DIAGNOSIS — Z11.59 NEED FOR HEPATITIS C SCREENING TEST: Primary | ICD-10-CM

## 2022-06-24 DIAGNOSIS — E78.2 MIXED HYPERLIPIDEMIA: ICD-10-CM

## 2022-06-24 DIAGNOSIS — K76.0 FATTY LIVER: Primary | ICD-10-CM

## 2022-06-24 DIAGNOSIS — K76.0 FATTY LIVER: ICD-10-CM

## 2022-06-24 DIAGNOSIS — Z00.00 ANNUAL PHYSICAL EXAM: ICD-10-CM

## 2022-06-24 PROCEDURE — 99396 PREV VISIT EST AGE 40-64: CPT | Performed by: NURSE PRACTITIONER

## 2022-06-24 RX ORDER — METOPROLOL SUCCINATE 25 MG/1
50 TABLET, EXTENDED RELEASE ORAL DAILY
Qty: 180 TABLET | Refills: 1 | Status: SHIPPED | OUTPATIENT
Start: 2022-06-24 | End: 2023-02-27 | Stop reason: SDUPTHER

## 2022-06-24 RX ORDER — SEMAGLUTIDE 1.34 MG/ML
1 INJECTION, SOLUTION SUBCUTANEOUS WEEKLY
Qty: 3 ML | Refills: 0 | Status: SHIPPED | OUTPATIENT
Start: 2022-06-24 | End: 2022-08-01

## 2022-06-24 RX ORDER — ATORVASTATIN CALCIUM 10 MG/1
10 TABLET, FILM COATED ORAL
Qty: 90 TABLET | Refills: 1 | Status: SHIPPED | OUTPATIENT
Start: 2022-06-24 | End: 2023-02-08 | Stop reason: SDUPTHER

## 2022-06-24 NOTE — PROGRESS NOTES
Answers for HPI/ROS submitted by the patient on 6/17/2022  What is the primary reason for your visit?: Diabetes  Diabetes type: type 2  MedicAlert ID: No  blurred vision: No  chest pain: No  fatigue: No  foot paresthesias: No  foot ulcerations: No  polydipsia: No  polyphagia: No  polyuria: No  visual change: No  weakness: No  weight loss: No  confusion: No  dizziness: No  headaches: Yes  hunger: No  mood changes: No  nervous/anxious: No  pallor: No  sleepiness: No  speech difficulty: No  sweats: No  tremors: No  blackouts: No  hospitalization: No  nocturnal hypoglycemia: No  required assistance: No  required glucagon: No  CVA: No  heart disease: No  impotence: No  nephropathy: No  peripheral neuropathy: No  PVD: No  retinopathy: No  CAD risks: dyslipidemia, family history, hypertension, stress  Current treatments: oral agent (monotherapy)  Treatment compliance: most of the time  Home blood tests: 1-2 x per week  Home urines: <1 x per month  breakfast time: after 10 am  breakfast glucose level: 110-130  lunch time: 11-12 pm  lunch glucose level: 110-130  dinner time: 5-6 pm  dinner glucose level: 110-130  High score: 110-130  Overall: 110-130  Weight trend: stable  Current diet: generally unhealthy  Meal planning: none, carbohydrate counting  Exercise: daily  Dietitian visit: No  Eye exam current: Yes  Sees podiatrist: No    Chief Complaint  Headache, Hyperlipidemia, and Annual Exam    Subjective        Laila Bell presents to [facility]  Some stresses.  Notes that she had a  with covid in September and loss of family in January and notes that she has a dad that had recent diagnosis of liver cancer.    Diabetes:  Doing well on medication.    Hyperlipidemia:  Doing well on medication.    Annual exam:  Discussed diet and exercise.          Headache  Hyperlipidemia  Pertinent negatives include no chest pain.   Diabetes  She has type 2 diabetes mellitus. No MedicAlert  identification noted. The initial diagnosis of diabetes was made 3 years ago. Hypoglycemia symptoms include headaches. Pertinent negatives for hypoglycemia include no confusion, dizziness, hunger, mood changes, nervousness/anxiousness, pallor, seizures, sleepiness, speech difficulty, sweats or tremors. Pertinent negatives for diabetes include no blurred vision, no chest pain, no fatigue, no foot paresthesias, no foot ulcerations, no polydipsia, no polyphagia, no polyuria, no visual change, no weakness and no weight loss. Pertinent negatives for hypoglycemia complications include no blackouts, no hospitalization, no nocturnal hypoglycemia, no required assistance and no required glucagon injection. Symptoms are stable. Pertinent negatives for diabetic complications include no CVA, heart disease, impotence, nephropathy, peripheral neuropathy, PVD or retinopathy. Risk factors for coronary artery disease include dyslipidemia, family history, hypertension and stress. Current diabetic treatment includes oral agent (monotherapy). She is compliant with treatment most of the time. Her weight is stable. She is following a generally unhealthy diet. Meal planning includes none and carbohydrate counting. She has not had a previous visit with a dietitian. She participates in exercise daily. She monitors blood glucose at home 1-2 x per week. She monitors urine at home <1 x per month. Blood glucose monitoring compliance is excellent. There is no change in her home blood glucose trend. Her breakfast blood glucose is taken after 10 am. Her breakfast blood glucose range is generally 110-130 mg/dl. Her lunch blood glucose is taken between 11-12 pm. Her lunch blood glucose range is generally 110-130 mg/dl. Her dinner blood glucose is taken between 5-6 pm. Her dinner blood glucose range is generally 110-130 mg/dl. Her highest blood glucose is 110-130 mg/dl. Her overall blood glucose range is 110-130 mg/dl. She does not see a  podiatrist.Eye exam is current.   Hypertension  Associated symptoms include headaches. Pertinent negatives include no blurred vision, chest pain or sweats. There is no history of CVA, PVD or retinopathy.   Sinusitis  Associated symptoms include headaches.   Fatigue  Associated symptoms include headaches. Pertinent negatives include no chest pain, fatigue, visual change or weakness.         Past History:    Medical History: has a past medical history of Abnormal ECG (08/16/2016), Abnormal Pap smear of cervix (2011), Acid reflux, Allergic rhinitis, Bursitis of left hip (04/06/2018), Colon polyps, Constipation, CTS (carpal tunnel syndrome) (1998), Diabetes mellitus, type 2 (HCC) (05/08/2019), Diarrhea, DM2 (diabetes mellitus, type 2) ([facility]) (05/08/2019), Fatigue, Frequent headaches, Gastroesophageal reflux, GERD (gastroesophageal reflux disease), Gestational diabetes, Headache, tension-type (1989), History of screening mammography (02/2019), HLD (hyperlipidemia), HPV in female (2013), Hyperlipidemia, Hypertension, Hypotension, IBS (irritable bowel syndrome), LGSIL on Pap smear of cervix (2012), LGSIL on Pap smear of cervix (2011), Menorrhagia, Migraine, Mild dysplasia of cervix (DOMINICK I), Mild dysplasia of cervix (DOMINICK I) (2011), Numbness and tingling, Pain of left hip joint (04/06/2018), Screening mammogram, encounter for (02/2019), Sinus trouble, Tendinitis of left hip (04/06/2018), Urinary frequency, and Urinary urgency.     Surgical History: has a past surgical history that includes Colonoscopy (2020); Esophagogastroduodenoscopy (2020); Abdominal hysterectomy; Hysterectomy (2004); Tonsillectomy (1990); Cholecystectomy; Mammo stereotactic breast biopsy 1st w wo device (Right, 2007); Colposcopy (03/23/2011); Laparoscopic tubal ligation w/ Falope ring (2003); d & c hysteroscopy endometrial ablation; Colonoscopy (2015, 2020); Carpal tunnel release (1999); and Tumor removal (2019).     Family History: family history  includes Arthritis in her father and mother; Cirrhosis in her father; Colon cancer in her paternal grandfather; Colon polyps in her paternal grandfather; Diabetes in her father; Heart disease in her father and paternal grandfather; Hypertension in her father; Kidney disease in her paternal grandfather; Liver cancer in her father and another family member; Liver disease in her father and mother; Lung cancer in an other family member; Migraines in her maternal aunt, maternal grandmother, mother, and paternal aunt; Uterine cancer in her maternal grandmother.     Social History: reports that she has never smoked. She has never used smokeless tobacco. She reports that she does not drink alcohol and does not use drugs.    Allergies: Dulaglutide          Current Outpatient Medications:   •  atorvastatin (LIPITOR) 10 MG tablet, Take 1 tablet by mouth every night at bedtime., Disp: 90 tablet, Rfl: 1  •  colestipol (Colestid) 1 g tablet, Take 1 tablet by mouth 2 (Two) Times a Day., Disp: 60 tablet, Rfl: 3  •  dicyclomine (BENTYL) 20 MG tablet, Take 1 tablet by mouth Every 6 (Six) Hours. Will need to make 06/23/2022 appointment with Leti for additional refills, Disp: 20 tablet, Rfl: 0  •  galcanezumab-gnlm (Emgality) 120 MG/ML auto-injector pen, Inject 1 mL under the skin into the appropriate area as directed Every 30 (Thirty) Days., Disp: 1 pen, Rfl: 5  •  lansoprazole (PREVACID) 30 MG capsule, Take 1 capsule by mouth Daily., Disp: 30 capsule, Rfl: 5  •  metoprolol succinate XL (TOPROL-XL) 25 MG 24 hr tablet, Take 2 tablets by mouth Daily., Disp: 180 tablet, Rfl: 1  •  Semaglutide, 1 MG/DOSE, (Ozempic, 1 MG/DOSE,) 4 MG/3ML solution pen-injector, Inject 1 mg under the skin into the appropriate area as directed 1 (One) Time Per Week., Disp: 3 mL, Rfl: 0  •  SUMAtriptan (Imitrex) 100 MG tablet, Take 1 tablet by mouth As Needed for Migraine., Disp: 12 tablet, Rfl: 5    Medications Discontinued During This Encounter   Medication  "Reason   • metoprolol succinate XL (TOPROL-XL) 25 MG 24 hr tablet Reorder   • atorvastatin (LIPITOR) 10 MG tablet Reorder   • Ozempic, 1 MG/DOSE, 4 MG/3ML solution pen-injector Reorder         Review of Systems   Constitutional: Negative for fatigue and unexpected weight loss.   Eyes: Negative for blurred vision.   Cardiovascular: Negative for chest pain.   Endocrine: Negative for polydipsia, polyphagia and polyuria.   Genitourinary: Negative for impotence.   Skin: Negative for pallor.   Neurological: Negative for dizziness, tremors, seizures, speech difficulty, weakness and confusion.   Psychiatric/Behavioral: The patient is not nervous/anxious.         Objective         Vitals:    06/24/22 0959   BP: 104/80   BP Location: Right arm   Patient Position: Sitting   Cuff Size: Adult   Pulse: 84   Resp: 16   Temp: 97.1 °F (36.2 °C)   TempSrc: Infrared   SpO2: 98%   Weight: 71.3 kg (157 lb 3.2 oz)   Height: 157.5 cm (62.01\")     Body mass index is 28.74 kg/m².         Physical Exam  Vitals reviewed.   Constitutional:       Appearance: Normal appearance. She is well-developed.   HENT:      Head: Normocephalic and atraumatic.      Mouth/Throat:      Pharynx: No oropharyngeal exudate.   Eyes:      Conjunctiva/sclera: Conjunctivae normal.      Pupils: Pupils are equal, round, and reactive to light.   Cardiovascular:      Rate and Rhythm: Normal rate and regular rhythm.      Pulses:           Dorsalis pedis pulses are 2+ on the right side and 2+ on the left side.      Heart sounds: Normal heart sounds. No murmur heard.    No friction rub. No gallop.   Pulmonary:      Effort: Pulmonary effort is normal.      Breath sounds: Normal breath sounds. No wheezing or rhonchi.   Abdominal:      General: Bowel sounds are normal.      Palpations: Abdomen is soft.   Feet:      Right foot:      Protective Sensation: 10 sites tested. 10 sites sensed.      Skin integrity: Skin integrity normal. No ulcer or blister.      Toenail Condition: " Right toenails are normal.      Left foot:      Protective Sensation: 10 sites tested. 10 sites sensed.      Skin integrity: Skin integrity normal. No ulcer or blister.      Toenail Condition: Left toenails are normal.      Comments:      Skin:     General: Skin is warm and dry.   Neurological:      Mental Status: She is alert and oriented to person, place, and time.   Psychiatric:         Mood and Affect: Mood and affect normal.         Behavior: Behavior normal.         Thought Content: Thought content normal.         Judgment: Judgment normal.             Result Review :               Assessment and Plan     Diagnoses and all orders for this visit:    1. Need for hepatitis C screening test (Primary)  -     Hepatitis C antibody; Future    2. Hypertension, unspecified type  -     metoprolol succinate XL (TOPROL-XL) 25 MG 24 hr tablet; Take 2 tablets by mouth Daily.  Dispense: 180 tablet; Refill: 1    3. Fatty liver    4. Type 2 diabetes mellitus with hyperglycemia, without long-term current use of insulin (HCC)  -     atorvastatin (LIPITOR) 10 MG tablet; Take 1 tablet by mouth every night at bedtime.  Dispense: 90 tablet; Refill: 1  -     Semaglutide, 1 MG/DOSE, (Ozempic, 1 MG/DOSE,) 4 MG/3ML solution pen-injector; Inject 1 mg under the skin into the appropriate area as directed 1 (One) Time Per Week.  Dispense: 3 mL; Refill: 0  -     CBC & Differential; Future  -     Comprehensive Metabolic Panel; Future  -     Hemoglobin A1c; Future  -     Urinalysis With Culture If Indicated -; Future  -     Lipid Panel; Future  -     MicroAlbumin, Urine, Random - Urine, Clean Catch; Future    5. Annual physical exam  Comments:  Discussed diet and exercise with the patient.  And reviewed immunizations.    6. Mixed hyperlipidemia  -     atorvastatin (LIPITOR) 10 MG tablet; Take 1 tablet by mouth every night at bedtime.  Dispense: 90 tablet; Refill: 1  -     Lipid Panel; Future              Follow Up     Return in about 6 months  (around 12/24/2022).    Patient was given instructions and counseling regarding her condition or for health maintenance advice. Please see specific information pulled into the AVS if appropriate.

## 2022-06-27 NOTE — TELEPHONE ENCOUNTER
From: Laila Bell  To: SONIDO Bermudez  Sent: 6/24/2022 1:55 PM EDT  Subject: Liver question     I saw Mychal Calvillo today and she told me to reach out to ask you a question. Would I be a candidate to have a fibrosure test? My scan in March showed mild fatty liver, I’m diabetic, my dad has SHARPE liver disease and liver cancer, and my mom has liver disease.   Thank you for your time.

## 2022-07-22 ENCOUNTER — PROCEDURE VISIT (OUTPATIENT)
Dept: OTHER | Facility: HOSPITAL | Age: 52
End: 2022-07-22

## 2022-07-22 DIAGNOSIS — K76.0 FATTY LIVER: Primary | ICD-10-CM

## 2022-07-22 PROCEDURE — 91200 LIVER ELASTOGRAPHY: CPT | Performed by: NURSE PRACTITIONER

## 2022-07-26 NOTE — PROGRESS NOTES
Liver Elastography  Performed by: Macarena Hoffman APRN  Authorized by: Leti Becker APRN   Ordering Provider: Leti Becker APRN    Probe:  M+  Procedure Details:  Procedure: After providing an oral and written explanation of the Fibroscan vibration controlled transient elastography (VTCE) test procedure to the patient. The patient was placed in supine position with right arm in maximum abduction to allow optimal exposure of right lateral abdomen. Patient was briefly assessed, identifying terminus of the xyphoid process and locating an ideal transient elastography testing site, midline and lateral to this point. Patient was instructed to breathe normally and remain stationary during the test process. Pre-measurement data confirmed the transient elastography probe was centered over the liver parenchyma. A series of ten 50 Hz mechanical pulses were applied with controlled application pressure to induce a mechanical shear wave in the liver tissue. For each measurement, the shear wave propagation speed was detected, displayed and converted to its equivalent liver stiffness value in kilopascals. Skin to liver capsules distance and shear wave characteristics were monitored during the entire examination to assure quality data. Median liver stiffness measurement and interquartile range were calculated and displayed in real time. Acquired measurement data was stored and submitted to the provider for review and interpretation. Patient tolerated the procedure well and was discharged without incident.   Clinical Information:     NPO 3 Hours or More: Yes      Actively Drinking: No    Findings:     Median Liver Stiffness Score:  8.6    Interquartile Range (IQR) to Median Ratio:  15    Marry Stiffness Consistent with:  F2    Current Scan Considered Reliab: Yes      Median Controlled Attenuation Parameter (dB/m):  270    IQR:  7    CAP SCORE:  Moderate/severe liver fat

## 2022-07-27 ENCOUNTER — TELEPHONE (OUTPATIENT)
Dept: GASTROENTEROLOGY | Facility: CLINIC | Age: 52
End: 2022-07-27

## 2022-07-27 DIAGNOSIS — K58.2 IRRITABLE BOWEL SYNDROME WITH BOTH CONSTIPATION AND DIARRHEA: Primary | ICD-10-CM

## 2022-07-27 RX ORDER — DICYCLOMINE HCL 20 MG
20 TABLET ORAL EVERY 6 HOURS
Qty: 120 TABLET | Refills: 3 | Status: SHIPPED | OUTPATIENT
Start: 2022-07-27 | End: 2022-08-26

## 2022-07-27 NOTE — TELEPHONE ENCOUNTER
----- Message from SONIDO Bermudez sent at 7/26/2022  3:18 PM EDT -----  Please call the patient and inform her that I have reviewed her FibroScan and it shows F2 fibrosis which is on a 0-4 scale so this is mild to moderate and moderate to severe fatty liver.  Continue with healthy lifestyle, add vitamin D and to her current regimen, recommend weight loss and exercise.

## 2022-08-01 DIAGNOSIS — E11.65 TYPE 2 DIABETES MELLITUS WITH HYPERGLYCEMIA, WITHOUT LONG-TERM CURRENT USE OF INSULIN: ICD-10-CM

## 2022-08-01 RX ORDER — SEMAGLUTIDE 1.34 MG/ML
INJECTION, SOLUTION SUBCUTANEOUS
Qty: 3 ML | Refills: 0 | Status: SHIPPED | OUTPATIENT
Start: 2022-08-01 | End: 2022-08-31 | Stop reason: SDUPTHER

## 2022-08-31 DIAGNOSIS — E11.65 TYPE 2 DIABETES MELLITUS WITH HYPERGLYCEMIA, WITHOUT LONG-TERM CURRENT USE OF INSULIN: ICD-10-CM

## 2022-08-31 RX ORDER — SEMAGLUTIDE 1.34 MG/ML
1 INJECTION, SOLUTION SUBCUTANEOUS WEEKLY
Qty: 3 ML | Refills: 0 | Status: SHIPPED | OUTPATIENT
Start: 2022-08-31 | End: 2022-09-01

## 2022-09-01 DIAGNOSIS — E11.65 TYPE 2 DIABETES MELLITUS WITH HYPERGLYCEMIA, WITHOUT LONG-TERM CURRENT USE OF INSULIN: ICD-10-CM

## 2022-09-01 RX ORDER — SEMAGLUTIDE 1.34 MG/ML
INJECTION, SOLUTION SUBCUTANEOUS
Qty: 3 ML | Refills: 0 | Status: SHIPPED | OUTPATIENT
Start: 2022-09-01 | End: 2022-09-27

## 2022-09-22 NOTE — PROGRESS NOTES
Chief Complaint  3  Month follow up  Laila Bell is a 51 y.o. female who presents to [facility] for 3 month follow up     History of present Illness  Patient presents to the office for 3 month follow up.  At previous office visit patient was given prescription for Colestid to help manage diarrhea and fecal urgency.  Patient reports that 1 Colestid a day has greatly improved symptoms.  Denies constipation, melena, hematochezia, and abdominal pain.  Patient's heartburn is well controlled with Prevacid 30 mg daily.  Denies breakthrough heartburn, epigastric pain, nausea, vomiting, and dysphagia.  Denies unintentional weight loss.    Liver elastography 07/22/2022 -S3, F2    CT abdomen pelvis with contrast 03/28/2022 -no acute findings, no evidence for appendicitis, evidence of previous cholecystectomy and hysterectomy.    EGD/Colonoscopy 05/11/2020 by Dr. Berry-  small hiatal hernia, nonbleeding polyps in the fundus, and normal duodenum.  Normal mucosa throughout colon.  Repeat colonoscopy in 3 years due to quality of prep.      Past Medical History:   Diagnosis Date   • Abnormal ECG 08/16/2016   • Abnormal Pap smear of cervix 2011   • Acid reflux    • Allergic rhinitis    • Bursitis of left hip 04/06/2018   • Colon polyps    • Constipation    • CTS (carpal tunnel syndrome) 1998   • Diabetes mellitus, type 2 ([facility]) 05/08/2019   • Diarrhea    • DM2 (diabetes mellitus, type 2) ([facility]) 05/08/2019   • Fatigue    • Frequent headaches    • Gastroesophageal reflux    • GERD (gastroesophageal reflux disease)    • Gestational diabetes    • Headache, tension-type 1989   • History of screening mammography 02/2019 2020 scheduled   • HLD (hyperlipidemia)    • HPV in female 2013   • Hyperlipidemia    • Hypertension    • Hypotension    • IBS (irritable bowel syndrome)    • LGSIL on Pap smear of cervix 2012   • LGSIL on Pap smear of cervix 2011   • Menorrhagia    • Migraine    • Mild  dysplasia of cervix (DOMINICK I)    • Mild dysplasia of cervix (DOMINICK I) 2011   • Numbness and tingling     in feet   • Pain of left hip joint 04/06/2018   • Screening mammogram, encounter for 02/2019 2020 SCHEDULED    • Sinus trouble    • Tendinitis of left hip 04/06/2018   • Urinary frequency    • Urinary urgency        Past Surgical History:   Procedure Laterality Date   • ABDOMINAL HYSTERECTOMY     • CARPAL TUNNEL RELEASE  1999   • CHOLECYSTECTOMY     • COLONOSCOPY  2020 2015 & 2020    • COLONOSCOPY  2015, 2020   • COLPOSCOPY  03/23/2011   • D & C HYSTEROSCOPY ENDOMETRIAL ABLATION     • ENDOSCOPY  2020   • HYSTERECTOMY  2004   • LAPAROSCOPIC TUBAL LIGATION W/ FALOPE RING  2003   • MAMMO STEREOTACTIC BREAST BIOPSY 1ST W WO DEVICE Right 2007   • TONSILLECTOMY  1990   • TUMOR REMOVAL  2019    transsphenoidal   • UPPER GASTROINTESTINAL ENDOSCOPY  2020         Current Outpatient Medications:   •  atorvastatin (LIPITOR) 10 MG tablet, Take 1 tablet by mouth every night at bedtime., Disp: 90 tablet, Rfl: 1  •  colestipol (Colestid) 1 g tablet, Take 1 tablet by mouth 2 (Two) Times a Day., Disp: 60 tablet, Rfl: 3  •  galcanezumab-gnlm (Emgality) 120 MG/ML auto-injector pen, Inject 1 mL under the skin into the appropriate area as directed Every 30 (Thirty) Days., Disp: 1 pen, Rfl: 5  •  lansoprazole (PREVACID) 30 MG capsule, Take 1 capsule by mouth Daily., Disp: 30 capsule, Rfl: 5  •  metoprolol succinate XL (TOPROL-XL) 25 MG 24 hr tablet, Take 2 tablets by mouth Daily., Disp: 180 tablet, Rfl: 1  •  Ozempic, 1 MG/DOSE, 4 MG/3ML solution pen-injector, INJECT 1 MG UNDER THE SKIN INTO THE APPROPRIATE AREA AS DIRECTED 1(ONE) TIME PER WEEK, Disp: 3 mL, Rfl: 0  •  SUMAtriptan (Imitrex) 100 MG tablet, Take 1 tablet by mouth As Needed for Migraine., Disp: 12 tablet, Rfl: 5     Allergies   Allergen Reactions   • Dulaglutide Itching       Family History   Problem Relation Age of Onset   • Arthritis Mother    • Liver disease Mother   "  • Migraines Mother    • Hypertension Father    • Heart disease Father    • Diabetes Father    • Arthritis Father    • Liver disease Father    • Cirrhosis Father    • Liver cancer Father    • Migraines Maternal Aunt    • Migraines Paternal Aunt    • Uterine cancer Maternal Grandmother    • Migraines Maternal Grandmother    • Kidney disease Paternal Grandfather    • Heart disease Paternal Grandfather    • Colon cancer Paternal Grandfather 40   • Colon polyps Paternal Grandfather    • Liver cancer Other    • Lung cancer Other         Social History     Social History Narrative    ** Merged History Encounter **         CLAUSTROPHOBIC - YES        Objective       Vital Signs:   /81 (BP Location: Left arm, Patient Position: Sitting, Cuff Size: Adult)   Pulse 90   Ht 157.5 cm (62.01\")   Wt 71.7 kg (158 lb)   SpO2 100%   BMI 28.89 kg/m²       Physical Exam  Constitutional:       Appearance: Normal appearance.   HENT:      Head: Normocephalic.   Cardiovascular:      Rate and Rhythm: Normal rate and regular rhythm.      Heart sounds: Normal heart sounds.   Pulmonary:      Effort: Pulmonary effort is normal.      Breath sounds: Normal breath sounds.   Abdominal:      General: Bowel sounds are normal.      Palpations: Abdomen is soft.   Skin:     General: Skin is warm and dry.   Neurological:      Mental Status: She is alert and oriented to person, place, and time. Mental status is at baseline.   Psychiatric:         Mood and Affect: Mood normal.         Behavior: Behavior normal.         Thought Content: Thought content normal.         Judgment: Judgment normal.         Result Review :       CBC w/diff    CBC w/Diff 4/4/22 4/18/22 6/20/22   WBC 6.68 10.10 7.22   RBC 4.44 4.23 4.63   Hemoglobin 14.1 13.8 14.6   Hematocrit 39.7 38.3 41.7   MCV 89.4 90.5 90.1   MCH 31.8 32.6 31.5   MCHC 35.5 36.0 (A) 35.0   RDW 11.7 (A) 12.5 11.9 (A)   Platelets 230 221 276   Neutrophil Rel % 55.4 61.8 60.6   Immature Granulocyte Rel " % 0.3 0.3 0.1   Lymphocyte Rel % 29.3 25.2 28.7   Monocyte Rel % 6.1 6.3 6.2   Eosinophil Rel % 8.2 (A) 5.9 4.0   Basophil Rel % 0.7 0.5 0.4   (A) Abnormal value            CMP    CMP 12/10/21 3/28/22 6/20/22   Glucose 108 (A) 197 (A) 136 (A)   BUN 9 10 9   Creatinine 0.70 0.78 0.78   eGFR Non African Am 89     Sodium 140 140 141   Potassium 4.0 4.0 4.0   Chloride 103 102 104   Calcium 9.6 10.1 9.9   Albumin 4.40 4.60 4.50   Total Bilirubin 1.1 1.1 0.8   Alkaline Phosphatase 80 103 88   AST (SGOT) 19 21 22   ALT (SGPT) 27 26 26   (A) Abnormal value            Lipase   Lipase   Date Value Ref Range Status   03/28/2022 30 13 - 60 U/L Final           Assessment and Plan    Diagnoses and all orders for this visit:    1. Diarrhea, unspecified type (Primary)    2. Gastroesophageal reflux disease without esophagitis      51-year-old patient presents to the office for follow-up regarding diarrhea and GERD.  Patient's diarrhea and fecal urgency is better managed with Colestid 1 g daily, patient will continue this regimen.  Patient reports adequate control of heartburn with Prevacid 30 mg daily.  Overall patient is doing much better from a GI standpoint.  She will follow-up in 1 year.  Patient will be due for screening colonoscopy in 2023.  Patient is agreeable to plan will call the office for any questions or concerns.    Follow Up   No follow-ups on file.  Patient was given instructions and counseling regarding her condition or for health maintenance advice. Please see specific information pulled into the AVS if appropriate.

## 2022-09-23 ENCOUNTER — OFFICE VISIT (OUTPATIENT)
Dept: GASTROENTEROLOGY | Facility: CLINIC | Age: 52
End: 2022-09-23

## 2022-09-23 VITALS
DIASTOLIC BLOOD PRESSURE: 81 MMHG | BODY MASS INDEX: 29.08 KG/M2 | WEIGHT: 158 LBS | SYSTOLIC BLOOD PRESSURE: 125 MMHG | HEART RATE: 90 BPM | HEIGHT: 62 IN | OXYGEN SATURATION: 100 %

## 2022-09-23 DIAGNOSIS — K21.9 GASTROESOPHAGEAL REFLUX DISEASE WITHOUT ESOPHAGITIS: ICD-10-CM

## 2022-09-23 DIAGNOSIS — R19.7 DIARRHEA, UNSPECIFIED TYPE: Primary | ICD-10-CM

## 2022-09-23 PROCEDURE — 99213 OFFICE O/P EST LOW 20 MIN: CPT

## 2022-09-27 DIAGNOSIS — E11.65 TYPE 2 DIABETES MELLITUS WITH HYPERGLYCEMIA, WITHOUT LONG-TERM CURRENT USE OF INSULIN: ICD-10-CM

## 2022-09-27 RX ORDER — SEMAGLUTIDE 1.34 MG/ML
INJECTION, SOLUTION SUBCUTANEOUS
Qty: 3 ML | Refills: 0 | Status: SHIPPED | OUTPATIENT
Start: 2022-09-27 | End: 2022-11-21

## 2022-11-02 ENCOUNTER — PATIENT MESSAGE (OUTPATIENT)
Dept: FAMILY MEDICINE CLINIC | Facility: CLINIC | Age: 52
End: 2022-11-02

## 2022-11-02 DIAGNOSIS — G47.00 INSOMNIA, UNSPECIFIED TYPE: Primary | ICD-10-CM

## 2022-11-02 RX ORDER — TRAZODONE HYDROCHLORIDE 50 MG/1
50 TABLET ORAL NIGHTLY
Qty: 30 TABLET | Refills: 1 | Status: SHIPPED | OUTPATIENT
Start: 2022-11-02 | End: 2022-11-23

## 2022-11-02 NOTE — TELEPHONE ENCOUNTER
From: Laila Bell  To: SONIDO Rangel  Sent: 11/2/2022 7:35 AM EDT  Subject: Sleep     I am not sleeping. It may be stress because of school and both of my parents have been diagnosed with cancer. It may be menopause because I’m having hot flashes. My next appointment is in December. Can you give me something to help me sleep until I can see you?  Thank you!

## 2022-11-20 DIAGNOSIS — E11.65 TYPE 2 DIABETES MELLITUS WITH HYPERGLYCEMIA, WITHOUT LONG-TERM CURRENT USE OF INSULIN: ICD-10-CM

## 2022-11-21 RX ORDER — SEMAGLUTIDE 1.34 MG/ML
INJECTION, SOLUTION SUBCUTANEOUS
Qty: 3 ML | Refills: 0 | Status: SHIPPED | OUTPATIENT
Start: 2022-11-21 | End: 2022-12-19

## 2022-11-23 DIAGNOSIS — G47.00 INSOMNIA, UNSPECIFIED TYPE: ICD-10-CM

## 2022-11-23 RX ORDER — TRAZODONE HYDROCHLORIDE 100 MG/1
100 TABLET ORAL NIGHTLY
Qty: 90 TABLET | Refills: 1 | Status: SHIPPED | OUTPATIENT
Start: 2022-11-23

## 2022-11-23 NOTE — TELEPHONE ENCOUNTER
----- Message from Laila Bell sent at 11/23/2022  3:47 PM EST -----  Regarding: Sleep   Contact: [phone]  I'm running out of the Trazodone since I had to double up on it. I tried to refill it, but insurance says it's too early and won't refill until Dec. 25.  Would you please call in a new prescription?  Thank you!

## 2022-12-18 DIAGNOSIS — E11.65 TYPE 2 DIABETES MELLITUS WITH HYPERGLYCEMIA, WITHOUT LONG-TERM CURRENT USE OF INSULIN: ICD-10-CM

## 2022-12-19 RX ORDER — SEMAGLUTIDE 1.34 MG/ML
INJECTION, SOLUTION SUBCUTANEOUS
Qty: 3 ML | Refills: 0 | Status: SHIPPED | OUTPATIENT
Start: 2022-12-19 | End: 2023-01-13

## 2022-12-20 ENCOUNTER — OFFICE VISIT (OUTPATIENT)
Dept: NEUROLOGY | Facility: CLINIC | Age: 52
End: 2022-12-20

## 2022-12-20 ENCOUNTER — LAB (OUTPATIENT)
Dept: LAB | Facility: HOSPITAL | Age: 52
End: 2022-12-20

## 2022-12-20 VITALS
HEIGHT: 62 IN | HEART RATE: 89 BPM | SYSTOLIC BLOOD PRESSURE: 112 MMHG | WEIGHT: 157.8 LBS | DIASTOLIC BLOOD PRESSURE: 72 MMHG | BODY MASS INDEX: 29.04 KG/M2

## 2022-12-20 DIAGNOSIS — Z11.59 NEED FOR HEPATITIS C SCREENING TEST: ICD-10-CM

## 2022-12-20 DIAGNOSIS — E78.2 MIXED HYPERLIPIDEMIA: ICD-10-CM

## 2022-12-20 DIAGNOSIS — E11.65 TYPE 2 DIABETES MELLITUS WITH HYPERGLYCEMIA, WITHOUT LONG-TERM CURRENT USE OF INSULIN: ICD-10-CM

## 2022-12-20 DIAGNOSIS — G43.019 INTRACTABLE MIGRAINE WITHOUT AURA AND WITHOUT STATUS MIGRAINOSUS: Primary | ICD-10-CM

## 2022-12-20 LAB
ALBUMIN SERPL-MCNC: 4.4 G/DL (ref 3.5–5.2)
ALBUMIN UR-MCNC: 2.2 MG/DL
ALBUMIN/GLOB SERPL: 2 G/DL
ALP SERPL-CCNC: 103 U/L (ref 39–117)
ALT SERPL W P-5'-P-CCNC: 19 U/L (ref 1–33)
ANION GAP SERPL CALCULATED.3IONS-SCNC: 8 MMOL/L (ref 5–15)
AST SERPL-CCNC: 21 U/L (ref 1–32)
BACTERIA UR QL AUTO: ABNORMAL /HPF
BASOPHILS # BLD AUTO: 0.02 10*3/MM3 (ref 0–0.2)
BASOPHILS NFR BLD AUTO: 0.3 % (ref 0–1.5)
BILIRUB SERPL-MCNC: 0.9 MG/DL (ref 0–1.2)
BILIRUB UR QL STRIP: NEGATIVE
BUN SERPL-MCNC: 10 MG/DL (ref 6–20)
BUN/CREAT SERPL: 12.5 (ref 7–25)
CALCIUM SPEC-SCNC: 9.3 MG/DL (ref 8.6–10.5)
CHLORIDE SERPL-SCNC: 105 MMOL/L (ref 98–107)
CHOLEST SERPL-MCNC: 112 MG/DL (ref 0–200)
CLARITY UR: ABNORMAL
CO2 SERPL-SCNC: 28 MMOL/L (ref 22–29)
COD CRY URNS QL: ABNORMAL /HPF
COLOR UR: YELLOW
CREAT SERPL-MCNC: 0.8 MG/DL (ref 0.57–1)
DEPRECATED RDW RBC AUTO: 40.2 FL (ref 37–54)
EGFRCR SERPLBLD CKD-EPI 2021: 89.3 ML/MIN/1.73
EOSINOPHIL # BLD AUTO: 0.24 10*3/MM3 (ref 0–0.4)
EOSINOPHIL NFR BLD AUTO: 3.8 % (ref 0.3–6.2)
ERYTHROCYTE [DISTWIDTH] IN BLOOD BY AUTOMATED COUNT: 11.9 % (ref 12.3–15.4)
GLOBULIN UR ELPH-MCNC: 2.2 GM/DL
GLUCOSE SERPL-MCNC: 129 MG/DL (ref 65–99)
GLUCOSE UR STRIP-MCNC: NEGATIVE MG/DL
HBA1C MFR BLD: 6 % (ref 4.8–5.6)
HCT VFR BLD AUTO: 39.5 % (ref 34–46.6)
HCV AB SER DONR QL: NORMAL
HDLC SERPL-MCNC: 34 MG/DL (ref 40–60)
HGB BLD-MCNC: 13.6 G/DL (ref 12–15.9)
HGB UR QL STRIP.AUTO: NEGATIVE
HYALINE CASTS UR QL AUTO: ABNORMAL /LPF
IMM GRANULOCYTES # BLD AUTO: 0.02 10*3/MM3 (ref 0–0.05)
IMM GRANULOCYTES NFR BLD AUTO: 0.3 % (ref 0–0.5)
KETONES UR QL STRIP: ABNORMAL
LDLC SERPL CALC-MCNC: 52 MG/DL (ref 0–100)
LDLC/HDLC SERPL: 1.42 {RATIO}
LEUKOCYTE ESTERASE UR QL STRIP.AUTO: ABNORMAL
LYMPHOCYTES # BLD AUTO: 1.07 10*3/MM3 (ref 0.7–3.1)
LYMPHOCYTES NFR BLD AUTO: 16.8 % (ref 19.6–45.3)
MCH RBC QN AUTO: 31.6 PG (ref 26.6–33)
MCHC RBC AUTO-ENTMCNC: 34.4 G/DL (ref 31.5–35.7)
MCV RBC AUTO: 91.6 FL (ref 79–97)
MONOCYTES # BLD AUTO: 0.75 10*3/MM3 (ref 0.1–0.9)
MONOCYTES NFR BLD AUTO: 11.8 % (ref 5–12)
NEUTROPHILS NFR BLD AUTO: 4.28 10*3/MM3 (ref 1.7–7)
NEUTROPHILS NFR BLD AUTO: 67 % (ref 42.7–76)
NITRITE UR QL STRIP: NEGATIVE
NRBC BLD AUTO-RTO: 0 /100 WBC (ref 0–0.2)
PH UR STRIP.AUTO: 6 [PH] (ref 5–8)
PLATELET # BLD AUTO: 200 10*3/MM3 (ref 140–450)
PMV BLD AUTO: 9.5 FL (ref 6–12)
POTASSIUM SERPL-SCNC: 3.8 MMOL/L (ref 3.5–5.2)
PROT SERPL-MCNC: 6.6 G/DL (ref 6–8.5)
PROT UR QL STRIP: ABNORMAL
RBC # BLD AUTO: 4.31 10*6/MM3 (ref 3.77–5.28)
RBC # UR STRIP: ABNORMAL /HPF
REF LAB TEST METHOD: ABNORMAL
SODIUM SERPL-SCNC: 141 MMOL/L (ref 136–145)
SP GR UR STRIP: 1.03 (ref 1–1.03)
SQUAMOUS #/AREA URNS HPF: ABNORMAL /HPF
TRIGL SERPL-MCNC: 148 MG/DL (ref 0–150)
UROBILINOGEN UR QL STRIP: ABNORMAL
VLDLC SERPL-MCNC: 26 MG/DL (ref 5–40)
WBC # UR STRIP: ABNORMAL /HPF
WBC NRBC COR # BLD: 6.38 10*3/MM3 (ref 3.4–10.8)

## 2022-12-20 PROCEDURE — 83036 HEMOGLOBIN GLYCOSYLATED A1C: CPT

## 2022-12-20 PROCEDURE — 85025 COMPLETE CBC W/AUTO DIFF WBC: CPT

## 2022-12-20 PROCEDURE — 86803 HEPATITIS C AB TEST: CPT

## 2022-12-20 PROCEDURE — 36415 COLL VENOUS BLD VENIPUNCTURE: CPT

## 2022-12-20 PROCEDURE — 80053 COMPREHEN METABOLIC PANEL: CPT

## 2022-12-20 PROCEDURE — 87086 URINE CULTURE/COLONY COUNT: CPT

## 2022-12-20 PROCEDURE — 99213 OFFICE O/P EST LOW 20 MIN: CPT | Performed by: NURSE PRACTITIONER

## 2022-12-20 PROCEDURE — 80061 LIPID PANEL: CPT

## 2022-12-20 PROCEDURE — 82043 UR ALBUMIN QUANTITATIVE: CPT

## 2022-12-20 PROCEDURE — 81001 URINALYSIS AUTO W/SCOPE: CPT

## 2022-12-20 RX ORDER — SUMATRIPTAN 100 MG/1
100 TABLET, FILM COATED ORAL AS NEEDED
Qty: 12 TABLET | Refills: 5 | Status: SHIPPED | OUTPATIENT
Start: 2022-12-20

## 2022-12-20 RX ORDER — GALCANEZUMAB 120 MG/ML
120 INJECTION, SOLUTION SUBCUTANEOUS
Qty: 3 EACH | Refills: 1 | Status: SHIPPED | OUTPATIENT
Start: 2022-12-20 | End: 2023-03-31 | Stop reason: SDUPTHER

## 2022-12-20 RX ORDER — AMOXICILLIN AND CLAVULANATE POTASSIUM 875; 125 MG/1; MG/1
1 TABLET, FILM COATED ORAL 2 TIMES DAILY
COMMUNITY
Start: 2022-12-12 | End: 2023-03-08

## 2022-12-20 NOTE — PROGRESS NOTES
"Chief Complaint  Migraine    Subjective          Laila Gardenia Bell presents to [facility]  History of Present Illness  States she continues to do well on Emgality for preventative therapy of migraine.  Having about 6 headache days per month.  Uses Imitrex with good result for abortive therapy.  Denies side effects.  Did have an increase in headaches in August but October and November were better.  Has had an increase in December but has been sick and on antibiotics for upper respiratory infection.  Dealing with increased stress due to ill parents.     Interval History:  She reports that she developed headaches many years ago. Since that time, her headaches have progressively worsened.   Currently, she reports headaches that are located frontal and retro-orbitally. She characterizes the headaches as 8/10 in severity, throbbing in nature with associated photophobia and phonophobia. Her headaches last 8-10 hours. She reports 16+ headache days per month. She denies associated aura. She denies focal numbness, weakness, speech, and vision changes.   Triggers: Stress, Inadequate sleep, Weather, Lights, and odor   Symptoms improved by: Dark quiet room and Sleep   She states she is sleeping fairly well. Reports getting 6-7 hours of sleep per night. Denies snoring. Reports refreshing sleep.   Prior prophylactic medications include: Topamax, amitriptyline, Inderal, Depakote, aimovig, emgality   She uses abortive therapy such as: Imitrex, maxalt, ibuprofen, Excedrin, tylenol   Caffeine Use: minimal   Independently Reviewed: MRI brain and Prior PCP records   History of Kidney Stones: No   She denies a family history of cerebral aneurysm      Objective   Vital Signs:   /72   Pulse 89   Ht 157.5 cm (62.01\")   Wt 71.6 kg (157 lb 12.8 oz)   BMI 28.85 kg/m²     Physical Exam  HENT:      Head: Normocephalic.   Pulmonary:      Effort: Pulmonary effort is normal.   Neurological:      " Mental Status: She is alert and oriented to person, place, and time.      Cranial Nerves: Cranial nerves are intact.      Sensory: Sensation is intact.      Motor: Motor function is intact.      Coordination: Coordination is intact.      Deep Tendon Reflexes: Reflexes are normal and symmetric.        Neurologic Exam     Mental Status   Oriented to person, place, and time.        Result Review :               Assessment and Plan    Diagnoses and all orders for this visit:    1. Intractable migraine without aura and without status migrainosus (Primary)  Assessment & Plan:  Continue Emgality for preventative therapy of migraine and Imitrex as needed for abortive therapy. She will notify the office if headaches continue to worsen.       Other orders  -     galcanezumab-gnlm (Emgality) 120 MG/ML auto-injector pen; Inject 1 mL under the skin into the appropriate area as directed Every 30 (Thirty) Days.  Dispense: 3 each; Refill: 1  -     SUMAtriptan (Imitrex) 100 MG tablet; Take 1 tablet by mouth As Needed for Migraine.  Dispense: 12 tablet; Refill: 5      Follow Up   Return in about 6 months (around 6/20/2023) for Migraine f/u.  Patient was given instructions and counseling regarding her condition or for health maintenance advice. Please see specific information pulled into the AVS if appropriate.

## 2022-12-20 NOTE — ASSESSMENT & PLAN NOTE
Continue Emgality for preventative therapy of migraine and Imitrex as needed for abortive therapy. She will notify the office if headaches continue to worsen.

## 2022-12-21 ENCOUNTER — PRIOR AUTHORIZATION (OUTPATIENT)
Dept: NEUROLOGY | Facility: CLINIC | Age: 52
End: 2022-12-21
Payer: COMMERCIAL

## 2022-12-21 LAB — BACTERIA SPEC AEROBE CULT: NO GROWTH

## 2022-12-23 ENCOUNTER — TELEMEDICINE (OUTPATIENT)
Dept: FAMILY MEDICINE CLINIC | Facility: CLINIC | Age: 52
End: 2022-12-23

## 2022-12-23 DIAGNOSIS — E11.65 TYPE 2 DIABETES MELLITUS WITH HYPERGLYCEMIA, WITHOUT LONG-TERM CURRENT USE OF INSULIN: Primary | ICD-10-CM

## 2022-12-23 PROCEDURE — 99214 OFFICE O/P EST MOD 30 MIN: CPT | Performed by: NURSE PRACTITIONER

## 2022-12-23 RX ORDER — BLOOD-GLUCOSE SENSOR
1 EACH MISCELLANEOUS
Qty: 3 EACH | Refills: 5 | Status: SHIPPED | OUTPATIENT
Start: 2022-12-23

## 2022-12-23 NOTE — PROGRESS NOTES
Chief Complaint  Hyperlipidemia, Headache, and Covid-19 Home Monitoring Video Visit    Subjective        Laila Bell presents to [facility]  History of Present Illness  Some stresses.  Mom with breast cancer recent  notes that she has a dad that had recent diagnosis of liver cancer.    Diabetes:  Doing well on medication.    Hyperlipidemia:  Doing well on medication.    Diarrhea with covid.  States the last couple of days have felt bad but still drinking plenty.  Has been constipated.  Has had a low grade fever.  Yellow drainage.  Got sick Friday the 9th.  Was done with a flu test then.  Took Augmentin and didn't really help.  Started feeling better.  And Saturday started with    Annual exam:  Discussed diet and exercise.        Diabetes  She has type 2 diabetes mellitus. No MedicAlert identification noted. The initial diagnosis of diabetes was made 4 Years ago. Hypoglycemia symptoms include headaches. Pertinent negatives for hypoglycemia include no confusion, dizziness, hunger, mood changes, nervousness/anxiousness, pallor, seizures, sleepiness, speech difficulty, sweats or tremors. Associated symptoms include foot paresthesias, polydipsia and polyuria. Pertinent negatives for diabetes include no blurred vision, no chest pain, no fatigue, no foot ulcerations, no polyphagia, no visual change, no weakness and no weight loss. Pertinent negatives for hypoglycemia complications include no blackouts, no hospitalization, no nocturnal hypoglycemia, no required assistance and no required glucagon injection. Symptoms are stable. Pertinent negatives for diabetic complications include no CVA, heart disease, impotence, nephropathy, peripheral neuropathy, PVD or retinopathy. Risk factors for coronary artery disease include dyslipidemia, family history, hypertension, obesity and stress. Current diabetic treatment includes oral agent (monotherapy). She is compliant with treatment some of  the time. Her weight is stable. She is following a generally healthy diet. Meal planning includes avoidance of concentrated sweets and carbohydrate counting. She has not had a previous visit with a dietitian. She participates in exercise every other day. She monitors blood glucose at home 1-2 x per week. She monitors urine at home <1 x per month. Blood glucose monitoring compliance is inadequate. There is no change in her home blood glucose trend. Her breakfast blood glucose is taken after 10 am. Her breakfast blood glucose range is generally 110-130 mg/dl. Her lunch blood glucose is taken between 11-12 pm. Her lunch blood glucose range is generally 130-140 mg/dl. Her dinner blood glucose is taken between 5-6 pm. Her dinner blood glucose range is generally 110-130 mg/dl. Her highest blood glucose is 110-130 mg/dl. Her overall blood glucose range is 110-130 mg/dl. She does not see a podiatrist.Eye exam is current.   Headache  Hyperlipidemia  Pertinent negatives include no chest pain or shortness of breath.   Hypertension  Associated symptoms include headaches. Pertinent negatives include no blurred vision, chest pain, palpitations, shortness of breath or sweats. There is no history of CVA, PVD or retinopathy.   Sinusitis  Associated symptoms include headaches. Pertinent negatives include no coughing or shortness of breath.   Fatigue  Associated symptoms include headaches. Pertinent negatives include no chest pain, coughing, fatigue, fever, numbness, visual change or weakness.         Past History:    Medical History: has a past medical history of Abnormal ECG (08/16/2016), Abnormal Pap smear of cervix (2011), Acid reflux, Allergic rhinitis, Bursitis of left hip (04/06/2018), Colon polyps, Constipation, CTS (carpal tunnel syndrome) (1998), Diabetes mellitus, type 2 ([facility]) (05/08/2019), Diarrhea, DM2 (diabetes mellitus, type 2) ([facility]) (05/08/2019), Fatigue, Frequent headaches, Gastroesophageal reflux, GERD  (gastroesophageal reflux disease), Gestational diabetes, Headache, tension-type (1989), History of screening mammography (02/2019), HLD (hyperlipidemia), HPV in female (2013), Hyperlipidemia, Hypertension, Hypotension, IBS (irritable bowel syndrome), LGSIL on Pap smear of cervix (2012), LGSIL on Pap smear of cervix (2011), Menorrhagia, Migraine, Mild dysplasia of cervix (DOMINICK I), Mild dysplasia of cervix (DOMINICK I) (2011), Numbness and tingling, Pain of left hip joint (04/06/2018), Peripheral neuropathy, Screening mammogram, encounter for (02/2019), Sinus trouble, Tendinitis of left hip (04/06/2018), Urinary frequency, and Urinary urgency.     Surgical History: has a past surgical history that includes Colonoscopy (2020); Esophagogastroduodenoscopy (2020); Abdominal hysterectomy; Hysterectomy (2004); Tonsillectomy (1990); Cholecystectomy; Mammo stereotactic breast biopsy 1st w wo device (Right, 2007); Colposcopy (03/23/2011); Laparoscopic tubal ligation w/ Falope ring (2003); d & c hysteroscopy endometrial ablation; Colonoscopy (2015, 2020); Carpal tunnel release (1999); Tumor removal (2019); and Upper gastrointestinal endoscopy (2020).     Family History: family history includes Arthritis in her father and mother; Cirrhosis in her father; Colon cancer (age of onset: 40) in her paternal grandfather; Colon polyps in her paternal grandfather; Diabetes in her father; Heart disease in her father and paternal grandfather; Hypertension in her father; Kidney disease in her paternal grandfather; Liver cancer in her father and another family member; Liver disease in her father and mother; Lung cancer in an other family member; Migraines in her maternal aunt, maternal grandmother, mother, and paternal aunt; Neuropathy in her mother; Uterine cancer in her maternal grandmother.     Social History: reports that she has never smoked. She has never used smokeless tobacco. She reports that she does not drink alcohol and does not use  drugs.    Allergies: Dulaglutide          Current Outpatient Medications:   •  albuterol sulfate  (90 Base) MCG/ACT inhaler, Inhale 2 puffs Every 4 (Four) Hours As Needed for Wheezing., Disp: 18 g, Rfl: 0  •  atorvastatin (LIPITOR) 10 MG tablet, Take 1 tablet by mouth every night at bedtime., Disp: 90 tablet, Rfl: 1  •  benzonatate (TESSALON) 200 MG capsule, Take 1 capsule by mouth 3 (Three) Times a Day As Needed for Cough., Disp: 60 capsule, Rfl: 0  •  colestipol (Colestid) 1 g tablet, Take 1 tablet by mouth 2 (Two) Times a Day., Disp: 60 tablet, Rfl: 3  •  galcanezumab-gnlm (Emgality) 120 MG/ML auto-injector pen, Inject 1 mL under the skin into the appropriate area as directed Every 30 (Thirty) Days., Disp: 3 each, Rfl: 1  •  lansoprazole (PREVACID) 30 MG capsule, Take 1 capsule by mouth Daily., Disp: 30 capsule, Rfl: 5  •  metoprolol succinate XL (TOPROL-XL) 25 MG 24 hr tablet, Take 2 tablets by mouth Daily., Disp: 180 tablet, Rfl: 1  •  Ozempic, 1 MG/DOSE, 4 MG/3ML solution pen-injector, INJECT 1 MG UNDER THE SKIN INTO THE APPROPRIATE AREA ONCE PER WEEK, Disp: 3 mL, Rfl: 0  •  SUMAtriptan (Imitrex) 100 MG tablet, Take 1 tablet by mouth As Needed for Migraine., Disp: 12 tablet, Rfl: 5  •  traZODone (DESYREL) 100 MG tablet, Take 1 tablet by mouth Every Night., Disp: 90 tablet, Rfl: 1  •  amoxicillin-clavulanate (AUGMENTIN) 875-125 MG per tablet, Take 1 tablet by mouth 2 (Two) Times a Day., Disp: , Rfl:     There are no discontinued medications.      Review of Systems   Constitutional: Negative for fatigue, fever and unexpected weight loss.   Eyes: Negative for blurred vision.   Respiratory: Negative for cough and shortness of breath.    Cardiovascular: Negative for chest pain, palpitations and leg swelling.   Endocrine: Positive for polydipsia and polyuria. Negative for polyphagia.   Genitourinary: Negative for impotence.   Skin: Negative for pallor.   Neurological: Negative for dizziness, tremors,  seizures, speech difficulty, weakness, numbness, headache and confusion.   Psychiatric/Behavioral: The patient is not nervous/anxious.         Objective         There were no vitals filed for this visit.  There is no height or weight on file to calculate BMI.         Physical Exam  Constitutional:       Appearance: She is not ill-appearing.   HENT:      Head: Normocephalic.   Pulmonary:      Effort: Pulmonary effort is normal.   Neurological:      Mental Status: She is alert and oriented to person, place, and time.   Psychiatric:         Mood and Affect: Mood normal.         Behavior: Behavior normal.         Thought Content: Thought content normal.         Judgment: Judgment normal.             Result Review :               Assessment and Plan     There are no diagnoses linked to this encounter.          Follow Up     No follow-ups on file.    Patient was given instructions and counseling regarding her condition or for health maintenance advice. Please see specific information pulled into the AVS if appropriate.         Answers for HPI/ROS submitted by the patient on 12/21/2022  What is the primary reason for your visit?: Diabetes

## 2022-12-27 RX ORDER — LANSOPRAZOLE 30 MG/1
30 CAPSULE, DELAYED RELEASE ORAL DAILY
Qty: 90 CAPSULE | Refills: 0 | Status: SHIPPED | OUTPATIENT
Start: 2022-12-27 | End: 2023-04-05

## 2023-01-09 ENCOUNTER — TRANSCRIBE ORDERS (OUTPATIENT)
Dept: ADMINISTRATIVE | Facility: HOSPITAL | Age: 53
End: 2023-01-09
Payer: COMMERCIAL

## 2023-01-09 DIAGNOSIS — Z12.31 VISIT FOR SCREENING MAMMOGRAM: Primary | ICD-10-CM

## 2023-01-13 DIAGNOSIS — E11.65 TYPE 2 DIABETES MELLITUS WITH HYPERGLYCEMIA, WITHOUT LONG-TERM CURRENT USE OF INSULIN: ICD-10-CM

## 2023-01-13 RX ORDER — SEMAGLUTIDE 1.34 MG/ML
INJECTION, SOLUTION SUBCUTANEOUS
Qty: 3 ML | Refills: 0 | Status: SHIPPED | OUTPATIENT
Start: 2023-01-13 | End: 2023-02-01

## 2023-02-01 DIAGNOSIS — E11.65 TYPE 2 DIABETES MELLITUS WITH HYPERGLYCEMIA, WITHOUT LONG-TERM CURRENT USE OF INSULIN: ICD-10-CM

## 2023-02-01 RX ORDER — SEMAGLUTIDE 1.34 MG/ML
INJECTION, SOLUTION SUBCUTANEOUS
Qty: 3 ML | Refills: 0 | Status: SHIPPED | OUTPATIENT
Start: 2023-02-01 | End: 2023-03-08

## 2023-02-07 ENCOUNTER — PATIENT MESSAGE (OUTPATIENT)
Dept: FAMILY MEDICINE CLINIC | Facility: CLINIC | Age: 53
End: 2023-02-07
Payer: COMMERCIAL

## 2023-02-07 DIAGNOSIS — R30.0 DYSURIA: Primary | ICD-10-CM

## 2023-02-08 DIAGNOSIS — E11.65 TYPE 2 DIABETES MELLITUS WITH HYPERGLYCEMIA, WITHOUT LONG-TERM CURRENT USE OF INSULIN: ICD-10-CM

## 2023-02-08 DIAGNOSIS — E78.2 MIXED HYPERLIPIDEMIA: ICD-10-CM

## 2023-02-08 RX ORDER — ATORVASTATIN CALCIUM 10 MG/1
10 TABLET, FILM COATED ORAL
Qty: 90 TABLET | Refills: 1 | Status: SHIPPED | OUTPATIENT
Start: 2023-02-08

## 2023-02-08 RX ORDER — NITROFURANTOIN 25; 75 MG/1; MG/1
100 CAPSULE ORAL 2 TIMES DAILY
Qty: 14 CAPSULE | Refills: 0 | Status: SHIPPED | OUTPATIENT
Start: 2023-02-08 | End: 2023-03-08

## 2023-02-08 NOTE — TELEPHONE ENCOUNTER
From: Laila Bell  To: Mychal Calvillo  Sent: 2/7/2023 11:07 AM EST  Subject: UTI    I am having trouble with frequent urination and the constant urge to pee. I’m with my mother in ICU in [place]. Her chemo took a toll and she’s about to go to palliative care. Could you call me something in? Thank you.

## 2023-02-27 DIAGNOSIS — I10 HYPERTENSION, UNSPECIFIED TYPE: ICD-10-CM

## 2023-02-28 RX ORDER — METOPROLOL SUCCINATE 25 MG/1
50 TABLET, EXTENDED RELEASE ORAL DAILY
Qty: 180 TABLET | Refills: 1 | Status: SHIPPED | OUTPATIENT
Start: 2023-02-28

## 2023-03-06 ENCOUNTER — CLINICAL SUPPORT (OUTPATIENT)
Dept: GASTROENTEROLOGY | Facility: CLINIC | Age: 53
End: 2023-03-06
Payer: COMMERCIAL

## 2023-03-06 ENCOUNTER — PREP FOR SURGERY (OUTPATIENT)
Dept: OTHER | Facility: HOSPITAL | Age: 53
End: 2023-03-06
Payer: COMMERCIAL

## 2023-03-06 DIAGNOSIS — Z12.11 COLON CANCER SCREENING: Primary | ICD-10-CM

## 2023-03-06 DIAGNOSIS — Z80.0 FAMILY HISTORY OF COLON CANCER: ICD-10-CM

## 2023-03-06 RX ORDER — SODIUM, POTASSIUM,MAG SULFATES 17.5-3.13G
1 SOLUTION, RECONSTITUTED, ORAL ORAL EVERY 12 HOURS
Qty: 354 ML | Refills: 0 | Status: SHIPPED | OUTPATIENT
Start: 2023-03-06

## 2023-03-06 NOTE — PROGRESS NOTES
Laila Bell  1970  52 y.o.  Last colon 2020-KM poor prep quality    Reason for call: Screening Colonoscopy  Prep prescribed: Suprep  Prep instructions reviewed with patient and sent to patient via [facility]  Clearance needed? No  If yes, what clearance is needed? N/A  Clearance has been requested from N/A  The patient has been scheduled for: Colonoscopy  Family history of colon cancer? Yes  If yes, indicate relative: Paternal Grandfather  Family History   Problem Relation Age of Onset   • Arthritis Mother    • Liver disease Mother    • Migraines Mother    • Neuropathy Mother    • Hypertension Father    • Heart disease Father    • Diabetes Father    • Arthritis Father    • Liver disease Father    • Cirrhosis Father    • Liver cancer Father    • Uterine cancer Maternal Grandmother    • Migraines Maternal Grandmother    • Kidney disease Paternal Grandfather    • Heart disease Paternal Grandfather    • Colon cancer Paternal Grandfather 40   • Colon polyps Paternal Grandfather    • Migraines Maternal Aunt    • Migraines Paternal Aunt    • Liver cancer Other    • Lung cancer Other      Past Medical History:   Diagnosis Date   • Abnormal ECG 08/16/2016   • Abnormal Pap smear of cervix 2011   • Acid reflux    • Allergic rhinitis    • Bursitis of left hip 04/06/2018   • Colon polyps    • Constipation    • CTS (carpal tunnel syndrome) 1998   • Diabetes mellitus, type 2 (HCC) 05/08/2019   • Diarrhea    • DM2 (diabetes mellitus, type 2) (HCC) 05/08/2019   • Fatigue    • Frequent headaches    • Gastroesophageal reflux    • GERD (gastroesophageal reflux disease)    • Gestational diabetes    • Headache, tension-type 1989   • History of screening mammography 02/2019 2020 scheduled   • HLD (hyperlipidemia)    • HPV in female 2013   • Hyperlipidemia    • Hypertension    • Hypotension    • IBS (irritable bowel syndrome)    • LGSIL on Pap smear of cervix 2012   • LGSIL on Pap smear of cervix 2011   • Menorrhagia    •  Migraine    • Mild dysplasia of cervix (DOMINICK I)    • Mild dysplasia of cervix (DOMINICK I) 2011   • Numbness and tingling     in feet   • Pain of left hip joint 04/06/2018   • Peripheral neuropathy    • Screening mammogram, encounter for 02/2019 2020 SCHEDULED    • Sinus trouble    • Tendinitis of left hip 04/06/2018   • Urinary frequency    • Urinary urgency      Allergies   Allergen Reactions   • Dulaglutide Itching     Past Surgical History:   Procedure Laterality Date   • ABDOMINAL HYSTERECTOMY     • CARPAL TUNNEL RELEASE  1999   • CHOLECYSTECTOMY     • COLONOSCOPY  2020 2015 & 2020    • COLONOSCOPY  2015, 2020   • COLPOSCOPY  03/23/2011   • D & C HYSTEROSCOPY ENDOMETRIAL ABLATION     • ENDOSCOPY  2020   • HYSTERECTOMY  2004   • LAPAROSCOPIC TUBAL LIGATION W/ FALOPE RING  2003   • MAMMO STEREOTACTIC BREAST BIOPSY 1ST W WO DEVICE Right 2007   • TONSILLECTOMY  1990   • TUMOR REMOVAL  2019    transsphenoidal   • UPPER GASTROINTESTINAL ENDOSCOPY  2020     Social History     Socioeconomic History   • Marital status:    Tobacco Use   • Smoking status: Never     Passive exposure: Never   • Smokeless tobacco: Never   Vaping Use   • Vaping Use: Never used   Substance and Sexual Activity   • Alcohol use: Never   • Drug use: Never   • Sexual activity: Defer     Partners: Male     Birth control/protection: Surgical       Current Outpatient Medications:   •  albuterol sulfate  (90 Base) MCG/ACT inhaler, Inhale 2 puffs Every 4 (Four) Hours As Needed for Wheezing., Disp: 18 g, Rfl: 0  •  amoxicillin-clavulanate (AUGMENTIN) 875-125 MG per tablet, Take 1 tablet by mouth 2 (Two) Times a Day., Disp: , Rfl:   •  atorvastatin (LIPITOR) 10 MG tablet, Take 1 tablet by mouth every night at bedtime., Disp: 90 tablet, Rfl: 1  •  benzonatate (TESSALON) 200 MG capsule, Take 1 capsule by mouth 3 (Three) Times a Day As Needed for Cough., Disp: 60 capsule, Rfl: 0  •  colestipol (Colestid) 1 g tablet, Take 1 tablet by mouth 2  (Two) Times a Day., Disp: 60 tablet, Rfl: 3  •  Continuous Blood Gluc Sensor (FreeStyle Los 3 Sensor) misc, 1 each Every 10 (Ten) Days., Disp: 3 each, Rfl: 5  •  galcanezumab-gnlm (Emgality) 120 MG/ML auto-injector pen, Inject 1 mL under the skin into the appropriate area as directed Every 30 (Thirty) Days., Disp: 3 each, Rfl: 1  •  lansoprazole (PREVACID) 30 MG capsule, TAKE 1 CAPSULE BY MOUTH DAILY, Disp: 90 capsule, Rfl: 0  •  metoprolol succinate XL (TOPROL-XL) 25 MG 24 hr tablet, Take 2 tablets by mouth Daily., Disp: 180 tablet, Rfl: 1  •  nitrofurantoin, macrocrystal-monohydrate, (Macrobid) 100 MG capsule, Take 1 capsule by mouth 2 (Two) Times a Day., Disp: 14 capsule, Rfl: 0  •  Ozempic, 1 MG/DOSE, 4 MG/3ML solution pen-injector, INJECT 1 MG UNDER THE SKIN IN THE APPROPIATE AREA ONCE PER WEEK, Disp: 3 mL, Rfl: 0  •  SUMAtriptan (Imitrex) 100 MG tablet, Take 1 tablet by mouth As Needed for Migraine., Disp: 12 tablet, Rfl: 5  •  traZODone (DESYREL) 100 MG tablet, Take 1 tablet by mouth Every Night., Disp: 90 tablet, Rfl: 1  Answers for HPI/ROS submitted by the patient on 2/27/2023  Please describe your symptoms.: This appointment is to schedule colonoscopy for June.  Have you had these symptoms before?: Yes  How long have you been having these symptoms?: Greater than 2 weeks  Please list any medications you are currently taking for this condition.: Omeprazole, Bentyl, Colestipol  Please describe any probable cause for these symptoms. : IBS  What is the primary reason for your visit?: Other

## 2023-03-08 ENCOUNTER — PATIENT MESSAGE (OUTPATIENT)
Dept: FAMILY MEDICINE CLINIC | Facility: CLINIC | Age: 53
End: 2023-03-08
Payer: COMMERCIAL

## 2023-03-08 DIAGNOSIS — E11.65 TYPE 2 DIABETES MELLITUS WITH HYPERGLYCEMIA, WITHOUT LONG-TERM CURRENT USE OF INSULIN: ICD-10-CM

## 2023-03-08 NOTE — TELEPHONE ENCOUNTER
From: Laila Bell  To: Mychal Calvillo  Sent: 3/8/2023 11:08 AM EST  Subject: Low Blood Sugar    I am having low blood sugar during the night (54). During the day I am nauseated. I was wondering if my Ozempic needs to be adjusted or if I need to come in?  Thanks!

## 2023-03-30 ENCOUNTER — PRIOR AUTHORIZATION (OUTPATIENT)
Dept: NEUROLOGY | Facility: CLINIC | Age: 53
End: 2023-03-30
Payer: COMMERCIAL

## 2023-03-31 RX ORDER — GALCANEZUMAB 120 MG/ML
120 INJECTION, SOLUTION SUBCUTANEOUS
Qty: 3 EACH | Refills: 0 | Status: SHIPPED | OUTPATIENT
Start: 2023-03-31

## 2023-03-31 NOTE — TELEPHONE ENCOUNTER
Message from [employer]  Your PA request has been approved. Additional information will be provided in the approval communication. (Message [id number])  Approval scanned in

## 2023-04-05 RX ORDER — LANSOPRAZOLE 30 MG/1
30 CAPSULE, DELAYED RELEASE ORAL DAILY
Qty: 90 CAPSULE | Refills: 5 | Status: SHIPPED | OUTPATIENT
Start: 2023-04-05

## 2023-04-27 ENCOUNTER — TELEPHONE (OUTPATIENT)
Dept: FAMILY MEDICINE CLINIC | Facility: CLINIC | Age: 53
End: 2023-04-27
Payer: COMMERCIAL

## 2023-04-27 DIAGNOSIS — E11.65 TYPE 2 DIABETES MELLITUS WITH HYPERGLYCEMIA, WITHOUT LONG-TERM CURRENT USE OF INSULIN: ICD-10-CM

## 2023-04-27 RX ORDER — BLOOD-GLUCOSE SENSOR
1 EACH MISCELLANEOUS
Qty: 3 EACH | Refills: 5 | Status: SHIPPED | OUTPATIENT
Start: 2023-04-27

## 2023-04-27 NOTE — TELEPHONE ENCOUNTER
Yoana from [facility] is requesting to speak with clinical to clarify directions on [employer]. Yoana states that they are every 14 days directions were sent over for every 10 days.

## 2023-05-16 ENCOUNTER — TELEPHONE (OUTPATIENT)
Dept: GASTROENTEROLOGY | Facility: CLINIC | Age: 53
End: 2023-05-16
Payer: COMMERCIAL

## 2023-05-16 NOTE — TELEPHONE ENCOUNTER
I received a fax from patients pharmacy, patient is requesting a refill on dicyclomine. Patient has a follow up appointment scheduled 9/19/23 and a scope scheduled 6/13/23. Please advise?

## 2023-05-17 RX ORDER — DICYCLOMINE HCL 20 MG
20 TABLET ORAL EVERY 6 HOURS
Qty: 90 TABLET | Refills: 3 | Status: SHIPPED | OUTPATIENT
Start: 2023-05-17

## 2023-06-01 DIAGNOSIS — G47.00 INSOMNIA, UNSPECIFIED TYPE: ICD-10-CM

## 2023-06-02 RX ORDER — TRAZODONE HYDROCHLORIDE 100 MG/1
100 TABLET ORAL NIGHTLY
Qty: 90 TABLET | Refills: 1 | Status: SHIPPED | OUTPATIENT
Start: 2023-06-02

## 2023-06-06 ENCOUNTER — HOSPITAL ENCOUNTER (OUTPATIENT)
Dept: MAMMOGRAPHY | Facility: HOSPITAL | Age: 53
Discharge: HOME | End: 2023-06-06
Admitting: NURSE PRACTITIONER
Payer: COMMERCIAL

## 2023-06-06 ENCOUNTER — OFFICE VISIT (OUTPATIENT)
Dept: OBSTETRICS AND GYNECOLOGY | Facility: CLINIC | Age: 53
End: 2023-06-06
Payer: COMMERCIAL

## 2023-06-06 VITALS
HEIGHT: 62 IN | SYSTOLIC BLOOD PRESSURE: 126 MMHG | DIASTOLIC BLOOD PRESSURE: 81 MMHG | BODY MASS INDEX: 28.89 KG/M2 | WEIGHT: 157 LBS | HEART RATE: 90 BPM

## 2023-06-06 DIAGNOSIS — Z01.419 ENCOUNTER FOR GYNECOLOGICAL EXAMINATION WITHOUT ABNORMAL FINDING: Primary | ICD-10-CM

## 2023-06-06 DIAGNOSIS — Z12.31 VISIT FOR SCREENING MAMMOGRAM: ICD-10-CM

## 2023-06-06 PROCEDURE — 77067 SCR MAMMO BI INCL CAD: CPT

## 2023-06-06 PROCEDURE — 77063 BREAST TOMOSYNTHESIS BI: CPT

## 2023-06-06 NOTE — PROGRESS NOTES
"Well Woman Visit    CC: Annual well woman exam       HPI:   52 y.o. Contraception or HRT: using no HRT, s/p HYST, still has one or both ovaries, benign indications  Menses:  none  Pain:  None  Incontinence concerns: No  Hx of abnormal pap:  Yes  Pt has no complaints today. Mother recently passed away from inflammatory breast cancer after six months. States doing okay. Tearful.      History: PMHx, Meds, Allergies, PSHx, Social Hx, and POBHx all reviewed and updated.      PHYSICAL EXAM:  /81   Pulse 90   Ht 157.5 cm (62\")   Wt 71.2 kg (157 lb)   BMI 28.72 kg/m²   General- NAD, alert and oriented, appropriate  Psych- Normal mood, good memory  Neck- No masses, no thyroid enlargement  CV- Regular rhythm, no murnurs  Resp- CTA to bases, no wheezes  Abdomen- Soft, non distended, non tender, no masses    Breast left-  Bilaterally symmetrical, no masses, non tender, no nipple discharge  Breast right- Bilaterally symmetrical, no masses, non tender, no nipple discharge    External genitalia- Normal female, no lesions  Urethra/meatus- Normal, no masses, non tender, no prolapse  Bladder- Normal, no masses, non tender, no prolapse  Vagina- Normal, no atrophy, no lesions, no discharge, no prolapse  Cvx- Absent  Uterus- Absent  Adnexa- No mass, non tender  Anus/Rectum/Perineum- Not performed    Lymphatic- No palpable neck, axillary, or groin nodes  Ext- No edema, no cyanosis    Skin- No lesions, no rashes, no acanthosis nigricans        ASSESSMENT and PLAN:  WWE    Diagnoses and all orders for this visit:    1. Encounter for gynecological examination without abnormal finding (Primary)  -     IgP, Aptima HPV    Discussed guidelines for cervical cancer screening after hyst. Pt desires to continue on with paps.    Domestic violence/abuse screen: negative    Depression screen: no SI    Preventative:   BREAST HEALTH- Monthly self breast exam importance and how to reviewed. MMG and/or MRI (prn) reviewed per society " guidelines and her individual history. Mammo/MRI screen: Already up to date.  CERVICAL CANCER Screening- Reviewed current ASCCP guidelines for screening w and wo cotest HPV, age specific.  Screen: Updated today.  COLON CANCER Screening- Reviewed current medical society guidelines and options.  Colonoscopy screen:  Already up to date.  SEXUAL HEALTH: Declines STD screening.  VACCINATIONS Recommended: Flu annually, Zoster (49yo and older).  Importance discussed, risk being unvaccinated reviewed.  Questions answered  Smoking status- NON SMOKER.  Importance of avoiding second hand smoke.  Follow up PCP/Specialist PMHx and Labs  [facility]: Counseled and evaluated for hereditary cancer testing. Testing accepted. Patients information and Fhx will be sent to genetic counselor to review and discuss with patient options for testing if she qualifies.   She is to contact our office if she has not heard from the genetic counselor in the next 2 weeks.       She understands the importance of having any ordered tests to be performed in a timely fashion.  She is encouraged to review her results online and/or contact or office if she has questions.     Follow Up:  Return if symptoms worsen or fail to improve.      Halie العراقي, SONIDO  06/06/2023

## 2023-06-07 NOTE — PRE-PROCEDURE INSTRUCTIONS
"Instructed on arrival time and date of procedure.  Come to entrance \"C\" and will need a  over the age of 18 to drive home.  May have two visitors.  No children under the age of 12 allowed.  If over 12, must be accompanied by an adult.  Don't take meds the day of procedure but bring them or a list to the hospital.  Clear liquids the day before procedure with no red or purple liquids.  Discussed bowel prep.  Verbalized understanding of instructions given.  Phone number given if questions or concerns.  "

## 2023-06-09 LAB
CYTOLOGIST CVX/VAG CYTO: NORMAL
CYTOLOGY CVX/VAG DOC CYTO: NORMAL
CYTOLOGY CVX/VAG DOC THIN PREP: NORMAL
DX ICD CODE: NORMAL
HIV 1 & 2 AB SER-IMP: NORMAL
HPV I/H RISK 4 DNA CVX QL PROBE+SIG AMP: NEGATIVE
OTHER STN SPEC: NORMAL
STAT OF ADQ CVX/VAG CYTO-IMP: NORMAL

## 2023-06-12 RX ORDER — MONTELUKAST SODIUM 4 MG/1
1 TABLET, CHEWABLE ORAL 2 TIMES DAILY
Qty: 60 TABLET | Refills: 3 | Status: SHIPPED | OUTPATIENT
Start: 2023-06-12

## 2023-06-12 RX ORDER — MONTELUKAST SODIUM 4 MG/1
1 TABLET, CHEWABLE ORAL 2 TIMES DAILY
Qty: 60 TABLET | Refills: 3 | OUTPATIENT
Start: 2023-06-12

## 2023-06-13 ENCOUNTER — ANESTHESIA (OUTPATIENT)
Dept: GASTROENTEROLOGY | Facility: HOSPITAL | Age: 53
End: 2023-06-13
Payer: COMMERCIAL

## 2023-06-13 ENCOUNTER — ANESTHESIA EVENT (OUTPATIENT)
Dept: GASTROENTEROLOGY | Facility: HOSPITAL | Age: 53
End: 2023-06-13
Payer: COMMERCIAL

## 2023-06-13 ENCOUNTER — HOSPITAL ENCOUNTER (OUTPATIENT)
Facility: HOSPITAL | Age: 53
Setting detail: HOSPITAL OUTPATIENT SURGERY
Discharge: HOME | End: 2023-06-13
Attending: INTERNAL MEDICINE | Admitting: INTERNAL MEDICINE
Payer: COMMERCIAL

## 2023-06-13 VITALS
SYSTOLIC BLOOD PRESSURE: 119 MMHG | HEIGHT: 62 IN | OXYGEN SATURATION: 98 % | WEIGHT: 151.24 LBS | DIASTOLIC BLOOD PRESSURE: 73 MMHG | TEMPERATURE: 98.7 F | BODY MASS INDEX: 27.83 KG/M2 | HEART RATE: 80 BPM | RESPIRATION RATE: 15 BRPM

## 2023-06-13 DIAGNOSIS — Z80.0 FAMILY HISTORY OF COLON CANCER: ICD-10-CM

## 2023-06-13 DIAGNOSIS — Z12.11 COLON CANCER SCREENING: ICD-10-CM

## 2023-06-13 LAB — GLUCOSE BLDC GLUCOMTR-MCNC: 137 MG/DL (ref 70–99)

## 2023-06-13 PROCEDURE — 25010000002 PROPOFOL 10 MG/ML EMULSION: Performed by: NURSE ANESTHETIST, CERTIFIED REGISTERED

## 2023-06-13 PROCEDURE — 88305 TISSUE EXAM BY PATHOLOGIST: CPT | Performed by: INTERNAL MEDICINE

## 2023-06-13 PROCEDURE — 82948 REAGENT STRIP/BLOOD GLUCOSE: CPT

## 2023-06-13 RX ORDER — PROPOFOL 10 MG/ML
VIAL (ML) INTRAVENOUS AS NEEDED
Status: DISCONTINUED | OUTPATIENT
Start: 2023-06-13 | End: 2023-06-13 | Stop reason: SURG

## 2023-06-13 RX ORDER — LIDOCAINE HYDROCHLORIDE 20 MG/ML
INJECTION, SOLUTION EPIDURAL; INFILTRATION; INTRACAUDAL; PERINEURAL AS NEEDED
Status: DISCONTINUED | OUTPATIENT
Start: 2023-06-13 | End: 2023-06-13 | Stop reason: SURG

## 2023-06-13 RX ORDER — SODIUM CHLORIDE, SODIUM LACTATE, POTASSIUM CHLORIDE, CALCIUM CHLORIDE 600; 310; 30; 20 MG/100ML; MG/100ML; MG/100ML; MG/100ML
30 INJECTION, SOLUTION INTRAVENOUS CONTINUOUS
Status: DISCONTINUED | OUTPATIENT
Start: 2023-06-13 | End: 2023-06-13 | Stop reason: HOSPADM

## 2023-06-13 RX ADMIN — LIDOCAINE HYDROCHLORIDE 50 MG: 20 INJECTION, SOLUTION EPIDURAL; INFILTRATION; INTRACAUDAL; PERINEURAL at 07:41

## 2023-06-13 RX ADMIN — SODIUM CHLORIDE, POTASSIUM CHLORIDE, SODIUM LACTATE AND CALCIUM CHLORIDE 30 ML/HR: 600; 310; 30; 20 INJECTION, SOLUTION INTRAVENOUS at 07:38

## 2023-06-13 RX ADMIN — PROPOFOL 200 MCG/KG/MIN: 10 INJECTION, EMULSION INTRAVENOUS at 07:41

## 2023-06-13 RX ADMIN — PROPOFOL 100 MG: 10 INJECTION, EMULSION INTRAVENOUS at 07:41

## 2023-06-13 NOTE — H&P
ScreeningPre Procedure History & Physical    Chief Complaint:   Screening     Subjective     HPI:   Screening     Past Medical History:   Past Medical History:   Diagnosis Date    Abnormal ECG 08/16/2016    Abnormal Pap smear of cervix 2011    Acid reflux     Allergic rhinitis     Bursitis of left hip 04/06/2018    Colon polyps     Constipation     CTS (carpal tunnel syndrome) 1998    Diabetes mellitus, type 2 05/08/2019    Diarrhea     DM2 (diabetes mellitus, type 2) 05/08/2019    Fatigue     Frequent headaches     Gastroesophageal reflux     GERD (gastroesophageal reflux disease)     Gestational diabetes     Headache, tension-type 1989    History of screening mammography 02/2019 2020 scheduled    HLD (hyperlipidemia)     HPV in female 2013    Hyperlipidemia     Hypertension     Hypotension     IBS (irritable bowel syndrome)     LGSIL on Pap smear of cervix 2012    LGSIL on Pap smear of cervix 2011    Menorrhagia     Migraine     Mild dysplasia of cervix (DOMINICK I)     Mild dysplasia of cervix (DOMINICK I) 2011    Numbness and tingling     in feet    Pain of left hip joint 04/06/2018    Peripheral neuropathy     PONV (postoperative nausea and vomiting)     Screening mammogram, encounter for 02/2019 2020 SCHEDULED     Sinus trouble     Spinal headache     Tendinitis of left hip 04/06/2018    Urinary frequency     Urinary urgency        Past Surgical History:  Past Surgical History:   Procedure Laterality Date    ABDOMINAL HYSTERECTOMY      CARPAL TUNNEL RELEASE  1999    CHOLECYSTECTOMY      COLONOSCOPY  2020 2015 & 2020     COLONOSCOPY  2015, 2020    COLPOSCOPY  03/23/2011    D & C HYSTEROSCOPY ENDOMETRIAL ABLATION      ENDOSCOPY  2020    HYSTERECTOMY  2004    LAPAROSCOPIC TUBAL LIGATION W/ FALOPE RING  2003    MAMMO STEREOTACTIC BREAST BIOPSY 1ST W WO DEVICE Right 2007    TONSILLECTOMY  1990    TUMOR REMOVAL  2019    transsphenoidal    UPPER GASTROINTESTINAL ENDOSCOPY  2020       Family History:  Family History    Problem Relation Age of Onset    Hypertension Father     Heart disease Father     Diabetes Father     Arthritis Father     Liver disease Father     Cirrhosis Father     Liver cancer Father     Breast cancer Mother     Arthritis Mother     Liver disease Mother     Migraines Mother     Neuropathy Mother     Kidney disease Paternal Grandfather     Heart disease Paternal Grandfather     Colon cancer Paternal Grandfather 40    Colon polyps Paternal Grandfather     Uterine cancer Maternal Grandmother     Migraines Maternal Grandmother     Migraines Maternal Aunt     Migraines Paternal Aunt     Liver cancer Other     Lung cancer Other        Social History:   reports that she has never smoked. She has never been exposed to tobacco smoke. She has never used smokeless tobacco. She reports that she does not drink alcohol and does not use drugs.    Medications:   Medications Prior to Admission   Medication Sig Dispense Refill Last Dose    atorvastatin (LIPITOR) 10 MG tablet Take 1 tablet by mouth every night at bedtime. 90 tablet 1 Past Week    colestipol (Colestid) 1 g tablet Take 1 tablet by mouth 2 (Two) Times a Day. 60 tablet 3 Past Week    dicyclomine (BENTYL) 20 MG tablet Take 1 tablet by mouth Every 6 (Six) Hours. 90 tablet 3 Past Week    galcanezumab-gnlm (Emgality) 120 MG/ML auto-injector pen Inject 1 mL under the skin into the appropriate area as directed Every 30 (Thirty) Days. 3 each 0 Past Month    lansoprazole (PREVACID) 30 MG capsule TAKE 1 CAPSULE BY MOUTH DAILY 90 capsule 5 6/12/2023    metoprolol succinate XL (TOPROL-XL) 25 MG 24 hr tablet Take 2 tablets by mouth Daily. 180 tablet 1 Past Week    Semaglutide, 1 MG/DOSE, 4 MG/3ML solution pen-injector Inject 1 mg under the skin into the appropriate area as directed 1 (One) Time Per Week. 9 mL 1 Past Week    SUMAtriptan (Imitrex) 100 MG tablet Take 1 tablet by mouth As Needed for Migraine. 12 tablet 5 Past Week    traZODone (DESYREL) 100 MG tablet Take 1  "tablet by mouth Every Night. 90 tablet 1 6/12/2023    Continuous Blood Gluc Sensor (FreeStyle Los 3 Sensor) misc 1 each Every 14 (Fourteen) Days. 3 each 5        Allergies:  Dulaglutide        Objective     Blood pressure 129/80, pulse 83, temperature 97.3 °F (36.3 °C), temperature source Temporal, resp. rate 16, height 157.5 cm (62\"), weight 68.6 kg (151 lb 3.8 oz), SpO2 100 %.    Physical Exam   Constitutional: Pt is oriented to person, place, and time and well-developed, well-nourished, and in no distress.   Mouth/Throat: Oropharynx is clear and moist.   Neck: Normal range of motion.   Cardiovascular: Normal rate, regular rhythm and normal heart sounds.    Pulmonary/Chest: Effort normal and breath sounds normal.   Abdominal: Soft. Nontender  Skin: Skin is warm and dry.   Psychiatric: Mood, memory, affect and judgment normal.     Assessment & Plan     Diagnosis:  Screening colonoscopy  H/o colon polyps     Anticipated Surgical Procedure:  colonoscopy    The risks, benefits, and alternatives of this procedure have been discussed with the patient or the responsible party- the patient understands and agrees to proceed.            "

## 2023-06-13 NOTE — ANESTHESIA POSTPROCEDURE EVALUATION
Patient: Laila Bell    Procedure Summary       Date: 06/13/23 Room / Location: [facility] ENDOSCOPY 2 / [facility] ENDOSCOPY    Anesthesia Start: 0738 Anesthesia Stop: 0811    Procedure: COLONOSCOPY WITH COLD SNARE POLYPECTOMIES Diagnosis:       Colon cancer screening      Family history of colon cancer      (Colon cancer screening [Z12.11])      (Family history of colon cancer [Z80.0])    Surgeons: Jose David Berry MD Provider: Reyes, Mirabelle, DO    Anesthesia Type: general ASA Status: 2            Anesthesia Type: general    Vitals  Vitals Value Taken Time   /73 06/13/23 0825   Temp 37.1 °C (98.7 °F) 06/13/23 0825   Pulse 80 06/13/23 0825   Resp 15 06/13/23 0825   SpO2 98 % 06/13/23 0825           Post Anesthesia Care and Evaluation    Patient location during evaluation: bedside  Patient participation: complete - patient participated  Level of consciousness: awake  Pain management: adequate    Airway patency: patent  PONV Status: none  Cardiovascular status: acceptable and stable  Respiratory status: acceptable  Hydration status: acceptable    Comments: An Anesthesiologist personally participated in the most demanding procedures (including induction and emergence if applicable) in the anesthesia plan, monitored the course of anesthesia administration at frequent intervals and remained physically present and available for immediate diagnosis and treatment of emergencies.

## 2023-06-13 NOTE — ANESTHESIA PREPROCEDURE EVALUATION
Anesthesia Evaluation     Patient summary reviewed and Nursing notes reviewed   history of anesthetic complications:   NPO Solid Status: > 8 hours  NPO Liquid Status: > 2 hours           Airway   Mallampati: II  TM distance: >3 FB  Neck ROM: full  No difficulty expected  Dental - normal exam     Pulmonary - negative pulmonary ROS and normal exam    breath sounds clear to auscultation  Cardiovascular - normal exam  Exercise tolerance: good (4-7 METS)    Patient on routine beta blocker and Beta blocker not taken-may be given intraoperatively  Rhythm: regular  Rate: normal    (+) hypertension well controlled less than 2 medicationshyperlipidemia      Neuro/Psych  (+) headaches, numbness    ROS Comment: Hx benign pituitary tumor s/p resection 2019  GI/Hepatic/Renal/Endo    (+) GERD, diabetes mellitus type 2    Musculoskeletal (-) negative ROS    Abdominal    Substance History - negative use     OB/GYN negative ob/gyn ROS         Other - negative ROS       ROS/Med Hx Other: PAT Nursing Notes unavailable.                   Anesthesia Plan    ASA 2     general     (Patient understands anesthesia not responsible for dental damage.)  intravenous induction     Anesthetic plan, risks, benefits, and alternatives have been provided, discussed and informed consent has been obtained with: patient.  Pre-procedure education provided  Use of blood products discussed with patient  Consented to blood products.    Plan discussed with CRNA.      CODE STATUS:

## 2023-06-14 LAB
CYTO UR: NORMAL
LAB AP CASE REPORT: NORMAL
LAB AP CLINICAL INFORMATION: NORMAL
PATH REPORT.FINAL DX SPEC: NORMAL
PATH REPORT.GROSS SPEC: NORMAL

## 2023-07-24 ENCOUNTER — TELEPHONE (OUTPATIENT)
Dept: FAMILY MEDICINE CLINIC | Facility: CLINIC | Age: 53
End: 2023-07-24
Payer: COMMERCIAL

## 2023-07-24 ENCOUNTER — PATIENT MESSAGE (OUTPATIENT)
Dept: FAMILY MEDICINE CLINIC | Facility: CLINIC | Age: 53
End: 2023-07-24
Payer: COMMERCIAL

## 2023-07-24 DIAGNOSIS — F41.8 SITUATIONAL ANXIETY: Primary | ICD-10-CM

## 2023-07-24 NOTE — TELEPHONE ENCOUNTER
----- Message from Laila Bell sent at 7/24/2023 10:21 AM EDT -----  Regarding: Upcoming MRI  Contact: [phone]  Could you please call me in a Valium or 2 to get me through the mri Friday? I use [employer].  Thank you!

## 2023-07-25 NOTE — TELEPHONE ENCOUNTER
Placed order for a drug screen and will need a narcotic consent.  Has she taken valium in the past or will an ativan do as well.

## 2023-07-26 NOTE — TELEPHONE ENCOUNTER
From: Laila Bell  To: Mychal Calvillo  Sent: 7/24/2023 10:21 AM EDT  Subject: Upcoming MRI    Could you please call me in a Valium or 2 to get me through the mri Friday? I use [employer].  Thank you!

## 2023-08-14 DIAGNOSIS — E78.2 MIXED HYPERLIPIDEMIA: ICD-10-CM

## 2023-08-14 DIAGNOSIS — E11.65 TYPE 2 DIABETES MELLITUS WITH HYPERGLYCEMIA, WITHOUT LONG-TERM CURRENT USE OF INSULIN: ICD-10-CM

## 2023-08-15 RX ORDER — ATORVASTATIN CALCIUM 10 MG/1
10 TABLET, FILM COATED ORAL
Qty: 90 TABLET | Refills: 1 | Status: SHIPPED | OUTPATIENT
Start: 2023-08-15

## 2023-08-29 DIAGNOSIS — G47.00 INSOMNIA, UNSPECIFIED TYPE: ICD-10-CM

## 2023-08-29 RX ORDER — TRAZODONE HYDROCHLORIDE 100 MG/1
100 TABLET ORAL NIGHTLY
Qty: 90 TABLET | Refills: 1 | Status: SHIPPED | OUTPATIENT
Start: 2023-08-29

## 2023-09-06 RX ORDER — GALCANEZUMAB 120 MG/ML
INJECTION, SOLUTION SUBCUTANEOUS
Qty: 3 ML | OUTPATIENT
Start: 2023-09-06

## 2023-09-10 ENCOUNTER — PATIENT MESSAGE (OUTPATIENT)
Dept: FAMILY MEDICINE CLINIC | Facility: CLINIC | Age: 53
End: 2023-09-10
Payer: COMMERCIAL

## 2023-09-10 DIAGNOSIS — I10 HYPERTENSION, UNSPECIFIED TYPE: ICD-10-CM

## 2023-09-10 DIAGNOSIS — Z12.31 ENCOUNTER FOR SCREENING MAMMOGRAM FOR MALIGNANT NEOPLASM OF BREAST: Primary | ICD-10-CM

## 2023-09-11 RX ORDER — METOPROLOL SUCCINATE 25 MG/1
50 TABLET, EXTENDED RELEASE ORAL DAILY
Qty: 180 TABLET | Refills: 1 | Status: SHIPPED | OUTPATIENT
Start: 2023-09-11

## 2023-09-11 NOTE — TELEPHONE ENCOUNTER
From: Laila Bell  To: Mychal Calvillo  Sent: 9/10/2023 5:25 PM EDT  Subject: Mammogram     I’m supposed to remind you this month to put in my mammogram order so I can get it scheduled for June of 2024.  Thank you.

## 2023-09-17 DIAGNOSIS — E11.65 TYPE 2 DIABETES MELLITUS WITH HYPERGLYCEMIA, WITHOUT LONG-TERM CURRENT USE OF INSULIN: ICD-10-CM

## 2023-09-18 RX ORDER — SEMAGLUTIDE 1.34 MG/ML
INJECTION, SOLUTION SUBCUTANEOUS
Qty: 15 ML | Refills: 0 | Status: SHIPPED | OUTPATIENT
Start: 2023-09-18 | End: 2023-09-25 | Stop reason: SDUPTHER

## 2023-09-25 DIAGNOSIS — E11.65 TYPE 2 DIABETES MELLITUS WITH HYPERGLYCEMIA, WITHOUT LONG-TERM CURRENT USE OF INSULIN: ICD-10-CM

## 2023-09-25 RX ORDER — SEMAGLUTIDE 1.34 MG/ML
1 INJECTION, SOLUTION SUBCUTANEOUS WEEKLY
Qty: 3 ML | Refills: 0 | COMMUNITY
Start: 2023-09-25

## 2023-10-03 DIAGNOSIS — E11.65 TYPE 2 DIABETES MELLITUS WITH HYPERGLYCEMIA, WITHOUT LONG-TERM CURRENT USE OF INSULIN: ICD-10-CM

## 2023-10-03 RX ORDER — SEMAGLUTIDE 1.34 MG/ML
INJECTION, SOLUTION SUBCUTANEOUS
Qty: 3 ML | Refills: 0 | Status: SHIPPED | OUTPATIENT
Start: 2023-10-03

## 2023-10-09 ENCOUNTER — HOSPITAL ENCOUNTER (OUTPATIENT)
Dept: MRI IMAGING | Facility: HOSPITAL | Age: 53
Discharge: HOME | End: 2023-10-09
Payer: COMMERCIAL

## 2023-10-10 ENCOUNTER — TELEPHONE (OUTPATIENT)
Dept: FAMILY MEDICINE CLINIC | Facility: CLINIC | Age: 53
End: 2023-10-10
Payer: COMMERCIAL

## 2023-10-10 NOTE — TELEPHONE ENCOUNTER
Phoned  the patient to discuss her MRI breast and what Mychal has advised I placed both since mom with breast and she had a reading that even though normal her tissue is dense and could obscure small masses.  If she is fine with doing yearly you can cancel.  I left a message for the patient to call back.

## 2023-10-10 NOTE — TELEPHONE ENCOUNTER
The patient p[honed back and she stated that she was going to have the MRI but it was going to cost her 150.00 so she cancelled, she states she called back got it rescheduled and over the weekend the cost went up to 675 dollars so the patient cancelled. I gave the patient the number to [facility] to see what the cost would be because she really wants this test done because her moms mammograms came back normal and then she was diagnosed with breast cancer. She will call us back to let us know what she decides.

## 2023-12-07 ENCOUNTER — LAB (OUTPATIENT)
Dept: LAB | Facility: HOSPITAL | Age: 53
End: 2023-12-07
Payer: COMMERCIAL

## 2023-12-07 DIAGNOSIS — E11.65 TYPE 2 DIABETES MELLITUS WITH HYPERGLYCEMIA, WITHOUT LONG-TERM CURRENT USE OF INSULIN: ICD-10-CM

## 2023-12-07 LAB
ALBUMIN SERPL-MCNC: 4.3 G/DL (ref 3.5–5.2)
ALBUMIN UR-MCNC: 1.5 MG/DL
ALBUMIN/GLOB SERPL: 2 G/DL
ALP SERPL-CCNC: 107 U/L (ref 39–117)
ALT SERPL W P-5'-P-CCNC: 13 U/L (ref 1–33)
ANION GAP SERPL CALCULATED.3IONS-SCNC: 9.1 MMOL/L (ref 5–15)
AST SERPL-CCNC: 15 U/L (ref 1–32)
BACTERIA UR QL AUTO: ABNORMAL /HPF
BASOPHILS # BLD AUTO: 0.04 10*3/MM3 (ref 0–0.2)
BASOPHILS NFR BLD AUTO: 0.8 % (ref 0–1.5)
BILIRUB SERPL-MCNC: 0.8 MG/DL (ref 0–1.2)
BILIRUB UR QL STRIP: NEGATIVE
BUN SERPL-MCNC: 8 MG/DL (ref 6–20)
BUN/CREAT SERPL: 9.3 (ref 7–25)
CALCIUM SPEC-SCNC: 9.6 MG/DL (ref 8.6–10.5)
CHLORIDE SERPL-SCNC: 103 MMOL/L (ref 98–107)
CHOLEST SERPL-MCNC: 122 MG/DL (ref 0–200)
CLARITY UR: ABNORMAL
CO2 SERPL-SCNC: 25.9 MMOL/L (ref 22–29)
COLOR UR: YELLOW
CREAT SERPL-MCNC: 0.86 MG/DL (ref 0.57–1)
CREAT UR-MCNC: 273.1 MG/DL
DEPRECATED RDW RBC AUTO: 38.7 FL (ref 37–54)
EGFRCR SERPLBLD CKD-EPI 2021: 81.4 ML/MIN/1.73
EOSINOPHIL # BLD AUTO: 0.26 10*3/MM3 (ref 0–0.4)
EOSINOPHIL NFR BLD AUTO: 5.1 % (ref 0.3–6.2)
ERYTHROCYTE [DISTWIDTH] IN BLOOD BY AUTOMATED COUNT: 11.9 % (ref 12.3–15.4)
GLOBULIN UR ELPH-MCNC: 2.2 GM/DL
GLUCOSE SERPL-MCNC: 146 MG/DL (ref 65–99)
GLUCOSE UR STRIP-MCNC: NEGATIVE MG/DL
HBA1C MFR BLD: 5.7 % (ref 4.8–5.6)
HCT VFR BLD AUTO: 39.3 % (ref 34–46.6)
HDLC SERPL-MCNC: 33 MG/DL (ref 40–60)
HGB BLD-MCNC: 13.8 G/DL (ref 12–15.9)
HGB UR QL STRIP.AUTO: NEGATIVE
HOLD SPECIMEN: NORMAL
HYALINE CASTS UR QL AUTO: ABNORMAL /LPF
IMM GRANULOCYTES # BLD AUTO: 0.01 10*3/MM3 (ref 0–0.05)
IMM GRANULOCYTES NFR BLD AUTO: 0.2 % (ref 0–0.5)
KETONES UR QL STRIP: ABNORMAL
LDLC SERPL CALC-MCNC: 65 MG/DL (ref 0–100)
LDLC/HDLC SERPL: 1.87 {RATIO}
LEUKOCYTE ESTERASE UR QL STRIP.AUTO: ABNORMAL
LYMPHOCYTES # BLD AUTO: 1.32 10*3/MM3 (ref 0.7–3.1)
LYMPHOCYTES NFR BLD AUTO: 25.8 % (ref 19.6–45.3)
MCH RBC QN AUTO: 31.7 PG (ref 26.6–33)
MCHC RBC AUTO-ENTMCNC: 35.1 G/DL (ref 31.5–35.7)
MCV RBC AUTO: 90.1 FL (ref 79–97)
MICROALBUMIN/CREAT UR: 5.5 MG/G (ref 0–29)
MONOCYTES # BLD AUTO: 0.28 10*3/MM3 (ref 0.1–0.9)
MONOCYTES NFR BLD AUTO: 5.5 % (ref 5–12)
NEUTROPHILS NFR BLD AUTO: 3.2 10*3/MM3 (ref 1.7–7)
NEUTROPHILS NFR BLD AUTO: 62.6 % (ref 42.7–76)
NITRITE UR QL STRIP: NEGATIVE
NRBC BLD AUTO-RTO: 0 /100 WBC (ref 0–0.2)
PH UR STRIP.AUTO: 6 [PH] (ref 5–8)
PLATELET # BLD AUTO: 191 10*3/MM3 (ref 140–450)
PMV BLD AUTO: 9.7 FL (ref 6–12)
POTASSIUM SERPL-SCNC: 4 MMOL/L (ref 3.5–5.2)
PROT SERPL-MCNC: 6.5 G/DL (ref 6–8.5)
PROT UR QL STRIP: ABNORMAL
RBC # BLD AUTO: 4.36 10*6/MM3 (ref 3.77–5.28)
RBC # UR STRIP: ABNORMAL /HPF
REF LAB TEST METHOD: ABNORMAL
SODIUM SERPL-SCNC: 138 MMOL/L (ref 136–145)
SP GR UR STRIP: 1.02 (ref 1–1.03)
SQUAMOUS #/AREA URNS HPF: ABNORMAL /HPF
TRIGL SERPL-MCNC: 136 MG/DL (ref 0–150)
UROBILINOGEN UR QL STRIP: ABNORMAL
VLDLC SERPL-MCNC: 24 MG/DL (ref 5–40)
WBC # UR STRIP: ABNORMAL /HPF
WBC NRBC COR # BLD AUTO: 5.11 10*3/MM3 (ref 3.4–10.8)

## 2023-12-07 PROCEDURE — 80061 LIPID PANEL: CPT

## 2023-12-07 PROCEDURE — 82043 UR ALBUMIN QUANTITATIVE: CPT

## 2023-12-07 PROCEDURE — 82570 ASSAY OF URINE CREATININE: CPT

## 2023-12-07 PROCEDURE — 81001 URINALYSIS AUTO W/SCOPE: CPT

## 2023-12-07 PROCEDURE — 80053 COMPREHEN METABOLIC PANEL: CPT

## 2023-12-07 PROCEDURE — 36415 COLL VENOUS BLD VENIPUNCTURE: CPT

## 2023-12-07 PROCEDURE — 85025 COMPLETE CBC W/AUTO DIFF WBC: CPT

## 2023-12-07 PROCEDURE — 83036 HEMOGLOBIN GLYCOSYLATED A1C: CPT

## 2023-12-08 ENCOUNTER — PATIENT MESSAGE (OUTPATIENT)
Dept: FAMILY MEDICINE CLINIC | Facility: CLINIC | Age: 53
End: 2023-12-08
Payer: COMMERCIAL

## 2023-12-08 DIAGNOSIS — N39.0 URINARY TRACT INFECTION WITHOUT HEMATURIA, SITE UNSPECIFIED: Primary | ICD-10-CM

## 2023-12-08 RX ORDER — NITROFURANTOIN 25; 75 MG/1; MG/1
100 CAPSULE ORAL 2 TIMES DAILY
Qty: 14 CAPSULE | Refills: 0 | Status: SHIPPED | OUTPATIENT
Start: 2023-12-08

## 2023-12-20 ENCOUNTER — OFFICE VISIT (OUTPATIENT)
Dept: FAMILY MEDICINE CLINIC | Facility: CLINIC | Age: 53
End: 2023-12-20
Payer: COMMERCIAL

## 2023-12-20 VITALS
SYSTOLIC BLOOD PRESSURE: 126 MMHG | WEIGHT: 150.1 LBS | RESPIRATION RATE: 16 BRPM | DIASTOLIC BLOOD PRESSURE: 76 MMHG | TEMPERATURE: 96.2 F | HEART RATE: 91 BPM | HEIGHT: 62 IN | BODY MASS INDEX: 27.62 KG/M2 | OXYGEN SATURATION: 99 %

## 2023-12-20 DIAGNOSIS — Z00.00 ANNUAL PHYSICAL EXAM: Primary | ICD-10-CM

## 2023-12-20 DIAGNOSIS — K21.9 GASTROESOPHAGEAL REFLUX DISEASE WITHOUT ESOPHAGITIS: ICD-10-CM

## 2023-12-20 DIAGNOSIS — I10 PRIMARY HYPERTENSION: ICD-10-CM

## 2023-12-20 DIAGNOSIS — E78.2 MIXED HYPERLIPIDEMIA: ICD-10-CM

## 2023-12-20 DIAGNOSIS — E11.65 TYPE 2 DIABETES MELLITUS WITH HYPERGLYCEMIA, WITHOUT LONG-TERM CURRENT USE OF INSULIN: ICD-10-CM

## 2023-12-20 PROCEDURE — 99396 PREV VISIT EST AGE 40-64: CPT | Performed by: NURSE PRACTITIONER

## 2023-12-20 NOTE — PROGRESS NOTES
Answers submitted by the patient for this visit:  Primary Reason for Visit (Submitted on 12/18/2023)  What is the primary reason for your visit?: Physical  Chief Complaint  Hypertension, Headache, Hyperlipidemia, and Annual Exam    Subjective        Laila Bell presents to [facility]  History of Present Illness  Headache:  has been stress with loss of mom.    Diabetes:  Doing well on medication.    Hyperlipidemia:  Doing well on medication.            Hypertension  Associated symptoms include headaches. Pertinent negatives include no blurred vision, chest pain or sweats. There is no history of CVA, PVD or retinopathy.   Headache  Hyperlipidemia  Pertinent negatives include no chest pain.   Diabetes  Hypoglycemia symptoms include headaches. Pertinent negatives for hypoglycemia include no sweats. Associated symptoms include fatigue. Pertinent negatives for diabetes include no blurred vision and no chest pain. Pertinent negatives for diabetic complications include no CVA, PVD or retinopathy.   Sinusitis  Associated symptoms include headaches.   Fatigue  Associated symptoms include fatigue and headaches. Pertinent negatives include no chest pain.       The following portions of the patient's history were personally reviewed and updated as appropriate: allergies, current medications, past medical history, past surgical history, past family history, and past social history.     Body mass index is 27.45 kg/m².           Past History:    Medical History: has a past medical history of Abnormal ECG (08/16/2016), Abnormal Pap smear of cervix (2011), Acid reflux, Allergic rhinitis, Bursitis of left hip (04/06/2018), Cholelithiasis (2002), Colon polyps, Constipation, CTS (carpal tunnel syndrome) (1998), Diabetes mellitus, type 2 (05/08/2019), Diarrhea, DM2 (diabetes mellitus, type 2) (05/08/2019), Fatigue, Frequent headaches, Gastroesophageal reflux, GERD (gastroesophageal reflux  disease), Gestational diabetes, Headache, tension-type (1989), History of screening mammography (02/2019), HLD (hyperlipidemia), HPV in female (2013), Hyperlipidemia, Hypertension, Hypotension, IBS (irritable bowel syndrome), LGSIL on Pap smear of cervix (2012), LGSIL on Pap smear of cervix (2011), Menorrhagia, Migraine, Mild dysplasia of cervix (DOMINICK I), Mild dysplasia of cervix (DOMINICK I) (2011), Numbness and tingling, Pain of left hip joint (04/06/2018), Peripheral neuropathy, PONV (postoperative nausea and vomiting), Screening mammogram, encounter for (02/2019), Sinus trouble, Spinal headache, Tendinitis of left hip (04/06/2018), Urinary frequency, and Urinary urgency.     Surgical History: has a past surgical history that includes Colonoscopy (2020); Esophagogastroduodenoscopy (2020); Abdominal hysterectomy; Hysterectomy (2004); Tonsillectomy (1990); Cholecystectomy; Mammo stereotactic breast biopsy 1st w wo device (Right, 2007); Colposcopy (03/23/2011); Laparoscopic tubal ligation w/ Falope ring (2003); d & c hysteroscopy endometrial ablation; Colonoscopy (2015, 2020); Carpal tunnel release (1999); Tumor removal (2019); Upper gastrointestinal endoscopy (2020); and Colonoscopy (N/A, 6/13/2023).     Family History: family history includes Arthritis in her father and mother; Breast cancer in her mother; Cancer in her father and mother; Cirrhosis in her father; Colon cancer (age of onset: 40) in her paternal grandfather; Colon polyps in her paternal grandfather; Diabetes in her father; Heart disease in her father and paternal grandfather; Hypertension in her father; Kidney disease in her paternal grandfather; Liver cancer in her father and another family member; Liver disease in her father and mother; Lung cancer in an other family member; Migraines in her maternal aunt, maternal grandmother, mother, and paternal aunt; Neuropathy in her mother; Uterine cancer in her maternal grandmother.     Social History: reports  that she has never smoked. She has never been exposed to tobacco smoke. She has never used smokeless tobacco. She reports that she does not drink alcohol and does not use drugs.    Allergies: Dulaglutide          Current Outpatient Medications:     atorvastatin (LIPITOR) 10 MG tablet, Take 1 tablet by mouth every night at bedtime., Disp: 90 tablet, Rfl: 1    colestipol (Colestid) 1 g tablet, Take 1 tablet by mouth 2 (Two) Times a Day., Disp: 60 tablet, Rfl: 3    Continuous Blood Gluc Sensor (FreeStyle Los 3 Sensor) misc, 1 each Every 14 (Fourteen) Days., Disp: 3 each, Rfl: 5    dicyclomine (BENTYL) 20 MG tablet, Take 1 tablet by mouth Every 6 (Six) Hours., Disp: 90 tablet, Rfl: 3    galcanezumab-gnlm (Emgality) 120 MG/ML auto-injector pen, Inject 1 mL under the skin into the appropriate area as directed Every 30 (Thirty) Days., Disp: 3 each, Rfl: 3    lansoprazole (PREVACID) 30 MG capsule, TAKE 1 CAPSULE BY MOUTH DAILY, Disp: 90 capsule, Rfl: 5    metoprolol succinate XL (TOPROL-XL) 25 MG 24 hr tablet, Take 2 tablets by mouth Daily., Disp: 180 tablet, Rfl: 1    Ozempic, 1 MG/DOSE, 4 MG/3ML solution pen-injector, INJECT 1 MG UNDER THE SKIN IN THE APPROPRIATE AREA ONCE WEEKLY, Disp: 3 mL, Rfl: 0    SUMAtriptan (Imitrex) 100 MG tablet, Take 1 tablet by mouth As Needed for Migraine., Disp: 12 tablet, Rfl: 11    traZODone (DESYREL) 100 MG tablet, TAKE 1 TABLET BY MOUTH EVERY NIGHT, Disp: 90 tablet, Rfl: 1    nitrofurantoin, macrocrystal-monohydrate, (Macrobid) 100 MG capsule, Take 1 capsule by mouth 2 (Two) Times a Day. (Patient not taking: Reported on 12/20/2023), Disp: 14 capsule, Rfl: 0    There are no discontinued medications.      Review of Systems   Constitutional:  Positive for fatigue.   Eyes:  Negative for blurred vision.   Cardiovascular:  Negative for chest pain.        Objective         Vitals:    12/20/23 0909   BP: 126/76   BP Location: Right arm   Patient Position: Sitting   Cuff Size: Adult   Pulse: 91  "  Resp: 16   Temp: 96.2 °F (35.7 °C)   TempSrc: Temporal   SpO2: 99%   Weight: 68.1 kg (150 lb 1.6 oz)   Height: 157.5 cm (62.01\")     Body mass index is 27.45 kg/m².         Physical Exam  Vitals reviewed.   Constitutional:       Appearance: Normal appearance. She is well-developed.   HENT:      Head: Normocephalic and atraumatic.      Mouth/Throat:      Pharynx: No oropharyngeal exudate.   Eyes:      Conjunctiva/sclera: Conjunctivae normal.      Pupils: Pupils are equal, round, and reactive to light.   Cardiovascular:      Rate and Rhythm: Normal rate and regular rhythm.      Pulses:           Dorsalis pedis pulses are 2+ on the right side and 2+ on the left side.      Heart sounds: Normal heart sounds. No murmur heard.     No friction rub. No gallop.   Pulmonary:      Effort: Pulmonary effort is normal.      Breath sounds: Normal breath sounds. No wheezing or rhonchi.   Feet:      Right foot:      Protective Sensation: 10 sites tested.  10 sites sensed.      Skin integrity: Skin integrity normal. No ulcer or blister.      Toenail Condition: Right toenails are normal.      Left foot:      Protective Sensation: 10 sites tested.  10 sites sensed.      Skin integrity: Skin integrity normal. No ulcer or blister.      Toenail Condition: Left toenails are normal.      Comments:      Skin:     General: Skin is warm and dry.   Neurological:      Mental Status: She is alert and oriented to person, place, and time.   Psychiatric:         Mood and Affect: Mood and affect normal.         Behavior: Behavior normal.         Thought Content: Thought content normal.         Judgment: Judgment normal.             Result Review :               Assessment and Plan     Diagnoses and all orders for this visit:    1. Annual physical exam (Primary)  Comments:  discussed diet and exercise.    2. Mixed hyperlipidemia    3. Primary hypertension    4. Type 2 diabetes mellitus with hyperglycemia, without long-term current use of insulin  -   "   CBC & Differential; Future  -     Comprehensive Metabolic Panel; Future  -     Hemoglobin A1c; Future  -     Urinalysis With Culture If Indicated -; Future  -     Lipid Panel; Future  -     Microalbumin / Creatinine Urine Ratio - Urine, Clean Catch; Future    5. Gastroesophageal reflux disease without esophagitis              Follow Up     Return in about 6 months (around 6/20/2024).    Patient was given instructions and counseling regarding her condition or for health maintenance advice. Please see specific information pulled into the AVS if appropriate.

## 2024-01-03 ENCOUNTER — PATIENT MESSAGE (OUTPATIENT)
Dept: FAMILY MEDICINE CLINIC | Facility: CLINIC | Age: 54
End: 2024-01-03
Payer: COMMERCIAL

## 2024-01-03 DIAGNOSIS — E11.65 TYPE 2 DIABETES MELLITUS WITH HYPERGLYCEMIA, WITHOUT LONG-TERM CURRENT USE OF INSULIN: ICD-10-CM

## 2024-01-10 RX ORDER — SEMAGLUTIDE 1.34 MG/ML
1 INJECTION, SOLUTION SUBCUTANEOUS WEEKLY
Qty: 3 ML | Refills: 0 | Status: SHIPPED | OUTPATIENT
Start: 2024-01-10

## 2024-01-10 NOTE — TELEPHONE ENCOUNTER
From: Laila Bell  To: Mychal Calvillo  Sent: 1/3/2024 6:44 AM EST  Subject: Appointment     I keep getting messages that I have an appointment at the lab this morning. I’m sure it’s the bloodwork I will have in June?

## 2024-02-04 DIAGNOSIS — E11.65 TYPE 2 DIABETES MELLITUS WITH HYPERGLYCEMIA, WITHOUT LONG-TERM CURRENT USE OF INSULIN: ICD-10-CM

## 2024-02-05 RX ORDER — SEMAGLUTIDE 1.34 MG/ML
1 INJECTION, SOLUTION SUBCUTANEOUS WEEKLY
Qty: 3 ML | Refills: 0 | Status: SHIPPED | OUTPATIENT
Start: 2024-02-05

## 2024-02-21 DIAGNOSIS — E78.2 MIXED HYPERLIPIDEMIA: ICD-10-CM

## 2024-02-21 DIAGNOSIS — E11.65 TYPE 2 DIABETES MELLITUS WITH HYPERGLYCEMIA, WITHOUT LONG-TERM CURRENT USE OF INSULIN: ICD-10-CM

## 2024-02-21 RX ORDER — ATORVASTATIN CALCIUM 10 MG/1
10 TABLET, FILM COATED ORAL
Qty: 90 TABLET | Refills: 1 | Status: SHIPPED | OUTPATIENT
Start: 2024-02-21

## 2024-03-02 DIAGNOSIS — I10 HYPERTENSION, UNSPECIFIED TYPE: ICD-10-CM

## 2024-03-04 DIAGNOSIS — E11.65 TYPE 2 DIABETES MELLITUS WITH HYPERGLYCEMIA, WITHOUT LONG-TERM CURRENT USE OF INSULIN: ICD-10-CM

## 2024-03-04 RX ORDER — METOPROLOL SUCCINATE 25 MG/1
50 TABLET, EXTENDED RELEASE ORAL DAILY
Qty: 180 TABLET | Refills: 1 | Status: SHIPPED | OUTPATIENT
Start: 2024-03-04

## 2024-03-04 RX ORDER — SEMAGLUTIDE 1.34 MG/ML
1 INJECTION, SOLUTION SUBCUTANEOUS WEEKLY
Qty: 3 ML | Refills: 0 | Status: SHIPPED | OUTPATIENT
Start: 2024-03-04

## 2024-04-03 ENCOUNTER — PRIOR AUTHORIZATION (OUTPATIENT)
Dept: NEUROLOGY | Facility: CLINIC | Age: 54
End: 2024-04-03
Payer: COMMERCIAL

## 2024-04-03 ENCOUNTER — PATIENT MESSAGE (OUTPATIENT)
Dept: NEUROLOGY | Facility: CLINIC | Age: 54
End: 2024-04-03
Payer: COMMERCIAL

## 2024-04-03 NOTE — TELEPHONE ENCOUNTER
Your PA request has been approved. Additional information will be provided in the approval communication.   Patient notified via mychart.

## 2024-05-20 DIAGNOSIS — E11.65 TYPE 2 DIABETES MELLITUS WITH HYPERGLYCEMIA, WITHOUT LONG-TERM CURRENT USE OF INSULIN: ICD-10-CM

## 2024-05-20 DIAGNOSIS — E78.2 MIXED HYPERLIPIDEMIA: ICD-10-CM

## 2024-05-20 RX ORDER — ATORVASTATIN CALCIUM 10 MG/1
10 TABLET, FILM COATED ORAL
Qty: 90 TABLET | Refills: 1 | Status: SHIPPED | OUTPATIENT
Start: 2024-05-20

## 2024-05-28 DIAGNOSIS — E11.65 TYPE 2 DIABETES MELLITUS WITH HYPERGLYCEMIA, WITHOUT LONG-TERM CURRENT USE OF INSULIN: ICD-10-CM

## 2024-05-28 RX ORDER — SEMAGLUTIDE 1.34 MG/ML
1 INJECTION, SOLUTION SUBCUTANEOUS WEEKLY
Qty: 3 ML | Refills: 0 | Status: SHIPPED | OUTPATIENT
Start: 2024-05-28

## 2024-05-29 ENCOUNTER — PATIENT MESSAGE (OUTPATIENT)
Dept: NEUROLOGY | Facility: CLINIC | Age: 54
End: 2024-05-29
Payer: COMMERCIAL

## 2024-06-03 DIAGNOSIS — G47.00 INSOMNIA, UNSPECIFIED TYPE: ICD-10-CM

## 2024-06-03 RX ORDER — TRAZODONE HYDROCHLORIDE 100 MG/1
100 TABLET ORAL NIGHTLY
Qty: 90 TABLET | Refills: 1 | Status: SHIPPED | OUTPATIENT
Start: 2024-06-03

## 2024-06-10 ENCOUNTER — HOSPITAL ENCOUNTER (OUTPATIENT)
Dept: MAMMOGRAPHY | Facility: HOSPITAL | Age: 54
Discharge: HOME | End: 2024-06-10
Payer: COMMERCIAL

## 2024-06-10 DIAGNOSIS — Z12.31 ENCOUNTER FOR SCREENING MAMMOGRAM FOR MALIGNANT NEOPLASM OF BREAST: ICD-10-CM

## 2024-06-10 DIAGNOSIS — N63.10 MASS OF RIGHT BREAST, UNSPECIFIED QUADRANT: Primary | ICD-10-CM

## 2024-06-13 ENCOUNTER — HOSPITAL ENCOUNTER (OUTPATIENT)
Dept: ULTRASOUND IMAGING | Facility: HOSPITAL | Age: 54
Discharge: HOME | End: 2024-06-13
Payer: COMMERCIAL

## 2024-06-13 ENCOUNTER — TELEMEDICINE (OUTPATIENT)
Dept: NEUROLOGY | Facility: CLINIC | Age: 54
End: 2024-06-13
Payer: COMMERCIAL

## 2024-06-13 ENCOUNTER — HOSPITAL ENCOUNTER (OUTPATIENT)
Dept: MAMMOGRAPHY | Facility: HOSPITAL | Age: 54
Discharge: HOME | End: 2024-06-13
Payer: COMMERCIAL

## 2024-06-13 DIAGNOSIS — N63.10 MASS OF RIGHT BREAST, UNSPECIFIED QUADRANT: ICD-10-CM

## 2024-06-13 DIAGNOSIS — G43.019 INTRACTABLE MIGRAINE WITHOUT AURA AND WITHOUT STATUS MIGRAINOSUS: Primary | ICD-10-CM

## 2024-06-13 PROCEDURE — 99213 OFFICE O/P EST LOW 20 MIN: CPT | Performed by: NURSE PRACTITIONER

## 2024-06-13 PROCEDURE — 77066 DX MAMMO INCL CAD BI: CPT

## 2024-06-13 PROCEDURE — G0279 TOMOSYNTHESIS, MAMMO: HCPCS

## 2024-06-13 RX ORDER — GALCANEZUMAB 120 MG/ML
120 INJECTION, SOLUTION SUBCUTANEOUS
Qty: 3 EACH | Refills: 3 | Status: SHIPPED | OUTPATIENT
Start: 2024-06-13 | End: 2024-09-11

## 2024-06-13 RX ORDER — SUMATRIPTAN 100 MG/1
100 TABLET, FILM COATED ORAL AS NEEDED
Qty: 12 TABLET | Refills: 11 | Status: SHIPPED | OUTPATIENT
Start: 2024-06-13

## 2024-06-13 NOTE — PATIENT INSTRUCTIONS
Migraine Headache  A migraine headache is an intense pulsing or throbbing pain on one or both sides of the head. Migraine headaches may also cause other symptoms, such as nausea, vomiting, and sensitivity to light and noise. A migraine headache can last from 4 hours to 3 days. Talk with your health care provider about what things may bring on (trigger) your migraine headaches.  What are the causes?  The exact cause is not known. However, a migraine may be caused when nerves in the brain get irritated and release chemicals that cause blood vessels to become inflamed. This inflammation causes pain. Migraines may be triggered or caused by:  Smoking.  Medicines, such as:  Nitroglycerin, which is used to treat chest pain.  Birth control pills.  Estrogen.  Certain blood pressure medicines.  Foods or drinks that contain nitrates, glutamate, aspartame, MSG, or tyramine.  Certain foods or drinks, such as aged cheeses, chocolate, alcohol, or caffeine.  Doing physical activity that is very hard.  Other triggers may include:  Menstruation.  Pregnancy.  Hunger.  Stress.  Getting too much or too little sleep.  Weather changes.  Tiredness (fatigue).  What increases the risk?  The following factors may make you more likely to have migraine headaches:  Being between the ages of 25-55 years old.  Being female.  Having a family history of migraine headaches.  Being .  Having a mental health condition, such as depression or anxiety.  Being obese.  What are the signs or symptoms?  The main symptom of this condition is pulsing or throbbing pain. This pain may:  Happen in any area of the head, such as on one or both sides.  Make it hard to do daily activities.  Get worse with physical activity.  Get worse around bright lights, loud noises, or smells.  Other symptoms may include:  Nausea.  Vomiting.  Dizziness.  Before a migraine headache starts, you may get warning signs (an aura). An aura may include:  Seeing flashing lights or  having blind spots.  Seeing bright spots, halos, or zigzag lines.  Having tunnel vision or blurred vision.  Having numbness or a tingling feeling.  Having trouble talking.  Having muscle weakness.  After a migraine ends, you may have symptoms. These may include:  Feeling tired.  Trouble concentrating.  How is this diagnosed?  A migraine headache can be diagnosed based on:  Your symptoms.  A physical exam.  Tests, such as:  A CT scan or an MRI of the head. These tests can help rule out other causes of headaches.  Taking fluid from the spine (lumbar puncture) to examine it (cerebrospinal fluid analysis, or CSF analysis).  How is this treated?  This condition may be treated with medicines that:  Relieve pain and nausea.  Prevent migraines.  Treatment may also include:  Acupuncture.  Lifestyle changes like avoiding foods that trigger migraine headaches.  Learning ways to control your body (biofeedback).  Talk therapy to help you know and deal with negative thoughts (cognitive behavioral therapy).  Follow these instructions at home:  Medicines  Take over-the-counter and prescription medicines only as told by your provider.  Ask your provider if the medicine prescribed to you:  Requires you to avoid driving or using machinery.  Can cause constipation. You may need to take these actions to prevent or treat constipation:  Drink enough fluid to keep your pee (urine) pale yellow.  Take over-the-counter or prescription medicines.  Eat foods that are high in fiber, such as beans, whole grains, and fresh fruits and vegetables.  Limit foods that are high in fat and processed sugars, such as fried or sweet foods.  Lifestyle    Do not drink alcohol.  Do not use any products that contain nicotine or tobacco. These products include cigarettes, chewing tobacco, and vaping devices, such as e-cigarettes. If you need help quitting, ask your provider.  Get 7-9 hours of sleep each night, or the amount recommended by your provider.  Find  ways to manage stress, such as meditation, deep breathing, or yoga.  Try to exercise regularly. This can help lessen how bad and how often your migraines occur.  General instructions  Keep a journal to find out what triggers your migraines, so you can avoid those things. For example, write down:  What you eat and drink.  How much sleep you get.  Any change to your diet or medicines.  If you have a migraine headache:  Avoid things that make your symptoms worse, such as bright lights.  Lie down in a dark, quiet room.  Do not drive or use machinery.  Ask your provider what activities are safe for you while you have symptoms.  Keep all follow-up visits. Your provider will monitor your symptoms and recommend any further treatment.  Where to find more information  Coalition for Headache and Migraine Patients (CHAMP): headachemigraine.org  American Migraine Foundation: americanmigrainefoundation.org  National Headache Foundation: headaches.org  Contact a health care provider if:  You have symptoms that are different or worse than your usual migraine headache symptoms.  You have more than 15 days of headaches in one month.  Get help right away if:  Your migraine headache becomes severe or lasts more than 72 hours.  You have a fever or stiff neck.  You have vision loss.  Your muscles feel weak or like you cannot control them.  You lose your balance often or have trouble walking.  You faint.  You have a seizure.  This information is not intended to replace advice given to you by your health care provider. Make sure you discuss any questions you have with your health care provider.  Document Revised: 08/14/2023 Document Reviewed: 08/14/2023  Elsevier Patient Education © 2024 [employer]

## 2024-06-13 NOTE — PROGRESS NOTES
Chief Complaint  Migraine    Subjective          Laila Gardenia Bell presents to [facility]  History of Present Illness  States she continues to do well on Emgality for preventative therapy of migraine.  Having about 4 headache days per month.  Uses Imitrex with good result for abortive therapy.  Denies side effects.        Interval History:  She reports that she developed headaches many years ago. Since that time, her headaches have progressively worsened.   Currently, she reports headaches that are located frontal and retro-orbitally. She characterizes the headaches as 8/10 in severity, throbbing in nature with associated photophobia and phonophobia. Her headaches last 8-10 hours. She reports 16+ headache days per month. She denies associated aura. She denies focal numbness, weakness, speech, and vision changes.   Triggers: Stress, Inadequate sleep, Weather, Lights, and odor   Symptoms improved by: Dark quiet room and Sleep   She states she is sleeping fairly well. Reports getting 6-7 hours of sleep per night. Denies snoring. Reports refreshing sleep.   Prior prophylactic medications include: Topamax, amitriptyline, Inderal, Depakote, aimovig, emgality   She uses abortive therapy such as: Imitrex, maxalt, ibuprofen, Excedrin, tylenol   Caffeine Use: minimal   Independently Reviewed: MRI brain and Prior PCP records   History of Kidney Stones: No   She denies a family history of cerebral aneurysm      Objective   Vital Signs:   There were no vitals taken for this visit.    Physical Exam  HENT:      Head: Normocephalic.   Pulmonary:      Effort: Pulmonary effort is normal.   Neurological:      Mental Status: She is alert and oriented to person, place, and time.      Sensory: Sensation is intact.      Motor: Motor function is intact.      Coordination: Coordination is intact.      Deep Tendon Reflexes: Reflexes are normal and symmetric.        Neurologic Exam     Mental Status    Oriented to person, place, and time.        Result Review :               Assessment and Plan    Diagnoses and all orders for this visit:    1. Intractable migraine without aura and without status migrainosus (Primary)  Assessment & Plan:  Continue Emgality for preventative therapy of migraine and Imitrex as needed for abortive therapy.       Other orders  -     SUMAtriptan (Imitrex) 100 MG tablet; Take 1 tablet by mouth As Needed for Migraine.  Dispense: 12 tablet; Refill: 11  -     galcanezumab-gnlm (Emgality) 120 MG/ML auto-injector pen; Inject 1 mL under the skin into the appropriate area as directed Every 30 (Thirty) Days for 90 days.  Dispense: 3 each; Refill: 3        Follow Up   Return in about 1 year (around 6/13/2025) for Migraine f/u.  Patient was given instructions and counseling regarding her condition or for health maintenance advice. Please see specific information pulled into the AVS if appropriate.

## 2024-06-17 ENCOUNTER — LAB (OUTPATIENT)
Dept: LAB | Facility: HOSPITAL | Age: 54
End: 2024-06-17
Payer: COMMERCIAL

## 2024-06-17 DIAGNOSIS — E11.65 TYPE 2 DIABETES MELLITUS WITH HYPERGLYCEMIA, WITHOUT LONG-TERM CURRENT USE OF INSULIN: ICD-10-CM

## 2024-06-17 LAB
ALBUMIN SERPL-MCNC: 4.2 G/DL (ref 3.5–5.2)
ALBUMIN UR-MCNC: 1.9 MG/DL
ALBUMIN/GLOB SERPL: 2 G/DL
ALP SERPL-CCNC: 102 U/L (ref 39–117)
ALT SERPL W P-5'-P-CCNC: 15 U/L (ref 1–33)
ANION GAP SERPL CALCULATED.3IONS-SCNC: 8.9 MMOL/L (ref 5–15)
AST SERPL-CCNC: 11 U/L (ref 1–32)
BACTERIA UR QL AUTO: ABNORMAL /HPF
BASOPHILS # BLD AUTO: 0.03 10*3/MM3 (ref 0–0.2)
BASOPHILS NFR BLD AUTO: 0.6 % (ref 0–1.5)
BILIRUB SERPL-MCNC: 1.4 MG/DL (ref 0–1.2)
BILIRUB UR QL STRIP: NEGATIVE
BUN SERPL-MCNC: 9 MG/DL (ref 6–20)
BUN/CREAT SERPL: 11.3 (ref 7–25)
CALCIUM SPEC-SCNC: 9.1 MG/DL (ref 8.6–10.5)
CHLORIDE SERPL-SCNC: 105 MMOL/L (ref 98–107)
CHOLEST SERPL-MCNC: 120 MG/DL (ref 0–200)
CLARITY UR: ABNORMAL
CO2 SERPL-SCNC: 26.1 MMOL/L (ref 22–29)
COD CRY URNS QL: PRESENT /HPF
COLOR UR: ABNORMAL
CREAT SERPL-MCNC: 0.8 MG/DL (ref 0.57–1)
CREAT UR-MCNC: 406.6 MG/DL
DEPRECATED RDW RBC AUTO: 39.7 FL (ref 37–54)
EGFRCR SERPLBLD CKD-EPI 2021: 88.8 ML/MIN/1.73
EOSINOPHIL # BLD AUTO: 0.29 10*3/MM3 (ref 0–0.4)
EOSINOPHIL NFR BLD AUTO: 5.3 % (ref 0.3–6.2)
ERYTHROCYTE [DISTWIDTH] IN BLOOD BY AUTOMATED COUNT: 12.1 % (ref 12.3–15.4)
GLOBULIN UR ELPH-MCNC: 2.1 GM/DL
GLUCOSE SERPL-MCNC: 138 MG/DL (ref 65–99)
GLUCOSE UR STRIP-MCNC: NEGATIVE MG/DL
HBA1C MFR BLD: 6.2 % (ref 4.8–5.6)
HCT VFR BLD AUTO: 40 % (ref 34–46.6)
HDLC SERPL-MCNC: 34 MG/DL (ref 40–60)
HGB BLD-MCNC: 13.8 G/DL (ref 12–15.9)
HGB UR QL STRIP.AUTO: NEGATIVE
HOLD SPECIMEN: NORMAL
HYALINE CASTS UR QL AUTO: ABNORMAL /LPF
IMM GRANULOCYTES # BLD AUTO: 0.01 10*3/MM3 (ref 0–0.05)
IMM GRANULOCYTES NFR BLD AUTO: 0.2 % (ref 0–0.5)
KETONES UR QL STRIP: ABNORMAL
LDLC SERPL CALC-MCNC: 62 MG/DL (ref 0–100)
LDLC/HDLC SERPL: 1.75 {RATIO}
LEUKOCYTE ESTERASE UR QL STRIP.AUTO: ABNORMAL
LYMPHOCYTES # BLD AUTO: 1.45 10*3/MM3 (ref 0.7–3.1)
LYMPHOCYTES NFR BLD AUTO: 26.7 % (ref 19.6–45.3)
MCH RBC QN AUTO: 31.2 PG (ref 26.6–33)
MCHC RBC AUTO-ENTMCNC: 34.5 G/DL (ref 31.5–35.7)
MCV RBC AUTO: 90.5 FL (ref 79–97)
MICROALBUMIN/CREAT UR: 4.7 MG/G (ref 0–29)
MONOCYTES # BLD AUTO: 0.31 10*3/MM3 (ref 0.1–0.9)
MONOCYTES NFR BLD AUTO: 5.7 % (ref 5–12)
NEUTROPHILS NFR BLD AUTO: 3.35 10*3/MM3 (ref 1.7–7)
NEUTROPHILS NFR BLD AUTO: 61.5 % (ref 42.7–76)
NITRITE UR QL STRIP: NEGATIVE
NRBC BLD AUTO-RTO: 0 /100 WBC (ref 0–0.2)
PH UR STRIP.AUTO: 5.5 [PH] (ref 5–8)
PLATELET # BLD AUTO: 179 10*3/MM3 (ref 140–450)
PMV BLD AUTO: 9.6 FL (ref 6–12)
POTASSIUM SERPL-SCNC: 3.8 MMOL/L (ref 3.5–5.2)
PROT SERPL-MCNC: 6.3 G/DL (ref 6–8.5)
PROT UR QL STRIP: ABNORMAL
RBC # BLD AUTO: 4.42 10*6/MM3 (ref 3.77–5.28)
RBC # UR STRIP: ABNORMAL /HPF
REF LAB TEST METHOD: ABNORMAL
SODIUM SERPL-SCNC: 140 MMOL/L (ref 136–145)
SP GR UR STRIP: 1.03 (ref 1–1.03)
SQUAMOUS #/AREA URNS HPF: ABNORMAL /HPF
TRIGL SERPL-MCNC: 132 MG/DL (ref 0–150)
UROBILINOGEN UR QL STRIP: ABNORMAL
VLDLC SERPL-MCNC: 24 MG/DL (ref 5–40)
WBC # UR STRIP: ABNORMAL /HPF
WBC NRBC COR # BLD AUTO: 5.44 10*3/MM3 (ref 3.4–10.8)

## 2024-06-17 PROCEDURE — 82043 UR ALBUMIN QUANTITATIVE: CPT

## 2024-06-17 PROCEDURE — 36415 COLL VENOUS BLD VENIPUNCTURE: CPT

## 2024-06-17 PROCEDURE — 83036 HEMOGLOBIN GLYCOSYLATED A1C: CPT

## 2024-06-17 PROCEDURE — 80061 LIPID PANEL: CPT

## 2024-06-17 PROCEDURE — 82570 ASSAY OF URINE CREATININE: CPT

## 2024-06-17 PROCEDURE — 81001 URINALYSIS AUTO W/SCOPE: CPT

## 2024-06-17 PROCEDURE — 85025 COMPLETE CBC W/AUTO DIFF WBC: CPT

## 2024-06-17 PROCEDURE — 80053 COMPREHEN METABOLIC PANEL: CPT

## 2024-06-20 ENCOUNTER — OFFICE VISIT (OUTPATIENT)
Dept: FAMILY MEDICINE CLINIC | Facility: CLINIC | Age: 54
End: 2024-06-20
Payer: COMMERCIAL

## 2024-06-20 VITALS
TEMPERATURE: 97.2 F | SYSTOLIC BLOOD PRESSURE: 122 MMHG | RESPIRATION RATE: 16 BRPM | WEIGHT: 151.4 LBS | HEIGHT: 62 IN | OXYGEN SATURATION: 98 % | DIASTOLIC BLOOD PRESSURE: 74 MMHG | HEART RATE: 74 BPM | BODY MASS INDEX: 27.86 KG/M2

## 2024-06-20 DIAGNOSIS — E11.65 TYPE 2 DIABETES MELLITUS WITH HYPERGLYCEMIA, WITHOUT LONG-TERM CURRENT USE OF INSULIN: ICD-10-CM

## 2024-06-20 DIAGNOSIS — R30.0 DYSURIA: ICD-10-CM

## 2024-06-20 DIAGNOSIS — R92.30 DENSE BREASTS: ICD-10-CM

## 2024-06-20 DIAGNOSIS — F43.21 SITUATIONAL DEPRESSION: ICD-10-CM

## 2024-06-20 DIAGNOSIS — Z80.3 FAMILY HISTORY OF BREAST CANCER IN FIRST DEGREE RELATIVE: Primary | ICD-10-CM

## 2024-06-20 PROCEDURE — 99214 OFFICE O/P EST MOD 30 MIN: CPT | Performed by: NURSE PRACTITIONER

## 2024-06-20 RX ORDER — SERTRALINE HYDROCHLORIDE 25 MG/1
25 TABLET, FILM COATED ORAL DAILY
Qty: 30 TABLET | Refills: 1 | Status: SHIPPED | OUTPATIENT
Start: 2024-06-20

## 2024-06-20 RX ORDER — SULFAMETHOXAZOLE AND TRIMETHOPRIM 800; 160 MG/1; MG/1
1 TABLET ORAL 2 TIMES DAILY
Qty: 14 TABLET | Refills: 0 | Status: SHIPPED | OUTPATIENT
Start: 2024-06-20

## 2024-06-20 NOTE — PROGRESS NOTES
Answers submitted by the patient for this visit:  Primary Reason for Visit (Submitted on 6/13/2024)  What is the primary reason for your visit?: Diabetes  Diabetes Questionnaire (Submitted on 6/13/2024)  Chief Complaint: Diabetes problem  Diabetes type: type 2  MedicAlert ID: No  Disease duration: 8 Years  Treatment compliance: all of the time  Symptom course: stable  Home blood tests: 3-4 x per week  High score: 130-140  Below 70: occasionally  blurred vision: No  foot paresthesias: No  foot ulcerations: No  polydipsia: No  polyuria: No  weight loss: No  confusion: No  headaches: No  speech difficulty: No  sweats: No  tremors: Yes  Current diet: generally healthy  Meal planning: none  Exercise: daily  Eye exam current: Yes  Sees podiatrist: No  Chief Complaint  Hypertension, Headache, and Hyperlipidemia    Subjective        Laila Bell presents to [facility]  History of Present Illness  Headache:  has been stress with loss of mom.    Diabetes:  Doing well on medication.    Hyperlipidemia:  Doing well on medication.    Grief lost mom and dad close together in the last year.  Stress with the estate and losing parents quickly without preparation.            Hypertension  Associated symptoms include headaches. Pertinent negatives include no blurred vision, chest pain or sweats. There is no history of CVA, PVD or retinopathy.   Headache  Hyperlipidemia  Pertinent negatives include no chest pain.   Diabetes  She has type 2 diabetes mellitus. No MedicAlert identification noted. The initial diagnosis of diabetes was made 8 years ago. Hypoglycemia symptoms include headaches and tremors. Pertinent negatives for hypoglycemia include no confusion, speech difficulty or sweats. Associated symptoms include fatigue. Pertinent negatives for diabetes include no blurred vision, no chest pain, no foot paresthesias, no foot ulcerations, no polydipsia, no polyuria and no weight loss. Symptoms are  stable. Pertinent negatives for diabetic complications include no CVA, PVD or retinopathy. She is compliant with treatment all of the time. She is following a generally healthy diet. When asked about meal planning, she reported none. She participates in exercise daily. She monitors blood glucose at home 3-4 x per week. Her highest blood glucose is 130-140 mg/dl. She does not see a podiatrist. Eye exam is current.   Sinusitis  Associated symptoms include headaches.   Fatigue  Associated symptoms include fatigue and headaches. Pertinent negatives include no chest pain.       The following portions of the patient's history were personally reviewed and updated as appropriate: allergies, current medications, past medical history, past surgical history, past family history, and past social history.     Body mass index is 27.68 kg/m².           Past History:    Medical History: has a past medical history of Abnormal ECG (08/16/2016), Abnormal Pap smear of cervix (2011), Acid reflux, Allergic rhinitis, Bursitis of left hip (04/06/2018), Cholelithiasis (2002), Colon polyps, Constipation, CTS (carpal tunnel syndrome) (1998), Diabetes mellitus, type 2 (05/08/2019), Diarrhea, DM2 (diabetes mellitus, type 2) (05/08/2019), Fatigue, Frequent headaches, Gastroesophageal reflux, GERD (gastroesophageal reflux disease), Gestational diabetes, Headache, tension-type (1989), History of screening mammography (02/2019), HLD (hyperlipidemia), HPV in female (2013), Hyperlipidemia, Hypertension, Hypotension, IBS (irritable bowel syndrome), LGSIL on Pap smear of cervix (2012), LGSIL on Pap smear of cervix (2011), Menorrhagia, Migraine, Mild dysplasia of cervix (DOMINICK I), Mild dysplasia of cervix (DOMINICK I) (2011), Numbness and tingling, Pain of left hip joint (04/06/2018), Peripheral neuropathy, PONV (postoperative nausea and vomiting), Screening mammogram, encounter for (02/2019), Sinus trouble, Spinal headache, Tendinitis of left hip  (04/06/2018), Urinary frequency, and Urinary urgency.     Surgical History: has a past surgical history that includes Colonoscopy (2020); Esophagogastroduodenoscopy (2020); Abdominal hysterectomy; Hysterectomy (2004); Tonsillectomy (1990); Cholecystectomy; Mammo stereotactic breast biopsy 1st w wo device (Right, 2007); Colposcopy (03/23/2011); Laparoscopic tubal ligation w/ Falope ring (2003); d & c hysteroscopy endometrial ablation; Colonoscopy (2015, 2020); Carpal tunnel release (1999); Tumor removal (2019); Upper gastrointestinal endoscopy (2020); Colonoscopy (N/A, 06/13/2023); Brain surgery (2019); and Endometrial ablation (2003).     Family History: family history includes Arthritis in her father and mother; Breast cancer in her mother; Cancer in her father and mother; Cirrhosis in her father; Colon cancer (age of onset: 40) in her paternal grandfather; Colon polyps in her paternal grandfather; Diabetes in her father; Heart disease in her father and paternal grandfather; Hypertension in her father; Kidney disease in her paternal grandfather; Liver cancer in her father and another family member; Liver disease in her father and mother; Lung cancer in an other family member; Migraines in her maternal aunt, maternal grandmother, mother, and paternal aunt; Neuropathy in her mother; Uterine cancer in her maternal grandmother.     Social History: reports that she has never smoked. She has never been exposed to tobacco smoke. She has never used smokeless tobacco. She reports that she does not drink alcohol and does not use drugs.    Allergies: Dulaglutide          Current Outpatient Medications:     atorvastatin (LIPITOR) 10 MG tablet, TAKE 1 TABLET BY MOUTH EVERY NIGHT AT BEDTIME, Disp: 90 tablet, Rfl: 1    Continuous Blood Gluc Sensor (FreeStyle Los 3 Sensor) misc, 1 each Every 14 (Fourteen) Days., Disp: 3 each, Rfl: 5    galcanezumab-gnlm (Emgality) 120 MG/ML auto-injector pen, Inject 1 mL under the skin into the  "appropriate area as directed Every 30 (Thirty) Days for 90 days., Disp: 3 each, Rfl: 3    lansoprazole (PREVACID) 30 MG capsule, TAKE 1 CAPSULE BY MOUTH DAILY, Disp: 90 capsule, Rfl: 5    metoprolol succinate XL (TOPROL-XL) 25 MG 24 hr tablet, TAKE 2 TABLETS BY MOUTH DAILY, Disp: 180 tablet, Rfl: 1    Semaglutide, 1 MG/DOSE, (Ozempic, 1 MG/DOSE,) 4 MG/3ML solution pen-injector, Inject 1 mg under the skin into the appropriate area as directed 1 (One) Time Per Week., Disp: 3 mL, Rfl: 0    SUMAtriptan (Imitrex) 100 MG tablet, Take 1 tablet by mouth As Needed for Migraine., Disp: 12 tablet, Rfl: 11    traZODone (DESYREL) 100 MG tablet, Take 1 tablet by mouth Every Night., Disp: 90 tablet, Rfl: 1    dicyclomine (BENTYL) 20 MG tablet, Take 1 tablet by mouth Every 6 (Six) Hours. (Patient not taking: Reported on 6/20/2024), Disp: 90 tablet, Rfl: 3    sertraline (Zoloft) 25 MG tablet, Take 1 tablet by mouth Daily., Disp: 30 tablet, Rfl: 1    sulfamethoxazole-trimethoprim (Bactrim DS) 800-160 MG per tablet, Take 1 tablet by mouth 2 (Two) Times a Day., Disp: 14 tablet, Rfl: 0    Medications Discontinued During This Encounter   Medication Reason    colestipol (Colestid) 1 g tablet *Therapy completed         Review of Systems   Constitutional:  Positive for fatigue. Negative for unexpected weight loss.   Eyes:  Negative for blurred vision.   Cardiovascular:  Negative for chest pain.   Endocrine: Negative for polydipsia and polyuria.   Neurological:  Positive for tremors. Negative for speech difficulty and confusion.        Objective         Vitals:    06/20/24 0939   BP: 122/74   BP Location: Right arm   Patient Position: Sitting   Cuff Size: Adult   Pulse: 74   Resp: 16   Temp: 97.2 °F (36.2 °C)   TempSrc: Temporal   SpO2: 98%   Weight: 68.7 kg (151 lb 6.4 oz)   Height: 157.5 cm (62.01\")     Body mass index is 27.68 kg/m².         Physical Exam  Vitals reviewed.   Constitutional:       Appearance: Normal appearance. She is " well-developed.   HENT:      Head: Normocephalic and atraumatic.      Mouth/Throat:      Pharynx: No oropharyngeal exudate.   Eyes:      Conjunctiva/sclera: Conjunctivae normal.      Pupils: Pupils are equal, round, and reactive to light.   Cardiovascular:      Rate and Rhythm: Normal rate and regular rhythm.      Heart sounds: Normal heart sounds. No murmur heard.     No friction rub. No gallop.   Pulmonary:      Effort: Pulmonary effort is normal.      Breath sounds: Normal breath sounds. No wheezing or rhonchi.   Skin:     General: Skin is warm and dry.   Neurological:      Mental Status: She is alert and oriented to person, place, and time.   Psychiatric:         Mood and Affect: Mood and affect normal.         Behavior: Behavior normal.         Thought Content: Thought content normal.         Judgment: Judgment normal.             Result Review :               Assessment and Plan     Diagnoses and all orders for this visit:    1. Family history of breast cancer in first degree relative (Primary)  -     MRI Breast Bilateral Screening With & Without Contrast; Future    2. Dense breasts  -     MRI Breast Bilateral Screening With & Without Contrast; Future    3. Situational depression  -     sertraline (Zoloft) 25 MG tablet; Take 1 tablet by mouth Daily.  Dispense: 30 tablet; Refill: 1    4. Dysuria  -     sulfamethoxazole-trimethoprim (Bactrim DS) 800-160 MG per tablet; Take 1 tablet by mouth 2 (Two) Times a Day.  Dispense: 14 tablet; Refill: 0    5. Type 2 diabetes mellitus with hyperglycemia, without long-term current use of insulin  -     CBC & Differential; Future  -     Comprehensive Metabolic Panel; Future  -     Hemoglobin A1c; Future  -     Urinalysis With Culture If Indicated -; Future  -     Lipid Panel; Future  -     Microalbumin / Creatinine Urine Ratio - Urine, Clean Catch; Future              Follow Up     Return in about 1 month (around 7/20/2024).    Patient was given instructions and counseling  regarding her condition or for health maintenance advice. Please see specific information pulled into the AVS if appropriate.

## 2024-06-21 DIAGNOSIS — K21.9 GASTROESOPHAGEAL REFLUX DISEASE WITHOUT ESOPHAGITIS: Primary | ICD-10-CM

## 2024-06-21 RX ORDER — LANSOPRAZOLE 30 MG/1
30 CAPSULE, DELAYED RELEASE ORAL DAILY
Qty: 90 CAPSULE | Refills: 5 | Status: SHIPPED | OUTPATIENT
Start: 2024-06-21

## 2024-06-21 NOTE — TELEPHONE ENCOUNTER
Medication Requested lansoprazole (PREVACID) 30 MG capsule    Last Refill 3/25/2024    Last OV 9/23/2022    Next OV none scheduled    Medication pended for approval and correct pharmacy verified yes

## 2024-06-24 ENCOUNTER — PATIENT MESSAGE (OUTPATIENT)
Dept: FAMILY MEDICINE CLINIC | Facility: CLINIC | Age: 54
End: 2024-06-24
Payer: COMMERCIAL

## 2024-06-24 DIAGNOSIS — F41.8 SITUATIONAL ANXIETY: Primary | ICD-10-CM

## 2024-06-24 NOTE — TELEPHONE ENCOUNTER
From: Laila Bell  To: Mychal Calvillo  Sent: 6/24/2024 12:52 PM EDT  Subject: MRI    I have the breast MRI scheduled for next week. Would you please prescribe me something to relax me? I am claustrophobic and already feel the anxiety. I will have a . Thank you!

## 2024-06-25 DIAGNOSIS — E11.65 TYPE 2 DIABETES MELLITUS WITH HYPERGLYCEMIA, WITHOUT LONG-TERM CURRENT USE OF INSULIN: ICD-10-CM

## 2024-06-25 RX ORDER — SEMAGLUTIDE 1.34 MG/ML
INJECTION, SOLUTION SUBCUTANEOUS
Qty: 3 ML | Refills: 0 | Status: SHIPPED | OUTPATIENT
Start: 2024-06-25

## 2024-06-25 NOTE — TELEPHONE ENCOUNTER
UPCOMING APPTS  With Family Medicine (SONIDO Rangel)  07/24/2024 at 9:45 AM  LAST OFFICE VISIT - THIS DEPT  6/20/2024 Mychal Calvillo APRN

## 2024-06-27 ENCOUNTER — CLINICAL SUPPORT (OUTPATIENT)
Dept: FAMILY MEDICINE CLINIC | Facility: CLINIC | Age: 54
End: 2024-06-27
Payer: COMMERCIAL

## 2024-06-27 DIAGNOSIS — Z79.899 MEDICATION MANAGEMENT: Primary | ICD-10-CM

## 2024-06-27 LAB
AMPHET+METHAMPHET UR QL: NEGATIVE
AMPHETAMINE INTERNAL CONTROL: NORMAL
AMPHETAMINES UR QL: NEGATIVE
BARBITURATE INTERNAL CONTROL: NORMAL
BARBITURATES UR QL SCN: NEGATIVE
BENZODIAZ UR QL SCN: NEGATIVE
BENZODIAZEPINE INTERNAL CONTROL: NORMAL
BUPRENORPHINE INTERNAL CONTROL: NORMAL
BUPRENORPHINE SERPL-MCNC: NEGATIVE NG/ML
CANNABINOIDS SERPL QL: NEGATIVE
COCAINE INTERNAL CONTROL: NORMAL
COCAINE UR QL: NEGATIVE
EXPIRATION DATE: NORMAL
Lab: NORMAL
MDMA (ECSTASY) INTERNAL CONTROL: NORMAL
MDMA UR QL SCN: NEGATIVE
METHADONE INTERNAL CONTROL: NORMAL
METHADONE UR QL SCN: NEGATIVE
METHAMPHETAMINE INTERNAL CONTROL: NORMAL
MORPHINE INTERNAL CONTROL: NORMAL
MORPHINE/OPIATES SCREEN, URINE: NEGATIVE
OXYCODONE INTERNAL CONTROL: NORMAL
OXYCODONE UR QL SCN: NEGATIVE
PCP UR QL SCN: NEGATIVE
PHENCYCLIDINE INTERNAL CONTROL: NORMAL
THC INTERNAL CONTROL: NORMAL

## 2024-06-27 PROCEDURE — 80305 DRUG TEST PRSMV DIR OPT OBS: CPT | Performed by: NURSE PRACTITIONER

## 2024-06-27 RX ORDER — LORAZEPAM 0.5 MG/1
0.5 TABLET ORAL EVERY 8 HOURS PRN
Qty: 1 TABLET | Refills: 0 | Status: SHIPPED | OUTPATIENT
Start: 2024-06-27

## 2024-07-01 ENCOUNTER — OFFICE VISIT (OUTPATIENT)
Dept: OBSTETRICS AND GYNECOLOGY | Facility: CLINIC | Age: 54
End: 2024-07-01
Payer: COMMERCIAL

## 2024-07-01 VITALS
WEIGHT: 151 LBS | HEART RATE: 107 BPM | BODY MASS INDEX: 27.79 KG/M2 | HEIGHT: 62 IN | DIASTOLIC BLOOD PRESSURE: 70 MMHG | SYSTOLIC BLOOD PRESSURE: 104 MMHG

## 2024-07-01 DIAGNOSIS — Z01.419 ENCOUNTER FOR GYNECOLOGICAL EXAMINATION WITHOUT ABNORMAL FINDING: Primary | ICD-10-CM

## 2024-07-01 LAB
DEVELOPER EXPIRATION DATE: NORMAL
DEVELOPER LOT NUMBER: NORMAL
EXPIRATION DATE: NORMAL
FECAL OCCULT BLOOD SCREEN, POC: NEGATIVE
Lab: NORMAL
NEGATIVE CONTROL: NEGATIVE
POSITIVE CONTROL: POSITIVE

## 2024-07-01 PROCEDURE — 99396 PREV VISIT EST AGE 40-64: CPT | Performed by: OBSTETRICS & GYNECOLOGY

## 2024-07-01 PROCEDURE — 82274 ASSAY TEST FOR BLOOD FECAL: CPT | Performed by: OBSTETRICS & GYNECOLOGY

## 2024-07-01 NOTE — PROGRESS NOTES
"Well Woman Visit    CC: Annual well woman exam       HPI:   53 y.o. Contraception or HRT: s/p HYST, still has one or both ovaries, benign indications  Menses:  none  Pain:  None  Incontinence concerns: No  Hx of abnormal pap:  Yes,   Pt has no complaints today.      History: PMHx, Meds, Allergies, PSHx, Social Hx, and POBHx all reviewed and updated.      PHYSICAL EXAM:  /70   Pulse 107   Ht 157.5 cm (62.01\")   Wt 68.5 kg (151 lb)   BMI 27.61 kg/m²   General- NAD, alert and oriented, appropriate  Psych- Normal mood, good memory  Neck- No masses, no thyroid enlargement  CV- Regular rhythm, no murnurs  Resp- CTA to bases, no wheezes  Abdomen- Soft, non distended, non tender, no masses    Breast left-  Bilaterally symmetrical, no masses, non tender, no nipple discharge  Breast right- Bilaterally symmetrical, no masses, non tender, no nipple discharge    External genitalia- Normal female, no lesions  Urethra/meatus- Normal, no masses, non tender, no prolapse  Bladder- Normal, no masses, non tender, no prolapse  Vagina- Normal, no atrophy, no lesions, no discharge, no prolapse  Cvx- Absent  Uterus- Absent  Adnexa- No mass, non tender  Anus/Rectum/Perineum- Normal appearance, no mass, good sphincter tone, no hemorrhoids, no prolapse , Hemoccult neg    Lymphatic- No palpable neck, axillary, or groin nodes  Ext- No edema, no cyanosis    Skin- No lesions, no rashes, no acanthosis nigricans        ASSESSMENT and PLAN:  WWE    Diagnoses and all orders for this visit:    1. Encounter for gynecological examination without abnormal finding (Primary)  -     POC Occult Blood Stool    Discussed pap guidelines. Normal co-test last year. Pt declines pap today.    Domestic violence/abuse screen: negative    Depression screen: no SI    Preventative:   BREAST HEALTH- Monthly self breast exam importance and how to reviewed. MMG and/or MRI (prn) reviewed per society guidelines and her individual history. Mammo/MRI " screen: Already up to date.  CERVICAL CANCER Screening- Reviewed current ASCCP guidelines for screening w and wo cotest HPV, age specific.  Screen: Not medically needed.  COLON CANCER Screening- Reviewed current medical society guidelines and options.  Colonoscopy screen:  Already up to date.  SEXUAL HEALTH: Declines STD screening.  VACCINATIONS Recommended: Flu annually, Zoster (51yo and older).  Importance discussed, risk being unvaccinated reviewed.  Questions answered  Smoking status- NON SMOKER.  Importance of avoiding second hand smoke.  Follow up PCP/Specialist PMHx and Labs  Myriad: Counseled and evaluated for hereditary cancer testing. Testing declined.      She understands the importance of having any ordered tests to be performed in a timely fashion.  She is encouraged to review her results online and/or contact or office if she has questions.     Follow Up:  Return in about 1 year (around 7/1/2025) for Annual physical.      SONIDO Winn  07/01/2024

## 2024-07-02 ENCOUNTER — HOSPITAL ENCOUNTER (OUTPATIENT)
Dept: MRI IMAGING | Facility: HOSPITAL | Age: 54
Discharge: HOME | End: 2024-07-02
Admitting: NURSE PRACTITIONER
Payer: COMMERCIAL

## 2024-07-02 DIAGNOSIS — Z80.3 FAMILY HISTORY OF BREAST CANCER IN FIRST DEGREE RELATIVE: ICD-10-CM

## 2024-07-02 DIAGNOSIS — R92.30 DENSE BREASTS: ICD-10-CM

## 2024-07-02 PROCEDURE — 77049 MRI BREAST C-+ W/CAD BI: CPT

## 2024-07-02 PROCEDURE — A9577 INJ MULTIHANCE: HCPCS | Performed by: NURSE PRACTITIONER

## 2024-07-02 PROCEDURE — 0 GADOBENATE DIMEGLUMINE 529 MG/ML SOLUTION: Performed by: NURSE PRACTITIONER

## 2024-07-02 RX ADMIN — GADOBENATE DIMEGLUMINE 15 ML: 529 INJECTION, SOLUTION INTRAVENOUS at 12:28

## 2024-07-03 ENCOUNTER — PATIENT MESSAGE (OUTPATIENT)
Dept: FAMILY MEDICINE CLINIC | Facility: CLINIC | Age: 54
End: 2024-07-03
Payer: COMMERCIAL

## 2024-07-03 DIAGNOSIS — R92.8 ABNORMAL MRI, BREAST: Primary | ICD-10-CM

## 2024-07-03 NOTE — TELEPHONE ENCOUNTER
From: Laila Bell  To: Mychal Calvillo  Sent: 7/3/2024 12:19 PM EDT  Subject: MRI    The breat mri shows a mass and recommends an ultrasound. Would you please put in an order for me so I can get that scheduled?  Thank you.

## 2024-07-09 ENCOUNTER — HOSPITAL ENCOUNTER (OUTPATIENT)
Dept: ULTRASOUND IMAGING | Facility: HOSPITAL | Age: 54
Discharge: HOME | End: 2024-07-09
Admitting: NURSE PRACTITIONER
Payer: COMMERCIAL

## 2024-07-09 DIAGNOSIS — R92.8 ABNORMAL MRI, BREAST: ICD-10-CM

## 2024-07-09 PROCEDURE — 76642 ULTRASOUND BREAST LIMITED: CPT

## 2024-07-16 ENCOUNTER — PATIENT OUTREACH (OUTPATIENT)
Dept: ONCOLOGY | Facility: HOSPITAL | Age: 54
End: 2024-07-16
Payer: COMMERCIAL

## 2024-07-20 DIAGNOSIS — E11.65 TYPE 2 DIABETES MELLITUS WITH HYPERGLYCEMIA, WITHOUT LONG-TERM CURRENT USE OF INSULIN: ICD-10-CM

## 2024-07-22 RX ORDER — SEMAGLUTIDE 1.34 MG/ML
INJECTION, SOLUTION SUBCUTANEOUS
Qty: 3 ML | Refills: 0 | Status: SHIPPED | OUTPATIENT
Start: 2024-07-22

## 2024-07-24 ENCOUNTER — HOSPITAL ENCOUNTER (OUTPATIENT)
Dept: MAMMOGRAPHY | Facility: HOSPITAL | Age: 54
Discharge: HOME | End: 2024-07-24
Payer: COMMERCIAL

## 2024-07-24 ENCOUNTER — HOSPITAL ENCOUNTER (OUTPATIENT)
Dept: MRI IMAGING | Facility: HOSPITAL | Age: 54
Discharge: HOME | End: 2024-07-24
Payer: COMMERCIAL

## 2024-07-24 DIAGNOSIS — R92.8 ABNORMAL MRI, BREAST: ICD-10-CM

## 2024-07-24 PROCEDURE — A9577 INJ MULTIHANCE: HCPCS | Performed by: NURSE PRACTITIONER

## 2024-07-24 PROCEDURE — 0 GADOBENATE DIMEGLUMINE 529 MG/ML SOLUTION: Performed by: NURSE PRACTITIONER

## 2024-07-24 PROCEDURE — A4648 IMPLANTABLE TISSUE MARKER: HCPCS

## 2024-07-24 PROCEDURE — 25010000002 LIDOCAINE 1 % SOLUTION

## 2024-07-24 RX ORDER — LIDOCAINE HYDROCHLORIDE AND EPINEPHRINE 10; 10 MG/ML; UG/ML
INJECTION, SOLUTION INFILTRATION; PERINEURAL
Status: COMPLETED
Start: 2024-07-24 | End: 2024-07-24

## 2024-07-24 RX ORDER — DIAZEPAM 5 MG/1
5 TABLET ORAL ONCE
Status: COMPLETED | OUTPATIENT
Start: 2024-07-24 | End: 2024-07-24

## 2024-07-24 RX ORDER — DIAZEPAM 5 MG/1
TABLET ORAL
Status: DISCONTINUED
Start: 2024-07-24 | End: 2024-07-25 | Stop reason: HOSPADM

## 2024-07-24 RX ORDER — LIDOCAINE HYDROCHLORIDE 10 MG/ML
INJECTION, SOLUTION INFILTRATION; PERINEURAL
Status: COMPLETED
Start: 2024-07-24 | End: 2024-07-24

## 2024-07-24 RX ORDER — DIAPER,BRIEF,INFANT-TODD,DISP
EACH MISCELLANEOUS
Status: COMPLETED
Start: 2024-07-24 | End: 2024-07-24

## 2024-07-24 RX ADMIN — BACITRACIN: 500 OINTMENT TOPICAL at 14:12

## 2024-07-24 RX ADMIN — LIDOCAINE HYDROCHLORIDE: 10 INJECTION, SOLUTION INFILTRATION; PERINEURAL at 14:12

## 2024-07-24 RX ADMIN — DIAZEPAM 5 MG: 5 TABLET ORAL at 13:10

## 2024-07-24 RX ADMIN — GADOBENATE DIMEGLUMINE 12 ML: 529 INJECTION, SOLUTION INTRAVENOUS at 13:28

## 2024-07-24 RX ADMIN — LIDOCAINE HYDROCHLORIDE,EPINEPHRINE BITARTRATE: 10; .01 INJECTION, SOLUTION INFILTRATION; PERINEURAL at 14:12

## 2024-07-29 DIAGNOSIS — N60.99 ATYPICAL HYPERPLASIA OF BREAST: Primary | ICD-10-CM

## 2024-07-29 LAB
CYTO UR: NORMAL
LAB AP CASE REPORT: NORMAL
LAB AP CLINICAL INFORMATION: NORMAL
LAB AP SPECIAL STAINS: NORMAL
PATH REPORT.FINAL DX SPEC: NORMAL
PATH REPORT.GROSS SPEC: NORMAL

## 2024-08-21 ENCOUNTER — OFFICE VISIT (OUTPATIENT)
Dept: SURGERY | Facility: CLINIC | Age: 54
End: 2024-08-21
Payer: COMMERCIAL

## 2024-08-21 VITALS — WEIGHT: 149 LBS | HEIGHT: 62 IN | BODY MASS INDEX: 27.42 KG/M2 | RESPIRATION RATE: 18 BRPM

## 2024-08-21 DIAGNOSIS — N60.99 ATYPICAL DUCTAL HYPERPLASIA OF BREAST: Primary | ICD-10-CM

## 2024-08-21 PROCEDURE — 99203 OFFICE O/P NEW LOW 30 MIN: CPT | Performed by: SURGERY

## 2024-08-21 NOTE — H&P (VIEW-ONLY)
Chief Complaint: Abnormal Breast Imaging    Subjective         History of Present Illness  Laila Bell is a 53 y.o. female presents to [facility] to be seen for atypical ductal hyperplasia.  Her imaging and pathology are shown below;    Tissue Pathology Exam Final result 7/24/2024           Final Diagnosis   Left breast, central, MRI-guided core biopsy:               - Atypical ductal hyperplasia (ADH)               - Intraductal papilloma               - Usual ductal hyperplasia (UDH) with calcification  - Adenosis               Study Result    Narrative & Impression   MAMMO DIAGNOSTIC DIGITAL TOMOSYNTHESIS BILATERAL W CAD-     Date of Exam: 6/13/2024 12:20 PM     Indication: right breast lump patient found.; N63.10-Unspecified lump in  the right breast, unspecified quadrant.     Comparison: June 6, 2023, Chantel 3, 2022, June 2, 2021     Technique: Tomography was utilized.  Diagnostic images of the breast(s)  were obtained in the following views: MLO and cc views of both breasts  and true lateral view of both breasts     FINDINGS:  The breast density is heterogenously dense, which may obscure small  masses.     There are 2 biopsy clips on the right. There are scattered  calcifications within both breasts which seem stable. There are no  suspicious masses or areas of concerning architectural distortion.     These mammographic images were interpreted with the assistance of a  computer aided detection system.     RECOMMENDATION:  Screening Mammogram in One year.     BI-RADS ASSESSMENT: BI-RADS 2. Benign findings.        The patient's information is entered into a computerized reminder system  with a targeted due date for the next mammogram.     Note:  It has been reported that there is approximately a 15% false  negative in mammography.  Therefore, management of a palpable  abnormality should not be deferred because of a negative mammogram.                      Objective     Past  Medical History:   Diagnosis Date    Abnormal ECG 08/16/2016    Abnormal Pap smear of cervix 2011    Acid reflux     Allergic rhinitis     Breast mass July 2024    Bursitis of left hip 04/06/2018    Cholelithiasis 2002    Colon polyps     Constipation     CTS (carpal tunnel syndrome) 1998    Diabetes mellitus, type 2 05/08/2019    Diarrhea     DM2 (diabetes mellitus, type 2) 05/08/2019    Fatigue     Frequent headaches     Gastroesophageal reflux     GERD (gastroesophageal reflux disease)     Gestational diabetes     Headache, tension-type 1989    History of screening mammography 02/2019 2020 scheduled    HLD (hyperlipidemia)     HPV in female 2013    Hyperlipidemia     Hypertension     Hypotension     IBS (irritable bowel syndrome)     LGSIL on Pap smear of cervix 2012    LGSIL on Pap smear of cervix 2011    Menorrhagia     Migraine     Mild dysplasia of cervix (DOMINICK I)     Mild dysplasia of cervix (DOMINICK I) 2011    Numbness and tingling     in feet    Pain of left hip joint 04/06/2018    Peripheral neuropathy     PONV (postoperative nausea and vomiting)     Screening mammogram, encounter for 02/2019 2020 SCHEDULED     Sinus trouble     Spinal headache     Tendinitis of left hip 04/06/2018    Urinary frequency     Urinary urgency        Past Surgical History:   Procedure Laterality Date    ABDOMINAL HYSTERECTOMY      BRAIN SURGERY  2019    Pituitary tumor    BREAST BIOPSY  July 2024    Left    CARPAL TUNNEL RELEASE  1999    CHOLECYSTECTOMY      COLONOSCOPY  2020 2015 & 2020     COLONOSCOPY  2015, 2020    COLONOSCOPY N/A 06/13/2023    Procedure: COLONOSCOPY WITH COLD SNARE POLYPECTOMIES;  Surgeon: Jose David Berry MD;  Location: [facility];  Service: Gastroenterology;  Laterality: N/A;  COLON POLYPS    COLPOSCOPY  03/23/2011    D & C HYSTEROSCOPY ENDOMETRIAL ABLATION      ENDOMETRIAL ABLATION  2003    ENDOSCOPY  2020    HYSTERECTOMY  2004    LAPAROSCOPIC TUBAL LIGATION W/ FALOPE RING  2003    MAMMO  STEREOTACTIC BREAST BIOPSY 1ST W WO DEVICE Right 2007    TONSILLECTOMY  1990    TUMOR REMOVAL  2019    transsphenoidal    UPPER GASTROINTESTINAL ENDOSCOPY  2020         Current Outpatient Medications:     atorvastatin (LIPITOR) 10 MG tablet, TAKE 1 TABLET BY MOUTH EVERY NIGHT AT BEDTIME, Disp: 90 tablet, Rfl: 1    Continuous Blood Gluc Sensor (FreeStyle Los 3 Sensor) misc, 1 each Every 14 (Fourteen) Days., Disp: 3 each, Rfl: 5    galcanezumab-gnlm (Emgality) 120 MG/ML auto-injector pen, Inject 1 mL under the skin into the appropriate area as directed Every 30 (Thirty) Days for 90 days., Disp: 3 each, Rfl: 3    lansoprazole (PREVACID) 30 MG capsule, TAKE 1 CAPSULE BY MOUTH DAILY, Disp: 90 capsule, Rfl: 5    lidocaine (LIDODERM) 5 %, Place 1 patch on the skin as directed by provider Daily. Remove & Discard patch within 12 hours or as directed by MD, Disp: 30 each, Rfl: 0    metoprolol succinate XL (TOPROL-XL) 25 MG 24 hr tablet, TAKE 2 TABLETS BY MOUTH DAILY, Disp: 180 tablet, Rfl: 1    Ozempic, 1 MG/DOSE, 4 MG/3ML solution pen-injector, INJECT 1MG UNDER THE SKIN IN THE APPROPRIATE AREA ONCE WEEKLY AS DIRECTED, Disp: 3 mL, Rfl: 0    SUMAtriptan (Imitrex) 100 MG tablet, Take 1 tablet by mouth As Needed for Migraine., Disp: 12 tablet, Rfl: 11    traZODone (DESYREL) 100 MG tablet, Take 1 tablet by mouth Every Night., Disp: 90 tablet, Rfl: 1    LORazepam (Ativan) 0.5 MG tablet, Take 1 tablet by mouth Every 8 (Eight) Hours As Needed for Anxiety., Disp: 1 tablet, Rfl: 0    sertraline (Zoloft) 25 MG tablet, Take 1 tablet by mouth Daily., Disp: 30 tablet, Rfl: 1    Allergies   Allergen Reactions    Dulaglutide Itching        Family History   Problem Relation Age of Onset    Hypertension Father     Heart disease Father     Diabetes Father     Arthritis Father     Liver disease Father     Cirrhosis Father     Liver cancer Father     Cancer Father         Liver    Breast cancer Mother     Arthritis Mother     Liver disease  "Mother     Migraines Mother     Neuropathy Mother     Cancer Mother         Inflammatory Breast Cancer    Kidney disease Paternal Grandfather     Heart disease Paternal Grandfather     Colon cancer Paternal Grandfather 40    Colon polyps Paternal Grandfather     Uterine cancer Maternal Grandmother     Migraines Maternal Grandmother     Migraines Maternal Aunt     Migraines Paternal Aunt     Liver cancer Other     Lung cancer Other     Ovarian cancer Neg Hx     Melanoma Neg Hx     Prostate cancer Neg Hx     Deep vein thrombosis Neg Hx        Social History     Socioeconomic History    Marital status:    Tobacco Use    Smoking status: Never     Passive exposure: Never    Smokeless tobacco: Never   Vaping Use    Vaping status: Never Used   Substance and Sexual Activity    Alcohol use: Never    Drug use: Never    Sexual activity: Yes     Partners: Male     Birth control/protection: Hysterectomy, Surgical       Vital Signs:   Resp 18   Ht 157.5 cm (62\")   Wt 67.6 kg (149 lb)   BMI 27.25 kg/m²    Review of Systems    Physical Exam  Vitals and nursing note reviewed.   Constitutional:       Appearance: Normal appearance.   HENT:      Head: Normocephalic and atraumatic.   Eyes:      Extraocular Movements: Extraocular movements intact.      Pupils: Pupils are equal, round, and reactive to light.   Cardiovascular:      Pulses: Normal pulses.   Pulmonary:      Effort: Pulmonary effort is normal. No accessory muscle usage or respiratory distress.   Chest:   Breasts:     Right: Normal. No inverted nipple, mass, nipple discharge or skin change.      Left: Normal. No inverted nipple, mass, nipple discharge or skin change.      Comments: Right chest wall cyst.... well healed breast biopsy site, dense breasts  Abdominal:      General: Abdomen is flat.      Palpations: Abdomen is soft.      Tenderness: There is no abdominal tenderness. There is no guarding.   Musculoskeletal:         General: No swelling, tenderness or " deformity.      Cervical back: Neck supple.   Lymphadenopathy:      Upper Body:      Right upper body: No supraclavicular or axillary adenopathy.      Left upper body: No supraclavicular or axillary adenopathy.   Skin:     General: Skin is warm and dry.   Neurological:      General: No focal deficit present.      Mental Status: She is alert and oriented to person, place, and time.   Psychiatric:         Mood and Affect: Mood normal.         Thought Content: Thought content normal.          Result Review :               Assessment and Plan    Diagnoses and all orders for this visit:    1. Atypical ductal hyperplasia of breast (Primary)  -     Case Request; Standing  -     Follow Anesthesia Guidelines / Protocol; Standing  -     Obtain Informed Consent; Standing  -     If patient has history of breast cancer, please do not place IV or blood pressure cuff on the affected side.; Standing  -     If patient is scheduled for a needle localization in radiology, patient does NOT need LMX cream for this procedure.; Standing  -     Please contact surgeon with questions or concerns.; Standing  -     Verify / Perform Chlorhexidine Skin Prep; Standing  -     Place Sequential Compression Device; Standing  -     Maintain Sequential Compression Device; Standing  -     Mammo Breast Placement Device Initial Without Biopsy Right; Future  -     No Lab Testing Needed; Standing  -     ceFAZolin (ANCEF) 2,000 mg in sodium chloride 0.9 % 100 mL IVPB  -     Case Request    Will plan for needle loc lumpectomy    Follow Up   Return for Next scheduled followup after surgery.  Patient was given instructions and counseling regarding her condition or for health maintenance advice. Please see specific information pulled into the AVS if appropriate.         This document has been electronically signed by Ana Paula Patterson MD  August 21, 2024 12:44 EDT

## 2024-08-21 NOTE — PROGRESS NOTES
Chief Complaint: Abnormal Breast Imaging    Subjective         History of Present Illness  Laila Bell is a 53 y.o. female presents to [facility] GENERAL SURGERY to be seen for atypical ductal hyperplasia.  Her imaging and pathology are shown below;    Tissue Pathology Exam Final result 7/24/2024           Final Diagnosis   Left breast, central, MRI-guided core biopsy:               - Atypical ductal hyperplasia (ADH)               - Intraductal papilloma               - Usual ductal hyperplasia (UDH) with calcification  - Adenosis               Study Result    Narrative & Impression   MAMMO DIAGNOSTIC DIGITAL TOMOSYNTHESIS BILATERAL W CAD-     Date of Exam: 6/13/2024 12:20 PM     Indication: right breast lump patient found.; N63.10-Unspecified lump in  the right breast, unspecified quadrant.     Comparison: June 6, 2023, Chantel 3, 2022, June 2, 2021     Technique: Tomography was utilized.  Diagnostic images of the breast(s)  were obtained in the following views: MLO and cc views of both breasts  and true lateral view of both breasts     FINDINGS:  The breast density is heterogenously dense, which may obscure small  masses.     There are 2 biopsy clips on the right. There are scattered  calcifications within both breasts which seem stable. There are no  suspicious masses or areas of concerning architectural distortion.     These mammographic images were interpreted with the assistance of a  computer aided detection system.     RECOMMENDATION:  Screening Mammogram in One year.     BI-RADS ASSESSMENT: BI-RADS 2. Benign findings.        The patient's information is entered into a computerized reminder system  with a targeted due date for the next mammogram.     Note:  It has been reported that there is approximately a 15% false  negative in mammography.  Therefore, management of a palpable  abnormality should not be deferred because of a negative mammogram.                      Objective     Past  Medical History:   Diagnosis Date    Abnormal ECG 08/16/2016    Abnormal Pap smear of cervix 2011    Acid reflux     Allergic rhinitis     Breast mass July 2024    Bursitis of left hip 04/06/2018    Cholelithiasis 2002    Colon polyps     Constipation     CTS (carpal tunnel syndrome) 1998    Diabetes mellitus, type 2 05/08/2019    Diarrhea     DM2 (diabetes mellitus, type 2) 05/08/2019    Fatigue     Frequent headaches     Gastroesophageal reflux     GERD (gastroesophageal reflux disease)     Gestational diabetes     Headache, tension-type 1989    History of screening mammography 02/2019 2020 scheduled    HLD (hyperlipidemia)     HPV in female 2013    Hyperlipidemia     Hypertension     Hypotension     IBS (irritable bowel syndrome)     LGSIL on Pap smear of cervix 2012    LGSIL on Pap smear of cervix 2011    Menorrhagia     Migraine     Mild dysplasia of cervix (DOMINICK I)     Mild dysplasia of cervix (DOMINICK I) 2011    Numbness and tingling     in feet    Pain of left hip joint 04/06/2018    Peripheral neuropathy     PONV (postoperative nausea and vomiting)     Screening mammogram, encounter for 02/2019 2020 SCHEDULED     Sinus trouble     Spinal headache     Tendinitis of left hip 04/06/2018    Urinary frequency     Urinary urgency        Past Surgical History:   Procedure Laterality Date    ABDOMINAL HYSTERECTOMY      BRAIN SURGERY  2019    Pituitary tumor    BREAST BIOPSY  July 2024    Left    CARPAL TUNNEL RELEASE  1999    CHOLECYSTECTOMY      COLONOSCOPY  2020 2015 & 2020     COLONOSCOPY  2015, 2020    COLONOSCOPY N/A 06/13/2023    Procedure: COLONOSCOPY WITH COLD SNARE POLYPECTOMIES;  Surgeon: Jose David Berry MD;  Location: [facility] ENDOSCOPY;  Service: Gastroenterology;  Laterality: N/A;  COLON POLYPS    COLPOSCOPY  03/23/2011    D & C HYSTEROSCOPY ENDOMETRIAL ABLATION      ENDOMETRIAL ABLATION  2003    ENDOSCOPY  2020    HYSTERECTOMY  2004    LAPAROSCOPIC TUBAL LIGATION W/ FALOPE RING  2003    MAMMO  STEREOTACTIC BREAST BIOPSY 1ST W WO DEVICE Right 2007    TONSILLECTOMY  1990    TUMOR REMOVAL  2019    transsphenoidal    UPPER GASTROINTESTINAL ENDOSCOPY  2020         Current Outpatient Medications:     atorvastatin (LIPITOR) 10 MG tablet, TAKE 1 TABLET BY MOUTH EVERY NIGHT AT BEDTIME, Disp: 90 tablet, Rfl: 1    Continuous Blood Gluc Sensor (FreeStyle Los 3 Sensor) misc, 1 each Every 14 (Fourteen) Days., Disp: 3 each, Rfl: 5    galcanezumab-gnlm (Emgality) 120 MG/ML auto-injector pen, Inject 1 mL under the skin into the appropriate area as directed Every 30 (Thirty) Days for 90 days., Disp: 3 each, Rfl: 3    lansoprazole (PREVACID) 30 MG capsule, TAKE 1 CAPSULE BY MOUTH DAILY, Disp: 90 capsule, Rfl: 5    lidocaine (LIDODERM) 5 %, Place 1 patch on the skin as directed by provider Daily. Remove & Discard patch within 12 hours or as directed by MD, Disp: 30 each, Rfl: 0    metoprolol succinate XL (TOPROL-XL) 25 MG 24 hr tablet, TAKE 2 TABLETS BY MOUTH DAILY, Disp: 180 tablet, Rfl: 1    Ozempic, 1 MG/DOSE, 4 MG/3ML solution pen-injector, INJECT 1MG UNDER THE SKIN IN THE APPROPRIATE AREA ONCE WEEKLY AS DIRECTED, Disp: 3 mL, Rfl: 0    SUMAtriptan (Imitrex) 100 MG tablet, Take 1 tablet by mouth As Needed for Migraine., Disp: 12 tablet, Rfl: 11    traZODone (DESYREL) 100 MG tablet, Take 1 tablet by mouth Every Night., Disp: 90 tablet, Rfl: 1    LORazepam (Ativan) 0.5 MG tablet, Take 1 tablet by mouth Every 8 (Eight) Hours As Needed for Anxiety., Disp: 1 tablet, Rfl: 0    sertraline (Zoloft) 25 MG tablet, Take 1 tablet by mouth Daily., Disp: 30 tablet, Rfl: 1    Allergies   Allergen Reactions    Dulaglutide Itching        Family History   Problem Relation Age of Onset    Hypertension Father     Heart disease Father     Diabetes Father     Arthritis Father     Liver disease Father     Cirrhosis Father     Liver cancer Father     Cancer Father         Liver    Breast cancer Mother     Arthritis Mother     Liver disease  "Mother     Migraines Mother     Neuropathy Mother     Cancer Mother         Inflammatory Breast Cancer    Kidney disease Paternal Grandfather     Heart disease Paternal Grandfather     Colon cancer Paternal Grandfather 40    Colon polyps Paternal Grandfather     Uterine cancer Maternal Grandmother     Migraines Maternal Grandmother     Migraines Maternal Aunt     Migraines Paternal Aunt     Liver cancer Other     Lung cancer Other     Ovarian cancer Neg Hx     Melanoma Neg Hx     Prostate cancer Neg Hx     Deep vein thrombosis Neg Hx        Social History     Socioeconomic History    Marital status:    Tobacco Use    Smoking status: Never     Passive exposure: Never    Smokeless tobacco: Never   Vaping Use    Vaping status: Never Used   Substance and Sexual Activity    Alcohol use: Never    Drug use: Never    Sexual activity: Yes     Partners: Male     Birth control/protection: Hysterectomy, Surgical       Vital Signs:   Resp 18   Ht 157.5 cm (62\")   Wt 67.6 kg (149 lb)   BMI 27.25 kg/m²    Review of Systems    Physical Exam  Vitals and nursing note reviewed.   Constitutional:       Appearance: Normal appearance.   HENT:      Head: Normocephalic and atraumatic.   Eyes:      Extraocular Movements: Extraocular movements intact.      Pupils: Pupils are equal, round, and reactive to light.   Cardiovascular:      Pulses: Normal pulses.   Pulmonary:      Effort: Pulmonary effort is normal. No accessory muscle usage or respiratory distress.   Chest:   Breasts:     Right: Normal. No inverted nipple, mass, nipple discharge or skin change.      Left: Normal. No inverted nipple, mass, nipple discharge or skin change.      Comments: Right chest wall cyst.... well healed breast biopsy site, dense breasts  Abdominal:      General: Abdomen is flat.      Palpations: Abdomen is soft.      Tenderness: There is no abdominal tenderness. There is no guarding.   Musculoskeletal:         General: No swelling, tenderness or " deformity.      Cervical back: Neck supple.   Lymphadenopathy:      Upper Body:      Right upper body: No supraclavicular or axillary adenopathy.      Left upper body: No supraclavicular or axillary adenopathy.   Skin:     General: Skin is warm and dry.   Neurological:      General: No focal deficit present.      Mental Status: She is alert and oriented to person, place, and time.   Psychiatric:         Mood and Affect: Mood normal.         Thought Content: Thought content normal.          Result Review :               Assessment and Plan    Diagnoses and all orders for this visit:    1. Atypical ductal hyperplasia of breast (Primary)  -     Case Request; Standing  -     Follow Anesthesia Guidelines / Protocol; Standing  -     Obtain Informed Consent; Standing  -     If patient has history of breast cancer, please do not place IV or blood pressure cuff on the affected side.; Standing  -     If patient is scheduled for a needle localization in radiology, patient does NOT need LMX cream for this procedure.; Standing  -     Please contact surgeon with questions or concerns.; Standing  -     Verify / Perform Chlorhexidine Skin Prep; Standing  -     Place Sequential Compression Device; Standing  -     Maintain Sequential Compression Device; Standing  -     Mammo Breast Placement Device Initial Without Biopsy Right; Future  -     No Lab Testing Needed; Standing  -     ceFAZolin (ANCEF) 2,000 mg in sodium chloride 0.9 % 100 mL IVPB  -     Case Request    Will plan for needle loc lumpectomy    Follow Up   Return for Next scheduled followup after surgery.  Patient was given instructions and counseling regarding her condition or for health maintenance advice. Please see specific information pulled into the AVS if appropriate.         This document has been electronically signed by Ana Paula Patterson MD  August 21, 2024 12:44 EDT

## 2024-08-23 DIAGNOSIS — I10 HYPERTENSION, UNSPECIFIED TYPE: ICD-10-CM

## 2024-08-23 DIAGNOSIS — E11.65 TYPE 2 DIABETES MELLITUS WITH HYPERGLYCEMIA, WITHOUT LONG-TERM CURRENT USE OF INSULIN: ICD-10-CM

## 2024-08-23 RX ORDER — METOPROLOL SUCCINATE 25 MG/1
50 TABLET, EXTENDED RELEASE ORAL DAILY
Qty: 180 TABLET | Refills: 1 | Status: SHIPPED | OUTPATIENT
Start: 2024-08-23

## 2024-08-23 RX ORDER — SEMAGLUTIDE 1.34 MG/ML
INJECTION, SOLUTION SUBCUTANEOUS
Qty: 3 ML | Refills: 0 | Status: SHIPPED | OUTPATIENT
Start: 2024-08-23

## 2024-08-29 NOTE — PRE-PROCEDURE INSTRUCTIONS
IMPORTANT INSTRUCTIONS - PRE-ADMISSION TESTING  DO NOT EAT OR CHEW anything after midnight the night before your procedure.    You may have CLEAR liquids up to _2_ hours prior to ARRIVAL time.   Take the following medications the morning of your procedure with JUST A SIP OF WATER:  METOPROLOL, PREVACID _______________________________________________________________________________________________________________________________________________________________________________________    DO NOT BRING your medications to the hospital with you, UNLESS something has changed since your PRE-Admission Testing appointment.  Hold all vitamins, supplements, and NSAIDS (Non- steroidal anti-inflammatory meds) for one week prior to surgery (you MAY take Tylenol or Acetaminophen).  If you are diabetic, check your blood sugar the morning of your procedure. If it is less than 70 or if you are feeling symptomatic, call the following number for further instructions: [phone]_.  Use your inhalers/nebulizers as usual, the morning of your procedure. BRING YOUR INHALERS with you.   Bring your CPAP or BIPAP to hospital, ONLY IF YOU WILL BE SPENDING THE NIGHT.   Make sure you have a ride home and have someone who will stay with you the day of your procedure after you go home.  If you have any questions, please call your Pre-Admission Testing Nurse, ANEL_____ at [phone]_____.   Per anesthesia request, do not smoke for 24 hours before your procedure or as instructed by your surgeon.    Clear Liquid Diet        Find out when you need to start a clear liquid diet.   Think of “clear liquids” as anything you could read a newspaper through. This includes things like water, broth, sports drinks, or tea WITHOUT any kind of milk or cream.           Once you are told to start a clear liquid diet, only drink these things until 2 hours before arrival to the hospital or when the hospital says to stop. Total volume limitation: 8 oz.        Clear liquids you CAN drink:   Water   Clear broth: beef, chicken, vegetable, or bone broth with nothing in it   Gatorade   Lemonade or Alok-aid   Soda   Tea, coffee (NO cream or honey)   Jell-O (without fruit)   Popsicles (without fruit or cream)   Italian ices   Juice without pulp: apple, white, grape   You may use salt, pepper, and sugar  NO RED LIQUIDS     Do NOT drink:   Milk or cream   Soy milk, almond milk, coconut milk, or other non-dairy drinks and   creamers   Milkshakes or smoothies   Tomato juice   Orange juice   Grapefruit juice   Cream soups or any other than broth         Clear Liquid Diet:  Do NOT eat any solid food.  Do NOT eat or suck on mints or candy.  Do NOT chew gum.  Do NOT drink thick liquids like milk or juice with pulp in it.  Do NOT add milk, cream, or anything like soy milk or almond milk to coffee or tea.       PREOPERATIVE (BEFORE SURGERY)              BATHING INSTRUCTIONS  Instructions:    You will need to shower 1  times utilizing the soap provided; at the times indicated   below:     MORNING OF SURGERY    Wash your hair and face with normal shampoo and soap, rinse it well before using the surgical soap.      In the shower, wet the skin completely with water from your neck to your feet. Apply the cleanser to your   body ONLY FROM THE NECK TO YOUR FEET.     Do NOT USE THE CLEANSER ON YOUR FACE, HEAD, OR GENITAL (PRIVATE) AREAS.   Keep it out of your eyes, ears, and mouth because of the risk of injury to those areas.      Scrub with a clean washcloth for each bath utilizing the soap provided from the top of your body to the   bottom starting at the neck area.      Pay close attention to your armpits, groin area, and the site of surgery.      Wash your body gently for 5 minutes. Stand outside the stream or turn off the water while scrubbing your   body. Do NOT wash with your regular soap after the surgical cleanser is used.      RINSE THE CLEANSER OFF COMPLETELY with plenty of  water. Rinse the area again thoroughly.      Dry off with a clean towel. The surgical soap can cause dryness; however do NOT APPLY LOTION,   CREAM, POWDER, and/or DEODORANT AFTER SHOWERING.     Be sure to where clean clothes after showering.      Ensure CLEAN BED LINENS AFTER FIRST wash with the surgical soap.      NO PETS ALLOWED IN THE BED with you after utilizing the surgical soap.

## 2024-08-30 ENCOUNTER — HOSPITAL ENCOUNTER (OUTPATIENT)
Dept: MAMMOGRAPHY | Facility: HOSPITAL | Age: 54
Discharge: HOME | End: 2024-08-30
Payer: COMMERCIAL

## 2024-08-30 ENCOUNTER — PATIENT OUTREACH (OUTPATIENT)
Dept: ONCOLOGY | Facility: HOSPITAL | Age: 54
End: 2024-08-30
Payer: COMMERCIAL

## 2024-08-30 ENCOUNTER — TELEPHONE (OUTPATIENT)
Dept: SURGERY | Facility: CLINIC | Age: 54
End: 2024-08-30
Payer: COMMERCIAL

## 2024-08-30 ENCOUNTER — ANESTHESIA (OUTPATIENT)
Dept: PERIOP | Facility: HOSPITAL | Age: 54
End: 2024-08-30
Payer: COMMERCIAL

## 2024-08-30 ENCOUNTER — HOSPITAL ENCOUNTER (OUTPATIENT)
Facility: HOSPITAL | Age: 54
Setting detail: HOSPITAL OUTPATIENT SURGERY
Discharge: HOME | End: 2024-08-30
Attending: SURGERY | Admitting: SURGERY
Payer: COMMERCIAL

## 2024-08-30 ENCOUNTER — APPOINTMENT (OUTPATIENT)
Dept: MAMMOGRAPHY | Facility: HOSPITAL | Age: 54
End: 2024-08-30
Payer: COMMERCIAL

## 2024-08-30 ENCOUNTER — ANESTHESIA EVENT (OUTPATIENT)
Dept: PERIOP | Facility: HOSPITAL | Age: 54
End: 2024-08-30
Payer: COMMERCIAL

## 2024-08-30 VITALS
WEIGHT: 144.4 LBS | OXYGEN SATURATION: 100 % | HEIGHT: 62 IN | SYSTOLIC BLOOD PRESSURE: 116 MMHG | BODY MASS INDEX: 26.57 KG/M2 | DIASTOLIC BLOOD PRESSURE: 71 MMHG | TEMPERATURE: 98.3 F | RESPIRATION RATE: 16 BRPM | HEART RATE: 59 BPM

## 2024-08-30 DIAGNOSIS — N60.99 ATYPICAL DUCTAL HYPERPLASIA OF BREAST: ICD-10-CM

## 2024-08-30 LAB — GLUCOSE BLDC GLUCOMTR-MCNC: 141 MG/DL (ref 70–99)

## 2024-08-30 PROCEDURE — 25010000002 MIDAZOLAM PER 1MG: Performed by: ANESTHESIOLOGY

## 2024-08-30 PROCEDURE — 88304 TISSUE EXAM BY PATHOLOGIST: CPT | Performed by: SURGERY

## 2024-08-30 PROCEDURE — 25010000002 DEXAMETHASONE PER 1 MG: Performed by: NURSE ANESTHETIST, CERTIFIED REGISTERED

## 2024-08-30 PROCEDURE — 19301 PARTIAL MASTECTOMY: CPT | Performed by: SURGERY

## 2024-08-30 PROCEDURE — 25810000003 LACTATED RINGERS PER 1000 ML: Performed by: ANESTHESIOLOGY

## 2024-08-30 PROCEDURE — 25010000002 CEFAZOLIN PER 500 MG: Performed by: SURGERY

## 2024-08-30 PROCEDURE — 88307 TISSUE EXAM BY PATHOLOGIST: CPT | Performed by: SURGERY

## 2024-08-30 PROCEDURE — 76098 X-RAY EXAM SURGICAL SPECIMEN: CPT

## 2024-08-30 PROCEDURE — 82948 REAGENT STRIP/BLOOD GLUCOSE: CPT | Performed by: ANESTHESIOLOGY

## 2024-08-30 PROCEDURE — 25010000002 ONDANSETRON PER 1 MG: Performed by: NURSE ANESTHETIST, CERTIFIED REGISTERED

## 2024-08-30 PROCEDURE — 25010000002 GENTAMICIN PER 80 MG: Performed by: SURGERY

## 2024-08-30 PROCEDURE — 11402 EXC TR-EXT B9+MARG 1.1-2 CM: CPT | Performed by: SURGERY

## 2024-08-30 PROCEDURE — C1819 TISSUE LOCALIZATION-EXCISION: HCPCS

## 2024-08-30 PROCEDURE — 25010000002 LIDOCAINE 1 % SOLUTION

## 2024-08-30 PROCEDURE — 25010000002 HYDROMORPHONE 1 MG/ML SOLUTION: Performed by: NURSE ANESTHETIST, CERTIFIED REGISTERED

## 2024-08-30 PROCEDURE — 25010000002 PROPOFOL 10 MG/ML EMULSION: Performed by: NURSE ANESTHETIST, CERTIFIED REGISTERED

## 2024-08-30 PROCEDURE — 25010000002 BUPIVACAINE (PF) 0.25 % SOLUTION: Performed by: SURGERY

## 2024-08-30 PROCEDURE — 25010000002 FENTANYL CITRATE (PF) 50 MCG/ML SOLUTION: Performed by: NURSE ANESTHETIST, CERTIFIED REGISTERED

## 2024-08-30 PROCEDURE — 25010000002 METOCLOPRAMIDE PER 10 MG: Performed by: ANESTHESIOLOGY

## 2024-08-30 PROCEDURE — 25010000002 SUGAMMADEX 200 MG/2ML SOLUTION: Performed by: NURSE ANESTHETIST, CERTIFIED REGISTERED

## 2024-08-30 RX ORDER — DEXMEDETOMIDINE HYDROCHLORIDE 4 UG/ML
INJECTION, SOLUTION INTRAVENOUS AS NEEDED
Status: DISCONTINUED | OUTPATIENT
Start: 2024-08-30 | End: 2024-08-30 | Stop reason: SURG

## 2024-08-30 RX ORDER — MIDAZOLAM HYDROCHLORIDE 2 MG/2ML
2 INJECTION, SOLUTION INTRAMUSCULAR; INTRAVENOUS ONCE
Status: COMPLETED | OUTPATIENT
Start: 2024-08-30 | End: 2024-08-30

## 2024-08-30 RX ORDER — DEXAMETHASONE SODIUM PHOSPHATE 4 MG/ML
INJECTION, SOLUTION INTRA-ARTICULAR; INTRALESIONAL; INTRAMUSCULAR; INTRAVENOUS; SOFT TISSUE AS NEEDED
Status: DISCONTINUED | OUTPATIENT
Start: 2024-08-30 | End: 2024-08-30 | Stop reason: SURG

## 2024-08-30 RX ORDER — OXYCODONE AND ACETAMINOPHEN 5; 325 MG/1; MG/1
1 TABLET ORAL EVERY 6 HOURS PRN
Qty: 20 TABLET | Refills: 0 | Status: SHIPPED | OUTPATIENT
Start: 2024-08-30

## 2024-08-30 RX ORDER — ROCURONIUM BROMIDE 10 MG/ML
INJECTION, SOLUTION INTRAVENOUS AS NEEDED
Status: DISCONTINUED | OUTPATIENT
Start: 2024-08-30 | End: 2024-08-30 | Stop reason: SURG

## 2024-08-30 RX ORDER — ONDANSETRON 4 MG/1
4 TABLET, ORALLY DISINTEGRATING ORAL ONCE AS NEEDED
Status: DISCONTINUED | OUTPATIENT
Start: 2024-08-30 | End: 2024-08-30 | Stop reason: HOSPADM

## 2024-08-30 RX ORDER — SODIUM CHLORIDE, SODIUM LACTATE, POTASSIUM CHLORIDE, CALCIUM CHLORIDE 600; 310; 30; 20 MG/100ML; MG/100ML; MG/100ML; MG/100ML
9 INJECTION, SOLUTION INTRAVENOUS CONTINUOUS PRN
Status: DISCONTINUED | OUTPATIENT
Start: 2024-08-30 | End: 2024-08-30 | Stop reason: HOSPADM

## 2024-08-30 RX ORDER — MEPERIDINE HYDROCHLORIDE 25 MG/ML
12.5 INJECTION INTRAMUSCULAR; INTRAVENOUS; SUBCUTANEOUS
Status: DISCONTINUED | OUTPATIENT
Start: 2024-08-30 | End: 2024-08-30 | Stop reason: HOSPADM

## 2024-08-30 RX ORDER — FENTANYL CITRATE 50 UG/ML
INJECTION, SOLUTION INTRAMUSCULAR; INTRAVENOUS AS NEEDED
Status: DISCONTINUED | OUTPATIENT
Start: 2024-08-30 | End: 2024-08-30 | Stop reason: SURG

## 2024-08-30 RX ORDER — PROMETHAZINE HYDROCHLORIDE 12.5 MG/1
25 TABLET ORAL ONCE AS NEEDED
Status: DISCONTINUED | OUTPATIENT
Start: 2024-08-30 | End: 2024-08-30 | Stop reason: HOSPADM

## 2024-08-30 RX ORDER — ONDANSETRON 2 MG/ML
4 INJECTION INTRAMUSCULAR; INTRAVENOUS ONCE AS NEEDED
Status: DISCONTINUED | OUTPATIENT
Start: 2024-08-30 | End: 2024-08-30 | Stop reason: HOSPADM

## 2024-08-30 RX ORDER — PROPOFOL 10 MG/ML
VIAL (ML) INTRAVENOUS AS NEEDED
Status: DISCONTINUED | OUTPATIENT
Start: 2024-08-30 | End: 2024-08-30 | Stop reason: SURG

## 2024-08-30 RX ORDER — PROMETHAZINE HYDROCHLORIDE 25 MG/1
25 SUPPOSITORY RECTAL ONCE AS NEEDED
Status: DISCONTINUED | OUTPATIENT
Start: 2024-08-30 | End: 2024-08-30 | Stop reason: HOSPADM

## 2024-08-30 RX ORDER — METOCLOPRAMIDE HYDROCHLORIDE 5 MG/ML
10 INJECTION INTRAMUSCULAR; INTRAVENOUS
Status: COMPLETED | OUTPATIENT
Start: 2024-08-30 | End: 2024-08-30

## 2024-08-30 RX ORDER — ACETAMINOPHEN 500 MG
1000 TABLET ORAL ONCE
Status: COMPLETED | OUTPATIENT
Start: 2024-08-30 | End: 2024-08-30

## 2024-08-30 RX ORDER — SCOLOPAMINE TRANSDERMAL SYSTEM 1 MG/1
1 PATCH, EXTENDED RELEASE TRANSDERMAL ONCE
Status: DISCONTINUED | OUTPATIENT
Start: 2024-08-30 | End: 2024-08-30 | Stop reason: HOSPADM

## 2024-08-30 RX ORDER — LIDOCAINE HYDROCHLORIDE 10 MG/ML
INJECTION, SOLUTION INFILTRATION; PERINEURAL
Status: COMPLETED
Start: 2024-08-30 | End: 2024-08-30

## 2024-08-30 RX ORDER — SULFAMETHOXAZOLE/TRIMETHOPRIM 800-160 MG
1 TABLET ORAL 2 TIMES DAILY
Qty: 14 TABLET | Refills: 0 | Status: SHIPPED | OUTPATIENT
Start: 2024-08-30 | End: 2024-09-06

## 2024-08-30 RX ORDER — LIDOCAINE HYDROCHLORIDE 10 MG/ML
10 INJECTION, SOLUTION INFILTRATION; PERINEURAL ONCE
Status: DISCONTINUED | OUTPATIENT
Start: 2024-08-30 | End: 2024-08-31 | Stop reason: HOSPADM

## 2024-08-30 RX ORDER — ONDANSETRON 2 MG/ML
INJECTION INTRAMUSCULAR; INTRAVENOUS AS NEEDED
Status: DISCONTINUED | OUTPATIENT
Start: 2024-08-30 | End: 2024-08-30 | Stop reason: SURG

## 2024-08-30 RX ORDER — BUPIVACAINE HYDROCHLORIDE 2.5 MG/ML
INJECTION, SOLUTION EPIDURAL; INFILTRATION; INTRACAUDAL AS NEEDED
Status: DISCONTINUED | OUTPATIENT
Start: 2024-08-30 | End: 2024-08-30 | Stop reason: HOSPADM

## 2024-08-30 RX ORDER — OXYCODONE HYDROCHLORIDE 5 MG/1
5 TABLET ORAL
Status: COMPLETED | OUTPATIENT
Start: 2024-08-30 | End: 2024-08-30

## 2024-08-30 RX ORDER — GENTAMICIN 40 MG/ML
INJECTION, SOLUTION INTRAMUSCULAR; INTRAVENOUS AS NEEDED
Status: DISCONTINUED | OUTPATIENT
Start: 2024-08-30 | End: 2024-08-30 | Stop reason: HOSPADM

## 2024-08-30 RX ORDER — LIDOCAINE HYDROCHLORIDE 20 MG/ML
INJECTION, SOLUTION EPIDURAL; INFILTRATION; INTRACAUDAL; PERINEURAL AS NEEDED
Status: DISCONTINUED | OUTPATIENT
Start: 2024-08-30 | End: 2024-08-30 | Stop reason: SURG

## 2024-08-30 RX ORDER — FENTANYL CITRATE 50 UG/ML
50 INJECTION, SOLUTION INTRAMUSCULAR; INTRAVENOUS
Status: DISCONTINUED | OUTPATIENT
Start: 2024-08-30 | End: 2024-08-30 | Stop reason: HOSPADM

## 2024-08-30 RX ADMIN — PROPOFOL 200 MCG/KG/MIN: 10 INJECTION, EMULSION INTRAVENOUS at 10:43

## 2024-08-30 RX ADMIN — DEXAMETHASONE SODIUM PHOSPHATE 4 MG: 4 INJECTION, SOLUTION INTRAMUSCULAR; INTRAVENOUS at 10:39

## 2024-08-30 RX ADMIN — FENTANYL CITRATE 50 MCG: 50 INJECTION, SOLUTION INTRAMUSCULAR; INTRAVENOUS at 10:37

## 2024-08-30 RX ADMIN — SUGAMMADEX 130 MG: 100 INJECTION, SOLUTION INTRAVENOUS at 11:40

## 2024-08-30 RX ADMIN — METOCLOPRAMIDE HYDROCHLORIDE 10 MG: 5 INJECTION INTRAMUSCULAR; INTRAVENOUS at 09:59

## 2024-08-30 RX ADMIN — HYDROMORPHONE HYDROCHLORIDE 0.5 MG: 1 INJECTION, SOLUTION INTRAMUSCULAR; INTRAVENOUS; SUBCUTANEOUS at 12:13

## 2024-08-30 RX ADMIN — OXYCODONE HYDROCHLORIDE 5 MG: 5 TABLET ORAL at 12:34

## 2024-08-30 RX ADMIN — MIDAZOLAM HYDROCHLORIDE 2 MG: 1 INJECTION, SOLUTION INTRAMUSCULAR; INTRAVENOUS at 10:26

## 2024-08-30 RX ADMIN — SODIUM CHLORIDE, POTASSIUM CHLORIDE, SODIUM LACTATE AND CALCIUM CHLORIDE 9 ML/HR: 600; 310; 30; 20 INJECTION, SOLUTION INTRAVENOUS at 09:12

## 2024-08-30 RX ADMIN — ACETAMINOPHEN 1000 MG: 500 TABLET ORAL at 09:59

## 2024-08-30 RX ADMIN — LIDOCAINE HYDROCHLORIDE 60 MG: 20 INJECTION, SOLUTION INTRAVENOUS at 10:39

## 2024-08-30 RX ADMIN — OXYCODONE HYDROCHLORIDE 5 MG: 5 TABLET ORAL at 12:13

## 2024-08-30 RX ADMIN — SODIUM CHLORIDE 2000 MG: 9 INJECTION, SOLUTION INTRAVENOUS at 10:37

## 2024-08-30 RX ADMIN — ROCURONIUM BROMIDE 50 MG: 10 INJECTION, SOLUTION INTRAVENOUS at 10:39

## 2024-08-30 RX ADMIN — PROPOFOL 130 MG: 10 INJECTION, EMULSION INTRAVENOUS at 10:39

## 2024-08-30 RX ADMIN — LIDOCAINE HYDROCHLORIDE: 10 INJECTION, SOLUTION INFILTRATION; PERINEURAL at 09:54

## 2024-08-30 RX ADMIN — DEXMEDETOMIDINE HYDROCHLORIDE IN 0.9% SODIUM CHLORIDE 4 MCG: 4 INJECTION INTRAVENOUS at 10:53

## 2024-08-30 RX ADMIN — ONDANSETRON HYDROCHLORIDE 4 MG: 2 SOLUTION INTRAMUSCULAR; INTRAVENOUS at 11:31

## 2024-08-30 RX ADMIN — SCOPALAMINE 1 PATCH: 1 PATCH, EXTENDED RELEASE TRANSDERMAL at 09:12

## 2024-08-30 RX ADMIN — FENTANYL CITRATE 50 MCG: 50 INJECTION, SOLUTION INTRAMUSCULAR; INTRAVENOUS at 10:53

## 2024-08-30 RX ADMIN — HYDROMORPHONE HYDROCHLORIDE 0.5 MG: 1 INJECTION, SOLUTION INTRAMUSCULAR; INTRAVENOUS; SUBCUTANEOUS at 12:25

## 2024-08-30 NOTE — TELEPHONE ENCOUNTER
Caller: Laila Bell    Relationship to patient: Self    Best call back number: [phone]    Chief complaint: LUMPECTOMY     Type of visit: 1 WK F/U FOR 8/30/24    Requested date: 1 WK     If rescheduling, when is the original appointment:      Additional notes:

## 2024-08-30 NOTE — ANESTHESIA POSTPROCEDURE EVALUATION
Patient: Laila Bell    Procedure Summary       Date: 08/30/24 Room / Location: [facility] OR  / [facility] OR [facility]    Anesthesia Start: 1035 Anesthesia Stop: 1153    Procedure: Left breast needle localized lumpectomy, RIGHT BACK CYST EXCISION (Left: Breast) Diagnosis:       Atypical ductal hyperplasia of breast      (Atypical ductal hyperplasia of breast [N60.99])    Surgeons: Ana Paula Patterson MD Provider: Cristo Hodges MD    Anesthesia Type: general ASA Status: 2            Anesthesia Type: general    Vitals  Vitals Value Taken Time   /71 08/30/24 1251   Temp 36.2 °C (97.2 °F) 08/30/24 1150   Pulse 61 08/30/24 1251   Resp 14 08/30/24 1155   SpO2 100 % 08/30/24 1251   Vitals shown include unfiled device data.        Post Anesthesia Care and Evaluation    Patient location during evaluation: bedside  Patient participation: complete - patient participated  Level of consciousness: awake  Pain score: 2  Pain management: adequate    Airway patency: patent  PONV Status: none  Cardiovascular status: acceptable and stable  Respiratory status: acceptable  Hydration status: acceptable

## 2024-08-30 NOTE — OP NOTE
BREAST BIOPSY WITH NEEDLE LOCALIZATION  Procedure Report    Patient Name:  Laila Bell  YOB: 1970    Date of Surgery:  8/30/2024     Indications:  ADH of left breast and right back cyst    Pre-op Diagnosis:   Atypical ductal hyperplasia of breast [N60.99]       Post-Op Diagnosis Codes:     * Atypical ductal hyperplasia of breast [N60.99]    Procedure/CPT® Codes:      Procedure(s):  Left breast needle localized lumpectomy, RIGHT BACK CYST EXCISION    Staff:  Surgeon(s):  Ana Paula Patterson MD    Assistant: Ian Augustin    Anesthesia: General    Estimated Blood Loss: minimal    Implants:    Nothing was implanted during the procedure    Specimen:          Specimens       ID Source Type Tests Collected By Collected At Frozen?    A Breast, Left Tissue TISSUE PATHOLOGY EXAM   Ana Paula Patterson MD 8/30/24 1104     Description: left breast mass with needle localization short stitch superior long stitch lateral    Comment: Osec # 3   [id number]    B Back, Upper Tissue TISSUE PATHOLOGY EXAM   Ana Paula Patterson MD 8/30/24 1125     Description: upper right back cyst                Findings: none    Complications: none    Description of Procedure:   The risks and benefits and treatment options were discussed with her. After informed consent was obtained, she was taken to the OR and placed supine on the table. She was taken to radiology where a wire was placed localizing the clips. An incision was made around the wire and then skin flaps were raised anteriorly and the tissue was excised circumferentially around the area. The specimen was removed, marked for orientation, and handed off as a specimen. The wound bed was irrigated. Hemostasis was achieved with electrocautery and clips. The deeper layers were closed with 2-0 Vicryl, the skin was closed with a 4-0 subcuticular stitch. The specimen had been sent to radiology for evaluation and they did call back and confirm that we had the wire, the clip,  the abnormal appearing tissue and a good margin of normal tissue around it.    She was re-prepped and draped for the cyst excision.  We began by making elliptical incision around the cyst and dissecting down use electrocautery to reach normal-appearing tissue.  The cyst was then excised circumferentially and handed off as a specimen.  It was about 2 cm in size.  Electrocautery was used to achieve hemostasis.  The wound was copiously irrigated and and the deeper layers were closed with 2-0 Vicryl and skin was closed with a running 4 subcuticular stitch.  Patient was taken to recovery in good condition.  All counts were correct at the end of the case.    Assistant: Ian Augustin  was responsible for performing the following activities: Retraction, Suction, and Irrigation and their skilled assistance was necessary for the success of this case.            Ana Paula Patterson MD     Date: 8/30/2024  Time: 11:41 EDT

## 2024-08-30 NOTE — ANESTHESIA PREPROCEDURE EVALUATION
Anesthesia Evaluation     Patient summary reviewed and Nursing notes reviewed   history of anesthetic complications:  PONV  NPO Solid Status: > 8 hours  NPO Liquid Status: > 2 hours           Airway   Mallampati: II  TM distance: >3 FB  Neck ROM: full  No difficulty expected  Dental      Pulmonary - negative pulmonary ROS and normal exam    breath sounds clear to auscultation  Cardiovascular - normal exam  Exercise tolerance: good (4-7 METS)    Rhythm: regular  Rate: normal    (+) hypertension, hyperlipidemia      Neuro/Psych  (+) headaches, numbness  GI/Hepatic/Renal/Endo    (+) GERD, diabetes mellitus gestational    Musculoskeletal (-) negative ROS    Abdominal    Substance History - negative use     OB/GYN negative ob/gyn ROS         Other      history of cancer remission    ROS/Med Hx Other: PAT Nursing Notes unavailable.                     Anesthesia Plan    ASA 2     general   total IV anesthesia  (Patient understands anesthesia not responsible for dental damage.    Last ozempic 12 days ago, ponv, plan on tiva)  intravenous induction     Anesthetic plan, risks, benefits, and alternatives have been provided, discussed and informed consent has been obtained with: patient.    Use of blood products discussed with patient .    Plan discussed with CRNA.        CODE STATUS:

## 2024-08-30 NOTE — DISCHARGE INSTRUCTIONS
DISCHARGE INSTRUCTIONS  [] Breast Biopsy  [] Lumpectomy  [] Lymph Node Dissection           For your surgery you had:  General anesthesia (you may have a sore throat for the first 24 hours)  IV sedation  Local anesthesia  Monitored anesthesia care  You have received a medicated patch for nausea prevention today (behind your ear). It is recommended that you remove it 24-48 hours post-operatively. It must be removed within 72 hours.   You have received an anesthesia medication today that can cause hormonal forms of birth control to be ineffective. You should use a different form of birth control (to prevent pregnancy) for 7 days.   You may experience dizziness, drowsiness, or light-headedness for several hours following surgery/procedure.  Do not stay alone today or tonight.  Limit your activity for 24 hours.  You should not drive, operate machinery, drink alcohol, or sign legally binding documents for 24 hours or while you are taking pain medication.  Resume your diet slowly.  Follow whatever special dietary instructions you may have been given by your doctor.  Last dose of pain medication was given at:   .  NOTIFY YOUR DOCTOR IF YOU EXPERIENCE ANY OF THE FOLLOWING:  Temperature greater than 101 degrees Fahrenheit  Shaking Chills  Redness or excessive drainage from incision  Nausea, vomiting and/or pain that is not controlled by prescribed medications  Increase in bleeding or bleeding that is excessive  Unable to urinate in 6 hours after surgery  If unable to reach your doctor, please go to the closest Emergency Room  You may begin dressing changes:     [x] in 48 hours Sunday 9/1/24  You may remove your dressing on Sunday 9/1/24  You may shower Sunday 9/1/24  Ice pack for 24-48 hours.  Wear a bra for support.  Do not do any heavy lifting.  After your surgery you may notice some bruising.  This is normal.  Medications per physician instructions as indicated on Discharge Medication Information Sheet.  You should see  Dr. Patterson for follow-up care   on Monday Sep 9, 2024 9:50 AM   Phone number: [phone]    SPECIAL INSTRUCTIONS:

## 2024-09-09 ENCOUNTER — OFFICE VISIT (OUTPATIENT)
Dept: SURGERY | Facility: CLINIC | Age: 54
End: 2024-09-09
Payer: COMMERCIAL

## 2024-09-09 VITALS — BODY MASS INDEX: 26.5 KG/M2 | WEIGHT: 144 LBS | HEIGHT: 62 IN | RESPIRATION RATE: 18 BRPM

## 2024-09-09 DIAGNOSIS — N60.99 ATYPICAL DUCTAL HYPERPLASIA OF BREAST: Primary | ICD-10-CM

## 2024-09-09 PROCEDURE — 99024 POSTOP FOLLOW-UP VISIT: CPT | Performed by: SURGERY

## 2024-09-09 NOTE — PROGRESS NOTES
"Chief Complaint  Follow-up    Subjective          Follow-up    The patient is here to follow up on needle loc lumpectomy and cyst excision.  They are doing well and have no complaints.  Pathology is shown below:    pending    Objective   Vital Signs:   Resp 18   Ht 157.5 cm (62\")   Wt 65.3 kg (144 lb)   BMI 26.34 kg/m²     Physical Exam  Vitals and nursing note reviewed.   Constitutional:       General: She is not in acute distress.     Appearance: Normal appearance. She is well-developed.   HENT:      Head: Normocephalic and atraumatic.   Eyes:      Extraocular Movements: Extraocular movements intact.      Pupils: Pupils are equal, round, and reactive to light.   Cardiovascular:      Pulses: Normal pulses.   Pulmonary:      Effort: Pulmonary effort is normal. No retractions.      Breath sounds: Normal air entry. No wheezing.   Abdominal:      General: There is no distension.      Palpations: Abdomen is soft.      Tenderness: There is no abdominal tenderness.      Hernia: No hernia is present.   Musculoskeletal:         General: No swelling or deformity.      Cervical back: Neck supple.   Skin:     General: Skin is warm and dry.      Findings: No erythema.      Comments: Surgical Incision Healing Well on breast and on back   Neurological:      General: No focal deficit present.      Mental Status: She is alert and oriented to person, place, and time.      Motor: Motor function is intact.   Psychiatric:         Mood and Affect: Mood normal.         Thought Content: Thought content normal.            Result Review :                 Assessment and Plan    Diagnoses and all orders for this visit:    1. Atypical ductal hyperplasia of breast (Primary)  -     Ambulatory Referral to Oncology    Will call with pathology  Refer to oncology for chemical risk reduction    Follow Up   Return for will call with pathology.  Patient was given instructions and counseling regarding her condition or for health maintenance advice. " Please see specific information pulled into the AVS if appropriate.     Answers submitted by the patient for this visit:  Other (Submitted on 9/4/2024)  Please describe your symptoms.: Surgery follow-up  Have you had these symptoms before?: Yes  How long have you been having these symptoms?: 5-7 days  Primary Reason for Visit (Submitted on 9/4/2024)  What is the primary reason for your visit?: Other

## 2024-09-11 LAB
CYTO UR: NORMAL
CYTO UR: NORMAL
LAB AP CASE REPORT: NORMAL
LAB AP CASE REPORT: NORMAL
LAB AP CLINICAL INFORMATION: NORMAL
LAB AP CLINICAL INFORMATION: NORMAL
LAB AP DIAGNOSIS COMMENT: NORMAL
LAB AP SPECIAL STAINS: NORMAL
LAB AP SPECIAL STAINS: NORMAL
LAB AP SYNOPTIC CHECKLIST: NORMAL
PATH REPORT.ADDENDUM SPEC: NORMAL
PATH REPORT.FINAL DX SPEC: NORMAL
PATH REPORT.FINAL DX SPEC: NORMAL
PATH REPORT.GROSS SPEC: NORMAL
PATH REPORT.GROSS SPEC: NORMAL

## 2024-09-12 ENCOUNTER — TELEPHONE (OUTPATIENT)
Dept: SURGERY | Facility: CLINIC | Age: 54
End: 2024-09-12
Payer: COMMERCIAL

## 2024-09-12 DIAGNOSIS — D05.10 DUCTAL CARCINOMA IN SITU (DCIS) OF BREAST, UNSPECIFIED LATERALITY: ICD-10-CM

## 2024-09-12 DIAGNOSIS — N60.99 ATYPICAL DUCTAL HYPERPLASIA OF BREAST: Primary | ICD-10-CM

## 2024-09-12 RX ORDER — DOXYCYCLINE 100 MG/1
100 CAPSULE ORAL 2 TIMES DAILY
Qty: 20 CAPSULE | Refills: 0 | Status: SHIPPED | OUTPATIENT
Start: 2024-09-12 | End: 2024-09-22

## 2024-09-16 ENCOUNTER — CONSULT (OUTPATIENT)
Dept: RADIATION ONCOLOGY | Facility: HOSPITAL | Age: 54
End: 2024-09-16
Payer: COMMERCIAL

## 2024-09-16 VITALS
HEART RATE: 81 BPM | RESPIRATION RATE: 18 BRPM | SYSTOLIC BLOOD PRESSURE: 117 MMHG | BODY MASS INDEX: 27.18 KG/M2 | DIASTOLIC BLOOD PRESSURE: 74 MMHG | OXYGEN SATURATION: 100 % | WEIGHT: 148.59 LBS | TEMPERATURE: 98.7 F

## 2024-09-16 DIAGNOSIS — D05.12 DUCTAL CARCINOMA IN SITU (DCIS) OF LEFT BREAST: Primary | ICD-10-CM

## 2024-09-16 PROCEDURE — G0463 HOSPITAL OUTPT CLINIC VISIT: HCPCS | Performed by: RADIOLOGY

## 2024-09-16 RX ORDER — GALCANEZUMAB 120 MG/ML
120 INJECTION, SOLUTION SUBCUTANEOUS ONCE
COMMUNITY
Start: 2024-09-13

## 2024-09-30 ENCOUNTER — LAB (OUTPATIENT)
Dept: ONCOLOGY | Facility: HOSPITAL | Age: 54
End: 2024-09-30
Payer: COMMERCIAL

## 2024-09-30 ENCOUNTER — CONSULT (OUTPATIENT)
Dept: ONCOLOGY | Facility: HOSPITAL | Age: 54
End: 2024-09-30
Payer: COMMERCIAL

## 2024-09-30 VITALS
TEMPERATURE: 97.9 F | HEART RATE: 76 BPM | OXYGEN SATURATION: 96 % | WEIGHT: 149.03 LBS | HEIGHT: 62 IN | RESPIRATION RATE: 18 BRPM | DIASTOLIC BLOOD PRESSURE: 68 MMHG | BODY MASS INDEX: 27.43 KG/M2 | SYSTOLIC BLOOD PRESSURE: 104 MMHG

## 2024-09-30 DIAGNOSIS — Z78.0 POST-MENOPAUSAL: ICD-10-CM

## 2024-09-30 DIAGNOSIS — D05.12 DUCTAL CARCINOMA IN SITU (DCIS) OF LEFT BREAST: ICD-10-CM

## 2024-09-30 DIAGNOSIS — E11.65 TYPE 2 DIABETES MELLITUS WITH HYPERGLYCEMIA, WITHOUT LONG-TERM CURRENT USE OF INSULIN: ICD-10-CM

## 2024-09-30 DIAGNOSIS — Z78.0 POST-MENOPAUSAL: Primary | ICD-10-CM

## 2024-09-30 LAB
ESTRADIOL SERPL HS-MCNC: <5 PG/ML
FSH SERPL-ACNC: 84.8 MIU/ML

## 2024-09-30 PROCEDURE — 82670 ASSAY OF TOTAL ESTRADIOL: CPT

## 2024-09-30 PROCEDURE — 83001 ASSAY OF GONADOTROPIN (FSH): CPT

## 2024-09-30 PROCEDURE — G0463 HOSPITAL OUTPT CLINIC VISIT: HCPCS | Performed by: INTERNAL MEDICINE

## 2024-09-30 PROCEDURE — 36415 COLL VENOUS BLD VENIPUNCTURE: CPT

## 2024-09-30 PROCEDURE — 99204 OFFICE O/P NEW MOD 45 MIN: CPT | Performed by: INTERNAL MEDICINE

## 2024-09-30 RX ORDER — GINGER ROOT/GINGER ROOT EXT 262.5 MG
1 CAPSULE ORAL DAILY
Qty: 90 TABLET | Refills: 1 | Status: SHIPPED | OUTPATIENT
Start: 2024-09-30

## 2024-09-30 RX ORDER — SEMAGLUTIDE 1.34 MG/ML
INJECTION, SOLUTION SUBCUTANEOUS
Qty: 3 ML | Refills: 0 | Status: SHIPPED | OUTPATIENT
Start: 2024-09-30

## 2024-09-30 RX ORDER — ANASTROZOLE 1 MG/1
1 TABLET ORAL DAILY
Qty: 30 TABLET | Refills: 1 | Status: SHIPPED | OUTPATIENT
Start: 2024-09-30

## 2024-09-30 NOTE — PROGRESS NOTES
Chief Complaint  [facility] - HEMATOLOGY    Mychal Calvillo, Mychal Hurley APRN    [facility]          Laila Gardenia Bell presents to [facility]EMAT[facility]Y for DCIS    Patient is a very pleasant 53-year-old female with past medical history of allergic rhinitis, gastroesophageal reflux disease, hypertension, hyperlipidemia, irritable bowel syndrome who presents for oncology evaluation regarding DCIS of the left breast.  She underwent MRI of the breast for history of dense breast tissue 7/2/2024 revealed a 0.8 cm enhancing mass in the central left breast, 6 cm from the nipple.  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  She underwent lumpectomy on 8/30/2024 revealing low-grade DCIS measuring 6 mm in greatest dimension with negative margins, ER positive/NH positive.  She reports feeling well overall after surgery with no complaints.  She does have hot flashes.  She had uterus taken out for heavy bleeding many years ago.  She still has her ovaries.  Family history of inflammatory breast cancer in her mother and age above 60.    Oncology/Hematology History Overview Note   7/2/24: MRI breast (screening due to family history breast cancer)  revealed a 0.8 cm enhancing mass in the central left breast, 6 cm from the nipple.      7/24/24: Pathology from MRI guided core needle biopsy revealed atypical ductal hyperplasia with an intraductal papilloma.      8/30/24: Left breast lumpectomy with pathology revealing low-grade DCIS measuring 6 mm in greatest dimension with negative margins, ER positive/NH positive.      9/30/24: Orders written for anastrozole 1 mg daily.     Adjuvant radiation planned.         Ductal carcinoma in situ (DCIS) of left breast   9/30/2024 Initial Diagnosis    Ductal carcinoma in situ (DCIS) of left breast     9/30/2024 Cancer Staged    Staging form: Breast, AJCC 8th Edition  -  Pathologic: Stage Unknown (pTis (DCIS), pNX, cM0, ER+, DC+) - Signed by Jed Lowe MD on 9/30/2024              Review of Systems   Constitutional:  Negative for appetite change, diaphoresis, fatigue, fever, unexpected weight gain and unexpected weight loss.   HENT:  Negative for hearing loss, mouth sores, sore throat, swollen glands, trouble swallowing and voice change.    Eyes:  Negative for blurred vision.   Respiratory:  Negative for cough, shortness of breath and wheezing.    Cardiovascular:  Negative for chest pain and palpitations.   Gastrointestinal:  Negative for abdominal pain, blood in stool, constipation, diarrhea, nausea and vomiting.   Endocrine: Negative for cold intolerance and heat intolerance.   Genitourinary:  Negative for difficulty urinating, dysuria, frequency, hematuria and urinary incontinence.   Musculoskeletal:  Negative for arthralgias, back pain and myalgias.   Skin:  Negative for rash, skin lesions and wound.   Neurological:  Negative for dizziness, seizures, weakness, numbness and headache.   Hematological:  Does not bruise/bleed easily.   Psychiatric/Behavioral:  Negative for depressed mood. The patient is not nervous/anxious.    All other systems reviewed and are negative.    Current Outpatient Medications on File Prior to Visit   Medication Sig Dispense Refill    atorvastatin (LIPITOR) 10 MG tablet TAKE 1 TABLET BY MOUTH EVERY NIGHT AT BEDTIME 90 tablet 1    Continuous Blood Gluc Sensor (FreeStyle Los 3 Sensor) misc 1 each Every 14 (Fourteen) Days. 3 each 5    Emgality 120 MG/ML auto-injector pen Inject 1 mL under the skin into the appropriate area as directed 1 (One) Time.      lansoprazole (PREVACID) 30 MG capsule TAKE 1 CAPSULE BY MOUTH DAILY 90 capsule 5    lidocaine (LIDODERM) 5 % Place 1 patch on the skin as directed by provider Daily. Remove & Discard patch within 12 hours or as directed by MD (Patient not taking: Reported on 9/16/2024) 30 each 0    metoprolol  succinate XL (TOPROL-XL) 25 MG 24 hr tablet TAKE 2 TABLETS BY MOUTH DAILY (Patient taking differently: Take 1 tablet by mouth 2 (Two) Times a Day.) 180 tablet 1    oxyCODONE-acetaminophen (Percocet) 5-325 MG per tablet Take 1 tablet by mouth Every 6 (Six) Hours As Needed for Moderate Pain for up to 20 doses. (Patient not taking: Reported on 9/16/2024) 20 tablet 0    SUMAtriptan (Imitrex) 100 MG tablet Take 1 tablet by mouth As Needed for Migraine. 12 tablet 11    traZODone (DESYREL) 100 MG tablet Take 1 tablet by mouth Every Night. 90 tablet 1    [DISCONTINUED] Ozempic, 1 MG/DOSE, 4 MG/3ML solution pen-injector INJECT 1 MG UNDER THE SKIN INTO THE APPROPRIATE AREA ONCE A WEEK 3 mL 0     No current facility-administered medications on file prior to visit.       Allergies   Allergen Reactions    Dulaglutide Itching     Past Medical History:   Diagnosis Date    Abnormal ECG 08/16/2016    Abnormal Pap smear of cervix 2011    Acid reflux     Allergic rhinitis     Breast mass July 2024    Bursitis of left hip 04/06/2018    Cholelithiasis 2002    Colon polyps     Constipation     CTS (carpal tunnel syndrome) 1998    Diabetes mellitus, type 2 05/08/2019    Diarrhea     DM2 (diabetes mellitus, type 2) 05/08/2019    Fatigue     Frequent headaches     Gastroesophageal reflux     GERD (gastroesophageal reflux disease)     Gestational diabetes     Headache, tension-type 1989    History of screening mammography 02/2019 2020 scheduled    HLD (hyperlipidemia)     HPV in female 2013    Hyperlipidemia     Hypertension     Hypotension     IBS (irritable bowel syndrome)     LGSIL on Pap smear of cervix 2012    LGSIL on Pap smear of cervix 2011    Menorrhagia     Migraine     Mild dysplasia of cervix (DOMINICK I)     Mild dysplasia of cervix (DOMINICK I) 2011    Numbness and tingling     in feet    Pain of left hip joint 04/06/2018    Peripheral neuropathy     PONV (postoperative nausea and vomiting)     Screening mammogram, encounter for  02/2019    2020 SCHEDULED     Sinus trouble     Spinal headache     Tendinitis of left hip 04/06/2018    Urinary frequency     Urinary urgency      Past Surgical History:   Procedure Laterality Date    ABDOMINAL HYSTERECTOMY      BRAIN SURGERY  2019    Pituitary tumor    BREAST BIOPSY  July 2024    Left    BREAST BIOPSY Left 8/30/2024    Procedure: Left breast needle localized lumpectomy, RIGHT BACK CYST EXCISION;  Surgeon: Ana Paula Patterson MD;  Location: [facility];  Service: General;  Laterality: Left;    BREAST SURGERY  8/30/24    CARPAL TUNNEL RELEASE  1999    CHOLECYSTECTOMY      COLONOSCOPY  2020 2015 & 2020     COLONOSCOPY  2015, 2020    COLONOSCOPY N/A 06/13/2023    Procedure: COLONOSCOPY WITH COLD SNARE POLYPECTOMIES;  Surgeon: Jose David Berry MD;  Location: [facility] ENDOSCOPY;  Service: Gastroenterology;  Laterality: N/A;  COLON POLYPS    COLPOSCOPY  03/23/2011    D & C HYSTEROSCOPY ENDOMETRIAL ABLATION      ENDOMETRIAL ABLATION  2003    ENDOSCOPY  2020    HYSTERECTOMY  2004    LAPAROSCOPIC TUBAL LIGATION W/ FALOPE RING  2003    MAMMO STEREOTACTIC BREAST BIOPSY 1ST W WO DEVICE Right 2007    TONSILLECTOMY  1990    TUMOR REMOVAL  2019    transsphenoidal    UPPER GASTROINTESTINAL ENDOSCOPY  2020     Social History     Socioeconomic History    Marital status:    Tobacco Use    Smoking status: Never     Passive exposure: Never    Smokeless tobacco: Never   Vaping Use    Vaping status: Never Used   Substance and Sexual Activity    Alcohol use: Never    Drug use: Never    Sexual activity: Yes     Partners: Male     Birth control/protection: Hysterectomy, Surgical     Family History   Problem Relation Age of Onset    Hypertension Father     Heart disease Father     Diabetes Father     Arthritis Father     Liver disease Father     Cirrhosis Father     Liver cancer Father     Cancer Father         Liver    Breast cancer Mother     Arthritis Mother     Liver disease Mother     Migraines Mother   "   Neuropathy Mother     Cancer Mother         Inflammatory Breast Cancer    Kidney disease Paternal Grandfather     Heart disease Paternal Grandfather     Colon cancer Paternal Grandfather 40    Colon polyps Paternal Grandfather     Uterine cancer Maternal Grandmother     Migraines Maternal Grandmother     Migraines Maternal Aunt     Migraines Paternal Aunt     Liver cancer Other     Lung cancer Other     Ovarian cancer Neg Hx     Melanoma Neg Hx     Prostate cancer Neg Hx     Deep vein thrombosis Neg Hx        Objective   Physical Exam  Well appearing patient in no acute distress on RA  Anicteric sclerae, no rash on exposed skin  Respirations non-labored  Awake, alert, and oriented x 4. Speech intact. No gross neurologic deficit  Appropriate mood and affect    Vitals:    09/30/24 1526   BP: 104/68   Pulse: 76   Resp: 18   Temp: 97.9 °F (36.6 °C)   TempSrc: Temporal   SpO2: 96%   Weight: 67.6 kg (149 lb 0.5 oz)   Height: 157.5 cm (62.01\")   PainSc: 0-No pain               PHQ-9 Total Score:                      Result Review :   The following data was reviewed by: Jed Lowe MD on 09/30/24:  Lab Results   Component Value Date    HGB 13.8 06/17/2024    HCT 40.0 06/17/2024    MCV 90.5 06/17/2024     06/17/2024    WBC 5.44 06/17/2024    NEUTROABS 3.35 06/17/2024    LYMPHSABS 1.45 06/17/2024    MONOSABS 0.31 06/17/2024    EOSABS 0.29 06/17/2024    BASOSABS 0.03 06/17/2024     Lab Results   Component Value Date    GLUCOSE 138 (H) 06/17/2024    BUN 9 06/17/2024    CREATININE 0.80 06/17/2024     06/17/2024    K 3.8 06/17/2024     06/17/2024    CO2 26.1 06/17/2024    CALCIUM 9.1 06/17/2024    PROTEINTOT 6.3 06/17/2024    ALBUMIN 4.2 06/17/2024    BILITOT 1.4 (H) 06/17/2024    ALKPHOS 102 06/17/2024    AST 11 06/17/2024    ALT 15 06/17/2024     Lab Results   Component Value Date    MG 1.6 04/25/2019    FREET4 1.15 06/20/2023    TSH 1.470 06/20/2023     Labs personally reviewed.         Surgery " note personally reviewed.    Rad onc note personally reviewed.      No radiology results for the last 30 days.        Assessment and Plan    Diagnoses and all orders for this visit:    1. Post-menopausal (Primary)  -     DEXA Bone Density Axial; Future  -     Estradiol; Future  -     Follicle Stimulating Hormone; Future    2. Ductal carcinoma in situ (DCIS) of left breast    Other orders  -     anastrozole (ARIMIDEX) 1 MG tablet; Take 1 tablet by mouth Daily.  Dispense: 30 tablet; Refill: 1  -     Calcium Carb-Cholecalciferol (Calcium 600+D) 600-20 MG-MCG tablet; Take 1 tablet by mouth Daily.  Dispense: 90 tablet; Refill: 1        Left breast DCIS  Lumpectomy 8/30/24 with low grade DCIS. ER/AK positive. Plan for adjuvant radiation.  Due to hormone receptor positivity remaining breast tissue recommend adjuvant hormonal therapy.  Patient likely postmenopausal but will confirm with blood work.  Once postmenopausal status is confirmed, we will plan to start adjuvant aromatase inhibitor with anastrozole 1 mg daily.  Risk, benefit, side effect profile discussed.  Recommend vitamin D and calcium.  Will get baseline DEXA scan.  Return to clinic in 1 month for toxicity check.      I spent 45 minutes caring for Laila on this date of service. This time includes time spent by me in the following activities:preparing for the visit, reviewing tests, obtaining and/or reviewing a separately obtained history, performing a medically appropriate examination and/or evaluation , counseling and educating the patient/family/caregiver, ordering medications, tests, or procedures, referring and communicating with other health care professionals , documenting information in the medical record, independently interpreting results and communicating that information with the patient/family/caregiver, and care coordination    Patient Follow Up: 1 month  Patient was given instructions and counseling regarding her condition or for health maintenance  advice. Please see specific information pulled into the AVS if appropriate.

## 2024-10-01 ENCOUNTER — HOSPITAL ENCOUNTER (OUTPATIENT)
Dept: RADIATION ONCOLOGY | Facility: HOSPITAL | Age: 54
Setting detail: RADIATION/ONCOLOGY SERIES
End: 2024-10-01
Payer: COMMERCIAL

## 2024-10-03 ENCOUNTER — TELEPHONE (OUTPATIENT)
Dept: ONCOLOGY | Facility: HOSPITAL | Age: 54
End: 2024-10-03
Payer: COMMERCIAL

## 2024-10-03 ENCOUNTER — EDUCATION (OUTPATIENT)
Dept: RADIATION ONCOLOGY | Facility: HOSPITAL | Age: 54
End: 2024-10-03
Payer: COMMERCIAL

## 2024-10-03 ENCOUNTER — HOSPITAL ENCOUNTER (OUTPATIENT)
Dept: RADIATION ONCOLOGY | Facility: HOSPITAL | Age: 54
Discharge: HOME | End: 2024-10-03

## 2024-10-03 DIAGNOSIS — D05.12 INTRADUCTAL CARCINOMA IN SITU OF LEFT BREAST: ICD-10-CM

## 2024-10-03 PROCEDURE — 77332 RADIATION TREATMENT AID(S): CPT | Performed by: RADIOLOGY

## 2024-10-03 PROCEDURE — 77263 THER RADIOLOGY TX PLNG CPLX: CPT | Performed by: RADIOLOGY

## 2024-10-03 PROCEDURE — 77290 THER RAD SIMULAJ FIELD CPLX: CPT | Performed by: RADIOLOGY

## 2024-10-03 NOTE — PROGRESS NOTES
"PATIENT NAME: Laila Bell    MRN: [id number]    DX: Breast    CURRENT FRACTIONS AT TEACHING: at CT Sim    EDUCATION GIVEN:    Patient education performed today in clinic.  Education folder with handouts given:    --- Radiation Therapy contact numbers for Main Line, Nurse Line, Treatment area,  and Dietician with instruction on when to use each one.   --- Radiation Therapy packet with site specific information.   --- Radiation Therapy bulleted points  --- Radiation Oncology skin care  --- Fatigue/Energy Conservation Technique Guidelines  --- Short and Long Term Symptoms Management \"Bubble\" Sheet (Breast)    Discussed all handouts. Time given for patient to ask questions.  All questions answered to patient satisfaction.  No further needs or concerns at this time.     OTHER:   1 & 2 Cream and Aquaphor given to patient during education with instructions on when/how to use.  "

## 2024-10-09 ENCOUNTER — DOCUMENTATION (OUTPATIENT)
Dept: ONCOLOGY | Facility: HOSPITAL | Age: 54
End: 2024-10-09
Payer: COMMERCIAL

## 2024-10-09 NOTE — PROGRESS NOTES
OSW attempted to contact patient to review her high distress screening score and any barriers to care unmet needs.  OSW was able to leave a voice message with her contact information should patient need any further assistance.

## 2024-10-10 ENCOUNTER — HOSPITAL ENCOUNTER (OUTPATIENT)
Dept: RADIATION ONCOLOGY | Facility: HOSPITAL | Age: 54
Discharge: HOME | End: 2024-10-10

## 2024-10-10 LAB
RAD ONC ARIA COURSE ID: NORMAL
RAD ONC ARIA COURSE INTENT: NORMAL
RAD ONC ARIA COURSE LAST TREATMENT DATE: NORMAL
RAD ONC ARIA COURSE START DATE: NORMAL
RAD ONC ARIA COURSE TREATMENT ELAPSED DAYS: 0
RAD ONC ARIA FIRST TREATMENT DATE: NORMAL
RAD ONC ARIA PLAN FRACTIONS TREATED TO DATE: 1
RAD ONC ARIA PLAN ID: NORMAL
RAD ONC ARIA PLAN PRESCRIBED DOSE PER FRACTION: 2.66 GY
RAD ONC ARIA PLAN PRIMARY REFERENCE POINT: NORMAL
RAD ONC ARIA PLAN TOTAL FRACTIONS PRESCRIBED: 16
RAD ONC ARIA PLAN TOTAL PRESCRIBED DOSE: 4256 CGY
RAD ONC ARIA REFERENCE POINT DOSAGE GIVEN TO DATE: 2.66 GY
RAD ONC ARIA REFERENCE POINT ID: NORMAL
RAD ONC ARIA REFERENCE POINT SESSION DOSAGE GIVEN: 2.66 GY

## 2024-10-10 PROCEDURE — 77427 RADIATION TX MANAGEMENT X5: CPT | Performed by: RADIOLOGY

## 2024-10-10 PROCEDURE — 77295 3-D RADIOTHERAPY PLAN: CPT | Performed by: RADIOLOGY

## 2024-10-10 PROCEDURE — 77300 RADIATION THERAPY DOSE PLAN: CPT | Performed by: RADIOLOGY

## 2024-10-10 PROCEDURE — 77387 GUIDANCE FOR RADJ TX DLVR: CPT | Performed by: RADIOLOGY

## 2024-10-10 PROCEDURE — G6002 STEREOSCOPIC X-RAY GUIDANCE: HCPCS | Performed by: RADIOLOGY

## 2024-10-10 PROCEDURE — 77334 RADIATION TREATMENT AID(S): CPT | Performed by: RADIOLOGY

## 2024-10-10 PROCEDURE — 77412 RADIATION TX DELIVERY LVL 3: CPT | Performed by: RADIOLOGY

## 2024-10-10 PROCEDURE — 77280 THER RAD SIMULAJ FIELD SMPL: CPT | Performed by: RADIOLOGY

## 2024-10-11 ENCOUNTER — HOSPITAL ENCOUNTER (OUTPATIENT)
Dept: RADIATION ONCOLOGY | Facility: HOSPITAL | Age: 54
Discharge: HOME | End: 2024-10-11

## 2024-10-11 LAB
RAD ONC ARIA COURSE ID: NORMAL
RAD ONC ARIA COURSE INTENT: NORMAL
RAD ONC ARIA COURSE LAST TREATMENT DATE: NORMAL
RAD ONC ARIA COURSE START DATE: NORMAL
RAD ONC ARIA COURSE TREATMENT ELAPSED DAYS: 1
RAD ONC ARIA FIRST TREATMENT DATE: NORMAL
RAD ONC ARIA PLAN FRACTIONS TREATED TO DATE: 2
RAD ONC ARIA PLAN ID: NORMAL
RAD ONC ARIA PLAN PRESCRIBED DOSE PER FRACTION: 2.66 GY
RAD ONC ARIA PLAN PRIMARY REFERENCE POINT: NORMAL
RAD ONC ARIA PLAN TOTAL FRACTIONS PRESCRIBED: 16
RAD ONC ARIA PLAN TOTAL PRESCRIBED DOSE: 4256 CGY
RAD ONC ARIA REFERENCE POINT DOSAGE GIVEN TO DATE: 5.32 GY
RAD ONC ARIA REFERENCE POINT ID: NORMAL
RAD ONC ARIA REFERENCE POINT SESSION DOSAGE GIVEN: 2.66 GY

## 2024-10-11 PROCEDURE — 77412 RADIATION TX DELIVERY LVL 3: CPT | Performed by: RADIOLOGY

## 2024-10-11 PROCEDURE — 77387 GUIDANCE FOR RADJ TX DLVR: CPT | Performed by: RADIOLOGY

## 2024-10-11 PROCEDURE — G6002 STEREOSCOPIC X-RAY GUIDANCE: HCPCS | Performed by: RADIOLOGY

## 2024-10-14 ENCOUNTER — HOSPITAL ENCOUNTER (OUTPATIENT)
Dept: RADIATION ONCOLOGY | Facility: HOSPITAL | Age: 54
Discharge: HOME | End: 2024-10-14
Payer: COMMERCIAL

## 2024-10-14 LAB
RAD ONC ARIA COURSE ID: NORMAL
RAD ONC ARIA COURSE INTENT: NORMAL
RAD ONC ARIA COURSE LAST TREATMENT DATE: NORMAL
RAD ONC ARIA COURSE START DATE: NORMAL
RAD ONC ARIA COURSE TREATMENT ELAPSED DAYS: 4
RAD ONC ARIA FIRST TREATMENT DATE: NORMAL
RAD ONC ARIA PLAN FRACTIONS TREATED TO DATE: 3
RAD ONC ARIA PLAN ID: NORMAL
RAD ONC ARIA PLAN PRESCRIBED DOSE PER FRACTION: 2.66 GY
RAD ONC ARIA PLAN PRIMARY REFERENCE POINT: NORMAL
RAD ONC ARIA PLAN TOTAL FRACTIONS PRESCRIBED: 16
RAD ONC ARIA PLAN TOTAL PRESCRIBED DOSE: 4256 CGY
RAD ONC ARIA REFERENCE POINT DOSAGE GIVEN TO DATE: 7.98 GY
RAD ONC ARIA REFERENCE POINT ID: NORMAL
RAD ONC ARIA REFERENCE POINT SESSION DOSAGE GIVEN: 2.66 GY

## 2024-10-14 PROCEDURE — 77387 GUIDANCE FOR RADJ TX DLVR: CPT | Performed by: RADIOLOGY

## 2024-10-14 PROCEDURE — G6002 STEREOSCOPIC X-RAY GUIDANCE: HCPCS | Performed by: RADIOLOGY

## 2024-10-14 PROCEDURE — 77412 RADIATION TX DELIVERY LVL 3: CPT | Performed by: RADIOLOGY

## 2024-10-15 ENCOUNTER — HOSPITAL ENCOUNTER (OUTPATIENT)
Dept: RADIATION ONCOLOGY | Facility: HOSPITAL | Age: 54
Discharge: HOME | End: 2024-10-15

## 2024-10-15 VITALS
DIASTOLIC BLOOD PRESSURE: 78 MMHG | BODY MASS INDEX: 27.01 KG/M2 | HEART RATE: 75 BPM | OXYGEN SATURATION: 100 % | SYSTOLIC BLOOD PRESSURE: 114 MMHG | WEIGHT: 147.71 LBS | RESPIRATION RATE: 18 BRPM | TEMPERATURE: 98.3 F

## 2024-10-15 DIAGNOSIS — D05.12 DUCTAL CARCINOMA IN SITU (DCIS) OF LEFT BREAST: Primary | ICD-10-CM

## 2024-10-15 LAB
RAD ONC ARIA COURSE ID: NORMAL
RAD ONC ARIA COURSE INTENT: NORMAL
RAD ONC ARIA COURSE LAST TREATMENT DATE: NORMAL
RAD ONC ARIA COURSE START DATE: NORMAL
RAD ONC ARIA COURSE TREATMENT ELAPSED DAYS: 5
RAD ONC ARIA FIRST TREATMENT DATE: NORMAL
RAD ONC ARIA PLAN FRACTIONS TREATED TO DATE: 4
RAD ONC ARIA PLAN ID: NORMAL
RAD ONC ARIA PLAN PRESCRIBED DOSE PER FRACTION: 2.66 GY
RAD ONC ARIA PLAN PRIMARY REFERENCE POINT: NORMAL
RAD ONC ARIA PLAN TOTAL FRACTIONS PRESCRIBED: 16
RAD ONC ARIA PLAN TOTAL PRESCRIBED DOSE: 4256 CGY
RAD ONC ARIA REFERENCE POINT DOSAGE GIVEN TO DATE: 10.64 GY
RAD ONC ARIA REFERENCE POINT ID: NORMAL
RAD ONC ARIA REFERENCE POINT SESSION DOSAGE GIVEN: 2.66 GY

## 2024-10-15 PROCEDURE — G6002 STEREOSCOPIC X-RAY GUIDANCE: HCPCS | Performed by: RADIOLOGY

## 2024-10-15 PROCEDURE — 77412 RADIATION TX DELIVERY LVL 3: CPT | Performed by: RADIOLOGY

## 2024-10-15 PROCEDURE — 77387 GUIDANCE FOR RADJ TX DLVR: CPT | Performed by: RADIOLOGY

## 2024-10-15 NOTE — PROGRESS NOTES
On Treatment Visit       Patient: Laila Bell   YOB: 1970   Medical Record Number: [id number]     Date of Visit  October 15, 2024   Primary Diagnosis:Ductal carcinoma in situ (DCIS) of left breast [D05.12]  Cancer Staging: Cancer Staging   Ductal carcinoma in situ (DCIS) of left breast  Staging form: Breast, AJCC 8th Edition  - Pathologic: Stage Unknown (pTis (DCIS), pNX, cM0, ER+, UT+) - Signed by Jed Lowe MD on 9/30/2024         was seen today for an on treatment visit.  She is receiving radiation therapy to the left breast. She  has received 1064 cGy in 4 fractions out of a planned dose of 4256 cGy in 16 fractions.     Notices new abrasion at left lateral breast.  Recently started Arimidex and tolerating this well overall with the exception of some nausea.                                            Review of Systems:   Review of Systems   Constitutional:  Negative for appetite change and fatigue.   HENT:  Negative for sore throat and trouble swallowing.    Respiratory:  Negative for cough and shortness of breath.    Gastrointestinal:  Positive for nausea (occasional mild). Negative for constipation and diarrhea.   Endocrine: Positive for heat intolerance.   Genitourinary:  Negative for difficulty urinating, dysuria, frequency and urgency.   Musculoskeletal:  Negative for arthralgias and back pain.   Skin:  Positive for wound (reddened area noted to tx area). Negative for color change.   Neurological:  Negative for dizziness and headaches.   Psychiatric/Behavioral:  Negative for sleep disturbance.        Vitals:     Vitals:    10/15/24 1545   BP: 114/78   Pulse: 75   Resp: 18   Temp: 98.3 °F (36.8 °C)   SpO2: 100%       Weight:   Wt Readings from Last 3 Encounters:   10/15/24 67 kg (147 lb 11.3 oz)   09/30/24 67.6 kg (149 lb 0.5 oz)   09/16/24 67.4 kg (148 lb 9.4 oz)      Pain:    Pain Score    10/15/24 1545   PainSc: 0-No pain         Physical Exam:  Gen: WD/WN;  NAD  HEENT: MMM  Trachea: midline  Chest: symmetric; approximate 2.5 mm abrasion left lateral breast; no other skin changes  Resp: normal respiratory effort  Extr: warm, well-perfused  Neuro: awake and alert; no aphasia or neglect    Plan: I have reviewed treatment setup notes, checked and approved the daily guidance images.  I reviewed dose delivery, treatment parameters and deemed them appropriate. We plan to continue radiation therapy as prescribed.          Radiation Oncology    Electronically signed by Vadim Perdue MD 10/15/2024  16:05 EDT

## 2024-10-16 ENCOUNTER — HOSPITAL ENCOUNTER (OUTPATIENT)
Dept: RADIATION ONCOLOGY | Facility: HOSPITAL | Age: 54
Discharge: HOME | End: 2024-10-16

## 2024-10-16 LAB
RAD ONC ARIA COURSE ID: NORMAL
RAD ONC ARIA COURSE INTENT: NORMAL
RAD ONC ARIA COURSE LAST TREATMENT DATE: NORMAL
RAD ONC ARIA COURSE START DATE: NORMAL
RAD ONC ARIA COURSE TREATMENT ELAPSED DAYS: 6
RAD ONC ARIA FIRST TREATMENT DATE: NORMAL
RAD ONC ARIA PLAN FRACTIONS TREATED TO DATE: 5
RAD ONC ARIA PLAN ID: NORMAL
RAD ONC ARIA PLAN PRESCRIBED DOSE PER FRACTION: 2.66 GY
RAD ONC ARIA PLAN PRIMARY REFERENCE POINT: NORMAL
RAD ONC ARIA PLAN TOTAL FRACTIONS PRESCRIBED: 16
RAD ONC ARIA PLAN TOTAL PRESCRIBED DOSE: 4256 CGY
RAD ONC ARIA REFERENCE POINT DOSAGE GIVEN TO DATE: 13.3 GY
RAD ONC ARIA REFERENCE POINT ID: NORMAL
RAD ONC ARIA REFERENCE POINT SESSION DOSAGE GIVEN: 2.66 GY

## 2024-10-16 PROCEDURE — 77412 RADIATION TX DELIVERY LVL 3: CPT | Performed by: RADIOLOGY

## 2024-10-16 PROCEDURE — 77336 RADIATION PHYSICS CONSULT: CPT | Performed by: RADIOLOGY

## 2024-10-17 ENCOUNTER — HOSPITAL ENCOUNTER (OUTPATIENT)
Dept: RADIATION ONCOLOGY | Facility: HOSPITAL | Age: 54
Discharge: HOME | End: 2024-10-17

## 2024-10-17 LAB
RAD ONC ARIA COURSE ID: NORMAL
RAD ONC ARIA COURSE INTENT: NORMAL
RAD ONC ARIA COURSE LAST TREATMENT DATE: NORMAL
RAD ONC ARIA COURSE START DATE: NORMAL
RAD ONC ARIA COURSE TREATMENT ELAPSED DAYS: 7
RAD ONC ARIA FIRST TREATMENT DATE: NORMAL
RAD ONC ARIA PLAN FRACTIONS TREATED TO DATE: 6
RAD ONC ARIA PLAN ID: NORMAL
RAD ONC ARIA PLAN PRESCRIBED DOSE PER FRACTION: 2.66 GY
RAD ONC ARIA PLAN PRIMARY REFERENCE POINT: NORMAL
RAD ONC ARIA PLAN TOTAL FRACTIONS PRESCRIBED: 16
RAD ONC ARIA PLAN TOTAL PRESCRIBED DOSE: 4256 CGY
RAD ONC ARIA REFERENCE POINT DOSAGE GIVEN TO DATE: 15.96 GY
RAD ONC ARIA REFERENCE POINT ID: NORMAL
RAD ONC ARIA REFERENCE POINT SESSION DOSAGE GIVEN: 2.66 GY

## 2024-10-17 PROCEDURE — 77412 RADIATION TX DELIVERY LVL 3: CPT | Performed by: RADIOLOGY

## 2024-10-17 PROCEDURE — G6002 STEREOSCOPIC X-RAY GUIDANCE: HCPCS | Performed by: RADIOLOGY

## 2024-10-17 PROCEDURE — 77387 GUIDANCE FOR RADJ TX DLVR: CPT | Performed by: RADIOLOGY

## 2024-10-18 ENCOUNTER — HOSPITAL ENCOUNTER (OUTPATIENT)
Dept: RADIATION ONCOLOGY | Facility: HOSPITAL | Age: 54
Discharge: HOME | End: 2024-10-18

## 2024-10-18 ENCOUNTER — DOCUMENTATION (OUTPATIENT)
Dept: RADIATION ONCOLOGY | Facility: HOSPITAL | Age: 54
End: 2024-10-18
Payer: COMMERCIAL

## 2024-10-18 LAB
RAD ONC ARIA COURSE ID: NORMAL
RAD ONC ARIA COURSE INTENT: NORMAL
RAD ONC ARIA COURSE LAST TREATMENT DATE: NORMAL
RAD ONC ARIA COURSE START DATE: NORMAL
RAD ONC ARIA COURSE TREATMENT ELAPSED DAYS: 8
RAD ONC ARIA FIRST TREATMENT DATE: NORMAL
RAD ONC ARIA PLAN FRACTIONS TREATED TO DATE: 7
RAD ONC ARIA PLAN ID: NORMAL
RAD ONC ARIA PLAN PRESCRIBED DOSE PER FRACTION: 2.66 GY
RAD ONC ARIA PLAN PRIMARY REFERENCE POINT: NORMAL
RAD ONC ARIA PLAN TOTAL FRACTIONS PRESCRIBED: 16
RAD ONC ARIA PLAN TOTAL PRESCRIBED DOSE: 4256 CGY
RAD ONC ARIA REFERENCE POINT DOSAGE GIVEN TO DATE: 18.62 GY
RAD ONC ARIA REFERENCE POINT ID: NORMAL
RAD ONC ARIA REFERENCE POINT SESSION DOSAGE GIVEN: 2.66 GY

## 2024-10-18 PROCEDURE — 77412 RADIATION TX DELIVERY LVL 3: CPT | Performed by: RADIOLOGY

## 2024-10-18 PROCEDURE — G6002 STEREOSCOPIC X-RAY GUIDANCE: HCPCS | Performed by: RADIOLOGY

## 2024-10-18 PROCEDURE — 77387 GUIDANCE FOR RADJ TX DLVR: CPT | Performed by: RADIOLOGY
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
## 2024-10-21 ENCOUNTER — HOSPITAL ENCOUNTER (OUTPATIENT)
Dept: RADIATION ONCOLOGY | Facility: HOSPITAL | Age: 54
Discharge: HOME | End: 2024-10-21
Payer: COMMERCIAL

## 2024-10-21 LAB
RAD ONC ARIA COURSE ID: NORMAL
RAD ONC ARIA COURSE INTENT: NORMAL
RAD ONC ARIA COURSE LAST TREATMENT DATE: NORMAL
RAD ONC ARIA COURSE START DATE: NORMAL
RAD ONC ARIA COURSE TREATMENT ELAPSED DAYS: 11
RAD ONC ARIA FIRST TREATMENT DATE: NORMAL
RAD ONC ARIA PLAN FRACTIONS TREATED TO DATE: 8
RAD ONC ARIA PLAN ID: NORMAL
RAD ONC ARIA PLAN PRESCRIBED DOSE PER FRACTION: 2.66 GY
RAD ONC ARIA PLAN PRIMARY REFERENCE POINT: NORMAL
RAD ONC ARIA PLAN TOTAL FRACTIONS PRESCRIBED: 16
RAD ONC ARIA PLAN TOTAL PRESCRIBED DOSE: 4256 CGY
RAD ONC ARIA REFERENCE POINT DOSAGE GIVEN TO DATE: 21.28 GY
RAD ONC ARIA REFERENCE POINT ID: NORMAL
RAD ONC ARIA REFERENCE POINT SESSION DOSAGE GIVEN: 2.66 GY

## 2024-10-21 PROCEDURE — 77387 GUIDANCE FOR RADJ TX DLVR: CPT | Performed by: RADIOLOGY

## 2024-10-21 PROCEDURE — G6002 STEREOSCOPIC X-RAY GUIDANCE: HCPCS | Performed by: RADIOLOGY

## 2024-10-21 PROCEDURE — 77412 RADIATION TX DELIVERY LVL 3: CPT | Performed by: RADIOLOGY

## 2024-10-22 ENCOUNTER — HOSPITAL ENCOUNTER (OUTPATIENT)
Dept: RADIATION ONCOLOGY | Facility: HOSPITAL | Age: 54
Discharge: HOME | End: 2024-10-22

## 2024-10-22 VITALS
DIASTOLIC BLOOD PRESSURE: 80 MMHG | BODY MASS INDEX: 27.17 KG/M2 | HEART RATE: 71 BPM | TEMPERATURE: 97.5 F | WEIGHT: 148.59 LBS | OXYGEN SATURATION: 100 % | RESPIRATION RATE: 16 BRPM | SYSTOLIC BLOOD PRESSURE: 114 MMHG

## 2024-10-22 DIAGNOSIS — D05.12 DUCTAL CARCINOMA IN SITU (DCIS) OF LEFT BREAST: Primary | ICD-10-CM

## 2024-10-22 LAB
RAD ONC ARIA COURSE ID: NORMAL
RAD ONC ARIA COURSE INTENT: NORMAL
RAD ONC ARIA COURSE LAST TREATMENT DATE: NORMAL
RAD ONC ARIA COURSE START DATE: NORMAL
RAD ONC ARIA COURSE TREATMENT ELAPSED DAYS: 12
RAD ONC ARIA FIRST TREATMENT DATE: NORMAL
RAD ONC ARIA PLAN FRACTIONS TREATED TO DATE: 9
RAD ONC ARIA PLAN ID: NORMAL
RAD ONC ARIA PLAN PRESCRIBED DOSE PER FRACTION: 2.66 GY
RAD ONC ARIA PLAN PRIMARY REFERENCE POINT: NORMAL
RAD ONC ARIA PLAN TOTAL FRACTIONS PRESCRIBED: 16
RAD ONC ARIA PLAN TOTAL PRESCRIBED DOSE: 4256 CGY
RAD ONC ARIA REFERENCE POINT DOSAGE GIVEN TO DATE: 23.94 GY
RAD ONC ARIA REFERENCE POINT ID: NORMAL
RAD ONC ARIA REFERENCE POINT SESSION DOSAGE GIVEN: 2.66 GY

## 2024-10-22 PROCEDURE — 77412 RADIATION TX DELIVERY LVL 3: CPT | Performed by: RADIOLOGY

## 2024-10-22 PROCEDURE — G6002 STEREOSCOPIC X-RAY GUIDANCE: HCPCS | Performed by: RADIOLOGY

## 2024-10-22 PROCEDURE — 77387 GUIDANCE FOR RADJ TX DLVR: CPT | Performed by: RADIOLOGY

## 2024-10-22 NOTE — PROGRESS NOTES
On Treatment Visit       Patient: Laila Bell   YOB: 1970   Medical Record Number: [id number]     Date of Visit  October 22, 2024   Primary Diagnosis:Ductal carcinoma in situ (DCIS) of left breast [D05.12]  Cancer Staging: Cancer Staging   Ductal carcinoma in situ (DCIS) of left breast  Staging form: Breast, AJCC 8th Edition  - Pathologic: Stage Unknown (pTis (DCIS), pNX, cM0, ER+, TN+) - Signed by Jed Lowe MD on 9/30/2024         was seen today for an on treatment visit.  She is receiving radiation therapy to the left breast. She  has received 2394 cGy in 9 fractions out of a planned dose of 4256 cGy in 16 fractions.     No changes since last evaluated.  Experiencing some constipation which is chronic for her                                            Review of Systems:   Review of Systems   Constitutional:  Negative for appetite change and fatigue.   HENT:  Negative for sore throat and trouble swallowing.    Respiratory:  Negative for cough and shortness of breath.    Gastrointestinal:  Positive for constipation. Negative for diarrhea, nausea and vomiting.   Endocrine: Positive for heat intolerance.   Genitourinary:  Negative for difficulty urinating, dysuria, frequency and urgency.   Musculoskeletal:  Negative for arthralgias and back pain.   Skin:  Positive for wound (reddened area noted to tx area). Negative for color change.   Neurological:  Negative for dizziness and headaches.   Psychiatric/Behavioral:  Negative for sleep disturbance.        Vitals:     Vitals:    10/22/24 1548   BP: 114/80   Pulse: 71   Resp: 16   Temp: 97.5 °F (36.4 °C)   SpO2: 100%       Weight:   Wt Readings from Last 3 Encounters:   10/22/24 67.4 kg (148 lb 9.4 oz)   10/15/24 67 kg (147 lb 11.3 oz)   09/30/24 67.6 kg (149 lb 0.5 oz)      Pain:    Pain Score    10/22/24 1548   PainSc: 0-No pain         Physical Exam:  Gen: WD/WN; NAD  HEENT: MMM  Trachea: midline  Chest: symmetric  Resp:  normal respiratory effort  Extr: warm, well-perfused  Neuro: awake and alert; no aphasia or neglect    Plan: I have reviewed treatment setup notes, checked and approved the daily guidance images.  I reviewed dose delivery, treatment parameters and deemed them appropriate. We plan to continue radiation therapy as prescribed.          Radiation Oncology    Electronically signed by Vadim Perdue MD 10/22/2024  16:00 EDT

## 2024-10-23 ENCOUNTER — HOSPITAL ENCOUNTER (OUTPATIENT)
Dept: RADIATION ONCOLOGY | Facility: HOSPITAL | Age: 54
Discharge: HOME | End: 2024-10-23

## 2024-10-23 LAB
RAD ONC ARIA COURSE ID: NORMAL
RAD ONC ARIA COURSE INTENT: NORMAL
RAD ONC ARIA COURSE LAST TREATMENT DATE: NORMAL
RAD ONC ARIA COURSE START DATE: NORMAL
RAD ONC ARIA COURSE TREATMENT ELAPSED DAYS: 13
RAD ONC ARIA FIRST TREATMENT DATE: NORMAL
RAD ONC ARIA PLAN FRACTIONS TREATED TO DATE: 10
RAD ONC ARIA PLAN ID: NORMAL
RAD ONC ARIA PLAN PRESCRIBED DOSE PER FRACTION: 2.66 GY
RAD ONC ARIA PLAN PRIMARY REFERENCE POINT: NORMAL
RAD ONC ARIA PLAN TOTAL FRACTIONS PRESCRIBED: 16
RAD ONC ARIA PLAN TOTAL PRESCRIBED DOSE: 4256 CGY
RAD ONC ARIA REFERENCE POINT DOSAGE GIVEN TO DATE: 26.6 GY
RAD ONC ARIA REFERENCE POINT ID: NORMAL
RAD ONC ARIA REFERENCE POINT SESSION DOSAGE GIVEN: 2.66 GY

## 2024-10-23 PROCEDURE — 77387 GUIDANCE FOR RADJ TX DLVR: CPT | Performed by: RADIOLOGY

## 2024-10-23 PROCEDURE — G6002 STEREOSCOPIC X-RAY GUIDANCE: HCPCS | Performed by: RADIOLOGY

## 2024-10-23 PROCEDURE — 77412 RADIATION TX DELIVERY LVL 3: CPT | Performed by: RADIOLOGY

## 2024-10-24 ENCOUNTER — HOSPITAL ENCOUNTER (OUTPATIENT)
Dept: RADIATION ONCOLOGY | Facility: HOSPITAL | Age: 54
Discharge: HOME | End: 2024-10-24

## 2024-10-24 LAB
RAD ONC ARIA COURSE ID: NORMAL
RAD ONC ARIA COURSE INTENT: NORMAL
RAD ONC ARIA COURSE LAST TREATMENT DATE: NORMAL
RAD ONC ARIA COURSE START DATE: NORMAL
RAD ONC ARIA COURSE TREATMENT ELAPSED DAYS: 14
RAD ONC ARIA FIRST TREATMENT DATE: NORMAL
RAD ONC ARIA PLAN FRACTIONS TREATED TO DATE: 11
RAD ONC ARIA PLAN ID: NORMAL
RAD ONC ARIA PLAN PRESCRIBED DOSE PER FRACTION: 2.66 GY
RAD ONC ARIA PLAN PRIMARY REFERENCE POINT: NORMAL
RAD ONC ARIA PLAN TOTAL FRACTIONS PRESCRIBED: 16
RAD ONC ARIA PLAN TOTAL PRESCRIBED DOSE: 4256 CGY
RAD ONC ARIA REFERENCE POINT DOSAGE GIVEN TO DATE: 29.26 GY
RAD ONC ARIA REFERENCE POINT ID: NORMAL
RAD ONC ARIA REFERENCE POINT SESSION DOSAGE GIVEN: 2.66 GY

## 2024-10-24 PROCEDURE — 77412 RADIATION TX DELIVERY LVL 3: CPT | Performed by: RADIOLOGY

## 2024-10-24 PROCEDURE — G6002 STEREOSCOPIC X-RAY GUIDANCE: HCPCS | Performed by: RADIOLOGY

## 2024-10-24 PROCEDURE — 77387 GUIDANCE FOR RADJ TX DLVR: CPT | Performed by: RADIOLOGY

## 2024-10-25 ENCOUNTER — HOSPITAL ENCOUNTER (OUTPATIENT)
Dept: RADIATION ONCOLOGY | Facility: HOSPITAL | Age: 54
Discharge: HOME | End: 2024-10-25

## 2024-10-25 LAB
RAD ONC ARIA COURSE ID: NORMAL
RAD ONC ARIA COURSE INTENT: NORMAL
RAD ONC ARIA COURSE LAST TREATMENT DATE: NORMAL
RAD ONC ARIA COURSE START DATE: NORMAL
RAD ONC ARIA COURSE TREATMENT ELAPSED DAYS: 15
RAD ONC ARIA FIRST TREATMENT DATE: NORMAL
RAD ONC ARIA PLAN FRACTIONS TREATED TO DATE: 12
RAD ONC ARIA PLAN ID: NORMAL
RAD ONC ARIA PLAN PRESCRIBED DOSE PER FRACTION: 2.66 GY
RAD ONC ARIA PLAN PRIMARY REFERENCE POINT: NORMAL
RAD ONC ARIA PLAN TOTAL FRACTIONS PRESCRIBED: 16
RAD ONC ARIA PLAN TOTAL PRESCRIBED DOSE: 4256 CGY
RAD ONC ARIA REFERENCE POINT DOSAGE GIVEN TO DATE: 31.92 GY
RAD ONC ARIA REFERENCE POINT ID: NORMAL
RAD ONC ARIA REFERENCE POINT SESSION DOSAGE GIVEN: 2.66 GY

## 2024-10-25 PROCEDURE — G6002 STEREOSCOPIC X-RAY GUIDANCE: HCPCS | Performed by: RADIOLOGY

## 2024-10-25 PROCEDURE — 77412 RADIATION TX DELIVERY LVL 3: CPT | Performed by: RADIOLOGY

## 2024-10-25 PROCEDURE — 77387 GUIDANCE FOR RADJ TX DLVR: CPT | Performed by: RADIOLOGY

## 2024-10-28 ENCOUNTER — HOSPITAL ENCOUNTER (OUTPATIENT)
Dept: RADIATION ONCOLOGY | Facility: HOSPITAL | Age: 54
Discharge: HOME | End: 2024-10-28
Payer: COMMERCIAL

## 2024-10-28 DIAGNOSIS — E11.65 TYPE 2 DIABETES MELLITUS WITH HYPERGLYCEMIA, WITHOUT LONG-TERM CURRENT USE OF INSULIN: ICD-10-CM

## 2024-10-28 LAB
RAD ONC ARIA COURSE ID: NORMAL
RAD ONC ARIA COURSE INTENT: NORMAL
RAD ONC ARIA COURSE LAST TREATMENT DATE: NORMAL
RAD ONC ARIA COURSE START DATE: NORMAL
RAD ONC ARIA COURSE TREATMENT ELAPSED DAYS: 18
RAD ONC ARIA FIRST TREATMENT DATE: NORMAL
RAD ONC ARIA PLAN FRACTIONS TREATED TO DATE: 13
RAD ONC ARIA PLAN ID: NORMAL
RAD ONC ARIA PLAN PRESCRIBED DOSE PER FRACTION: 2.66 GY
RAD ONC ARIA PLAN PRIMARY REFERENCE POINT: NORMAL
RAD ONC ARIA PLAN TOTAL FRACTIONS PRESCRIBED: 16
RAD ONC ARIA PLAN TOTAL PRESCRIBED DOSE: 4256 CGY
RAD ONC ARIA REFERENCE POINT DOSAGE GIVEN TO DATE: 34.58 GY
RAD ONC ARIA REFERENCE POINT ID: NORMAL
RAD ONC ARIA REFERENCE POINT SESSION DOSAGE GIVEN: 2.66 GY

## 2024-10-28 PROCEDURE — G6002 STEREOSCOPIC X-RAY GUIDANCE: HCPCS | Performed by: RADIOLOGY

## 2024-10-28 PROCEDURE — 77387 GUIDANCE FOR RADJ TX DLVR: CPT | Performed by: RADIOLOGY

## 2024-10-28 PROCEDURE — 77412 RADIATION TX DELIVERY LVL 3: CPT | Performed by: RADIOLOGY

## 2024-10-28 RX ORDER — SEMAGLUTIDE 1.34 MG/ML
INJECTION, SOLUTION SUBCUTANEOUS
Qty: 3 ML | Refills: 0 | Status: SHIPPED | OUTPATIENT
Start: 2024-10-28

## 2024-10-29 ENCOUNTER — HOSPITAL ENCOUNTER (OUTPATIENT)
Dept: RADIATION ONCOLOGY | Facility: HOSPITAL | Age: 54
Discharge: HOME | End: 2024-10-29

## 2024-10-29 VITALS
WEIGHT: 146.83 LBS | OXYGEN SATURATION: 100 % | RESPIRATION RATE: 16 BRPM | BODY MASS INDEX: 26.85 KG/M2 | HEART RATE: 82 BPM | TEMPERATURE: 97.1 F | DIASTOLIC BLOOD PRESSURE: 73 MMHG | SYSTOLIC BLOOD PRESSURE: 118 MMHG

## 2024-10-29 DIAGNOSIS — D05.12 DUCTAL CARCINOMA IN SITU (DCIS) OF LEFT BREAST: Primary | ICD-10-CM

## 2024-10-29 LAB
RAD ONC ARIA COURSE ID: NORMAL
RAD ONC ARIA COURSE INTENT: NORMAL
RAD ONC ARIA COURSE LAST TREATMENT DATE: NORMAL
RAD ONC ARIA COURSE START DATE: NORMAL
RAD ONC ARIA COURSE TREATMENT ELAPSED DAYS: 19
RAD ONC ARIA FIRST TREATMENT DATE: NORMAL
RAD ONC ARIA PLAN FRACTIONS TREATED TO DATE: 14
RAD ONC ARIA PLAN ID: NORMAL
RAD ONC ARIA PLAN PRESCRIBED DOSE PER FRACTION: 2.66 GY
RAD ONC ARIA PLAN PRIMARY REFERENCE POINT: NORMAL
RAD ONC ARIA PLAN TOTAL FRACTIONS PRESCRIBED: 16
RAD ONC ARIA PLAN TOTAL PRESCRIBED DOSE: 4256 CGY
RAD ONC ARIA REFERENCE POINT DOSAGE GIVEN TO DATE: 37.24 GY
RAD ONC ARIA REFERENCE POINT ID: NORMAL
RAD ONC ARIA REFERENCE POINT SESSION DOSAGE GIVEN: 2.66 GY

## 2024-10-29 PROCEDURE — 77387 GUIDANCE FOR RADJ TX DLVR: CPT | Performed by: RADIOLOGY

## 2024-10-29 PROCEDURE — 77412 RADIATION TX DELIVERY LVL 3: CPT | Performed by: RADIOLOGY

## 2024-10-29 NOTE — PROGRESS NOTES
On Treatment Visit       Patient: Laila Bell   YOB: 1970   Medical Record Number: [id number]     Date of Visit  October 29, 2024   Primary Diagnosis:Ductal carcinoma in situ (DCIS) of left breast [D05.12]  Cancer Staging: Cancer Staging   Ductal carcinoma in situ (DCIS) of left breast  Staging form: Breast, AJCC 8th Edition  - Pathologic: Stage Unknown (pTis (DCIS), pNX, cM0, ER+, AK+) - Signed by Jed Lowe MD on 9/30/2024         was seen today for an on treatment visit.  She is receiving radiation therapy to the left breast. She  has received 3724 cGy in 14 fractions out of a planned dose of 4256 cGy in 16 fractions.     Doing well overall.  Reports development of left breast rash                                            Review of Systems:   Review of Systems   Constitutional:  Positive for appetite change (decreasing). Negative for fatigue.   HENT:  Negative for congestion, sore throat and trouble swallowing.    Respiratory:  Negative for cough and shortness of breath.    Gastrointestinal:  Negative for constipation, diarrhea, nausea and vomiting.   Endocrine: Positive for heat intolerance.   Genitourinary:  Negative for difficulty urinating, dysuria, frequency and urgency.   Musculoskeletal:  Negative for arthralgias and back pain.   Skin:  Positive for rash and wound (reddened area noted to tx area). Negative for color change.   Neurological:  Negative for dizziness and headaches.   Psychiatric/Behavioral:  Negative for sleep disturbance.        Vitals:     Vitals:    10/29/24 1553   BP: 118/73   Pulse: 82   Resp: 16   Temp: 97.1 °F (36.2 °C)   SpO2: 100%       Weight:   Wt Readings from Last 3 Encounters:   10/29/24 66.6 kg (146 lb 13.2 oz)   10/22/24 67.4 kg (148 lb 9.4 oz)   10/15/24 67 kg (147 lb 11.3 oz)      Pain:    Pain Score    10/29/24 1553   PainSc: 0-No pain         Physical Exam:  Gen: WD/WN; NAD  HEENT: MMM  Trachea: midline  Chest: symmetric  Resp:  normal respiratory effort  Extr: warm, well-perfused  Neuro: awake and alert; no aphasia or neglect    Plan: I have reviewed treatment setup notes, checked and approved the daily guidance images.  I reviewed dose delivery, treatment parameters and deemed them appropriate. We plan to continue radiation therapy as prescribed.    Reevaluate 6 weeks after completion of radiotherapy      Radiation Oncology    Electronically signed by Vadim Perdue MD 10/29/2024  16:17 EDT

## 2024-10-30 ENCOUNTER — HOSPITAL ENCOUNTER (OUTPATIENT)
Dept: BONE DENSITY | Facility: HOSPITAL | Age: 54
Discharge: HOME | End: 2024-10-30
Admitting: INTERNAL MEDICINE
Payer: COMMERCIAL

## 2024-10-30 ENCOUNTER — HOSPITAL ENCOUNTER (OUTPATIENT)
Dept: RADIATION ONCOLOGY | Facility: HOSPITAL | Age: 54
Discharge: HOME | End: 2024-10-30

## 2024-10-30 DIAGNOSIS — Z78.0 POST-MENOPAUSAL: ICD-10-CM

## 2024-10-30 LAB
RAD ONC ARIA COURSE ID: NORMAL
RAD ONC ARIA COURSE INTENT: NORMAL
RAD ONC ARIA COURSE LAST TREATMENT DATE: NORMAL
RAD ONC ARIA COURSE START DATE: NORMAL
RAD ONC ARIA COURSE TREATMENT ELAPSED DAYS: 20
RAD ONC ARIA FIRST TREATMENT DATE: NORMAL
RAD ONC ARIA PLAN FRACTIONS TREATED TO DATE: 15
RAD ONC ARIA PLAN ID: NORMAL
RAD ONC ARIA PLAN PRESCRIBED DOSE PER FRACTION: 2.66 GY
RAD ONC ARIA PLAN PRIMARY REFERENCE POINT: NORMAL
RAD ONC ARIA PLAN TOTAL FRACTIONS PRESCRIBED: 16
RAD ONC ARIA PLAN TOTAL PRESCRIBED DOSE: 4256 CGY
RAD ONC ARIA REFERENCE POINT DOSAGE GIVEN TO DATE: 39.9 GY
RAD ONC ARIA REFERENCE POINT ID: NORMAL
RAD ONC ARIA REFERENCE POINT SESSION DOSAGE GIVEN: 2.66 GY

## 2024-10-30 PROCEDURE — 77387 GUIDANCE FOR RADJ TX DLVR: CPT | Performed by: RADIOLOGY

## 2024-10-30 PROCEDURE — 77080 DXA BONE DENSITY AXIAL: CPT

## 2024-10-30 PROCEDURE — 77336 RADIATION PHYSICS CONSULT: CPT | Performed by: RADIOLOGY

## 2024-10-30 PROCEDURE — 77412 RADIATION TX DELIVERY LVL 3: CPT | Performed by: RADIOLOGY

## 2024-10-31 ENCOUNTER — HOSPITAL ENCOUNTER (OUTPATIENT)
Dept: RADIATION ONCOLOGY | Facility: HOSPITAL | Age: 54
Discharge: HOME | End: 2024-10-31

## 2024-10-31 LAB
RAD ONC ARIA COURSE ID: NORMAL
RAD ONC ARIA COURSE INTENT: NORMAL
RAD ONC ARIA COURSE LAST TREATMENT DATE: NORMAL
RAD ONC ARIA COURSE START DATE: NORMAL
RAD ONC ARIA COURSE TREATMENT ELAPSED DAYS: 21
RAD ONC ARIA FIRST TREATMENT DATE: NORMAL
RAD ONC ARIA PLAN FRACTIONS TREATED TO DATE: 16
RAD ONC ARIA PLAN ID: NORMAL
RAD ONC ARIA PLAN PRESCRIBED DOSE PER FRACTION: 2.66 GY
RAD ONC ARIA PLAN PRIMARY REFERENCE POINT: NORMAL
RAD ONC ARIA PLAN TOTAL FRACTIONS PRESCRIBED: 16
RAD ONC ARIA PLAN TOTAL PRESCRIBED DOSE: 4256 CGY
RAD ONC ARIA REFERENCE POINT DOSAGE GIVEN TO DATE: 42.56 GY
RAD ONC ARIA REFERENCE POINT ID: NORMAL
RAD ONC ARIA REFERENCE POINT SESSION DOSAGE GIVEN: 2.66 GY

## 2024-10-31 PROCEDURE — 77387 GUIDANCE FOR RADJ TX DLVR: CPT | Performed by: RADIOLOGY

## 2024-10-31 PROCEDURE — 77412 RADIATION TX DELIVERY LVL 3: CPT | Performed by: RADIOLOGY

## 2024-11-04 RX ORDER — ANASTROZOLE 1 MG/1
1 TABLET ORAL DAILY
Qty: 90 TABLET | OUTPATIENT
Start: 2024-11-04

## 2024-11-07 ENCOUNTER — OFFICE VISIT (OUTPATIENT)
Dept: SURGERY | Facility: CLINIC | Age: 54
End: 2024-11-07
Payer: COMMERCIAL

## 2024-11-07 ENCOUNTER — OFFICE VISIT (OUTPATIENT)
Dept: ONCOLOGY | Facility: HOSPITAL | Age: 54
End: 2024-11-07
Payer: COMMERCIAL

## 2024-11-07 VITALS — RESPIRATION RATE: 18 BRPM | WEIGHT: 147 LBS | BODY MASS INDEX: 27.05 KG/M2 | HEIGHT: 62 IN

## 2024-11-07 VITALS
BODY MASS INDEX: 27.05 KG/M2 | HEART RATE: 75 BPM | WEIGHT: 147.93 LBS | DIASTOLIC BLOOD PRESSURE: 79 MMHG | TEMPERATURE: 98 F | RESPIRATION RATE: 18 BRPM | SYSTOLIC BLOOD PRESSURE: 115 MMHG | OXYGEN SATURATION: 98 %

## 2024-11-07 DIAGNOSIS — R11.0 NAUSEA: ICD-10-CM

## 2024-11-07 DIAGNOSIS — T45.1X5A HOT FLASHES RELATED TO AROMATASE INHIBITOR THERAPY: ICD-10-CM

## 2024-11-07 DIAGNOSIS — D05.12 DUCTAL CARCINOMA IN SITU (DCIS) OF LEFT BREAST: Primary | ICD-10-CM

## 2024-11-07 DIAGNOSIS — G47.01 INSOMNIA DUE TO MEDICAL CONDITION: ICD-10-CM

## 2024-11-07 DIAGNOSIS — R23.2 HOT FLASHES RELATED TO AROMATASE INHIBITOR THERAPY: ICD-10-CM

## 2024-11-07 PROCEDURE — 99024 POSTOP FOLLOW-UP VISIT: CPT | Performed by: SURGERY

## 2024-11-07 RX ORDER — ONDANSETRON 8 MG/1
8 TABLET, FILM COATED ORAL EVERY 8 HOURS PRN
Qty: 30 TABLET | Refills: 1 | Status: SHIPPED | OUTPATIENT
Start: 2024-11-07

## 2024-11-07 RX ORDER — ANASTROZOLE 1 MG/1
1 TABLET ORAL DAILY
Qty: 90 TABLET | Refills: 1 | Status: SHIPPED | OUTPATIENT
Start: 2024-11-07

## 2024-11-07 NOTE — PROGRESS NOTES
Chief Complaint  FOLLOW UP 2    Mychal Calvillo APRN Payne, Chandra, APRN Subjective Traci Gardenia Bell presents to [facility] for DCIS    Patient is a very pleasant 53-year-old female with past medical history of allergic rhinitis, gastroesophageal reflux disease, hypertension, hyperlipidemia, irritable bowel syndrome who presents for oncology follow up regarding DCIS of the left breast.  She underwent MRI of the breast for history of dense breast tissue 7/2/2024 revealed a 0.8 cm enhancing mass in the central left breast, 6 cm from the nipple.  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  She underwent lumpectomy on 8/30/2024 revealing low-grade DCIS measuring 6 mm in greatest dimension with negative margins, ER positive/AK positive.      Interval History  Patient is here for follow up.  She has completed radiation.  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  Worse in the left axilla.  She is using topical therapies.  She has been started on anastrozole.  She has been on it over a month.  She has had nausea with this.  Nausea is slowly improving.  She reports waking up often at night.  Having trouble to get back to sleep.  She reports hot flashes are similar in frequency to before but may be more severe in intensity.  She also has noted more headaches. DEXA normal.     Oncology/Hematology History Overview Note   7/2/24: MRI breast (screening due to family history breast cancer)  revealed a 0.8 cm enhancing mass in the central left breast, 6 cm from the nipple.      7/24/24: Pathology from MRI guided core needle biopsy revealed atypical ductal hyperplasia with an intraductal papilloma.      8/30/24: Left breast lumpectomy with pathology revealing low-grade DCIS measuring 6 mm in greatest dimension with negative margins, ER positive/AK positive.      9/30/24: Orders written for  anastrozole 1 mg daily.     Adjuvant radiation planned.         Ductal carcinoma in situ (DCIS) of left breast   9/30/2024 Initial Diagnosis    Ductal carcinoma in situ (DCIS) of left breast     9/30/2024 Cancer Staged    Staging form: Breast, AJCC 8th Edition  - Pathologic: Stage Unknown (pTis (DCIS), pNX, cM0, ER+, PA+) - Signed by Jed Lowe MD on 9/30/2024     Intraductal carcinoma in situ of left breast   10/3/2024 Initial Diagnosis    Intraductal carcinoma in situ of left breast     10/3/2024 -  Radiation    RADIATION THERAPY Treatment Details (Noted on 10/3/2024)  Site: Left Breast  Technique: 3D CRT  Goal: No goal specified  Planned Treatment Start Date: No planned start date specified              Review of Systems   Constitutional:  Negative for appetite change, diaphoresis, fatigue, fever, unexpected weight gain and unexpected weight loss.   HENT:  Negative for hearing loss, mouth sores, sore throat, swollen glands, trouble swallowing and voice change.    Eyes:  Negative for blurred vision.   Respiratory:  Negative for cough, shortness of breath and wheezing.    Cardiovascular:  Negative for chest pain and palpitations.   Gastrointestinal:  Negative for abdominal pain, blood in stool, constipation, diarrhea, nausea and vomiting.   Endocrine: Negative for cold intolerance and heat intolerance.   Genitourinary:  Negative for difficulty urinating, dysuria, frequency, hematuria and urinary incontinence.   Musculoskeletal:  Negative for arthralgias, back pain and myalgias.   Skin:  Negative for rash, skin lesions and wound.        Pt has burns on skins from radiation    Neurological:  Negative for dizziness, seizures, weakness, numbness and headache.   Hematological:  Does not bruise/bleed easily.   Psychiatric/Behavioral:  Negative for depressed mood. The patient is not nervous/anxious.    All other systems reviewed and are negative.    Current Outpatient Medications on File Prior to Visit    Medication Sig Dispense Refill    anastrozole (ARIMIDEX) 1 MG tablet Take 1 tablet by mouth Daily. 30 tablet 1    atorvastatin (LIPITOR) 10 MG tablet TAKE 1 TABLET BY MOUTH EVERY NIGHT AT BEDTIME 90 tablet 1    Calcium Carb-Cholecalciferol (Calcium 600+D) 600-20 MG-MCG tablet Take 1 tablet by mouth Daily. 90 tablet 1    Continuous Blood Gluc Sensor (FreeStyle Los 3 Sensor) misc 1 each Every 14 (Fourteen) Days. 3 each 5    Emgality 120 MG/ML auto-injector pen Inject 1 mL under the skin into the appropriate area as directed 1 (One) Time.      lansoprazole (PREVACID) 30 MG capsule TAKE 1 CAPSULE BY MOUTH DAILY 90 capsule 5    lidocaine (LIDODERM) 5 % Place 1 patch on the skin as directed by provider Daily. Remove & Discard patch within 12 hours or as directed by MD (Patient not taking: Reported on 9/16/2024) 30 each 0    metoprolol succinate XL (TOPROL-XL) 25 MG 24 hr tablet TAKE 2 TABLETS BY MOUTH DAILY (Patient taking differently: Take 1 tablet by mouth 2 (Two) Times a Day.) 180 tablet 1    oxyCODONE-acetaminophen (Percocet) 5-325 MG per tablet Take 1 tablet by mouth Every 6 (Six) Hours As Needed for Moderate Pain for up to 20 doses. (Patient not taking: Reported on 9/16/2024) 20 tablet 0    Ozempic, 1 MG/DOSE, 4 MG/3ML solution pen-injector INJECT 1 MG INTO THE SKIN ONCE WEEKLY AS DIRECTED 3 mL 0    SUMAtriptan (Imitrex) 100 MG tablet Take 1 tablet by mouth As Needed for Migraine. 12 tablet 11    traZODone (DESYREL) 100 MG tablet Take 1 tablet by mouth Every Night. 90 tablet 1     No current facility-administered medications on file prior to visit.       Allergies   Allergen Reactions    Dulaglutide Itching     Past Medical History:   Diagnosis Date    Abnormal ECG 08/16/2016    Abnormal Pap smear of cervix 2011    Acid reflux     Allergic rhinitis     Breast mass July 2024    Bursitis of left hip 04/06/2018    Cholelithiasis 2002    Colon polyps     Constipation     CTS (carpal tunnel syndrome) 1998    Diabetes  mellitus, type 2 05/08/2019    Diarrhea     DM2 (diabetes mellitus, type 2) 05/08/2019    Fatigue     Frequent headaches     Gastroesophageal reflux     GERD (gastroesophageal reflux disease)     Gestational diabetes     Headache, tension-type 1989    History of screening mammography 02/2019 2020 scheduled    HLD (hyperlipidemia)     HPV in female 2013    Hyperlipidemia     Hypertension     Hypotension     IBS (irritable bowel syndrome)     LGSIL on Pap smear of cervix 2012    LGSIL on Pap smear of cervix 2011    Menorrhagia     Migraine     Mild dysplasia of cervix (DOMINICK I)     Mild dysplasia of cervix (DOMINICK I) 2011    Numbness and tingling     in feet    Pain of left hip joint 04/06/2018    Peripheral neuropathy     PONV (postoperative nausea and vomiting)     Screening mammogram, encounter for 02/2019 2020 SCHEDULED     Sinus trouble     Spinal headache     Tendinitis of left hip 04/06/2018    Urinary frequency     Urinary urgency      Past Surgical History:   Procedure Laterality Date    ABDOMINAL HYSTERECTOMY      BRAIN SURGERY  2019    Pituitary tumor    BREAST BIOPSY  July 2024    Left    BREAST BIOPSY Left 8/30/2024    Procedure: Left breast needle localized lumpectomy, RIGHT BACK CYST EXCISION;  Surgeon: Ana Paula Patterson MD;  Location: [facility] OR [facility] [place];  Service: General;  Laterality: Left;    BREAST SURGERY  8/30/24    CARPAL TUNNEL RELEASE  1999    CHOLECYSTECTOMY      COLONOSCOPY  2020 2015 & 2020     COLONOSCOPY  2015, 2020    COLONOSCOPY N/A 06/13/2023    Procedure: COLONOSCOPY WITH COLD SNARE POLYPECTOMIES;  Surgeon: Jose David Berry MD;  Location: [facility] ENDOSCOPY;  Service: Gastroenterology;  Laterality: N/A;  COLON POLYPS    COLPOSCOPY  03/23/2011    D & C HYSTEROSCOPY ENDOMETRIAL ABLATION      ENDOMETRIAL ABLATION  2003    ENDOSCOPY  2020    HYSTERECTOMY  2004    LAPAROSCOPIC TUBAL LIGATION W/ FALOPE RING  2003    MAMMO STEREOTACTIC BREAST BIOPSY 1ST W WO DEVICE Right 2007     TONSILLECTOMY  1990    TUMOR REMOVAL  2019    transsphenoidal    UPPER GASTROINTESTINAL ENDOSCOPY  2020     Social History     Socioeconomic History    Marital status:    Tobacco Use    Smoking status: Never     Passive exposure: Never    Smokeless tobacco: Never   Vaping Use    Vaping status: Never Used   Substance and Sexual Activity    Alcohol use: Never    Drug use: Never    Sexual activity: Yes     Partners: Male     Birth control/protection: Hysterectomy, Surgical     Family History   Problem Relation Age of Onset    Hypertension Father     Heart disease Father     Diabetes Father     Arthritis Father     Liver disease Father     Cirrhosis Father     Liver cancer Father     Cancer Father         Liver    Breast cancer Mother     Arthritis Mother     Liver disease Mother     Migraines Mother     Neuropathy Mother     Cancer Mother         Inflammatory Breast Cancer    Kidney disease Paternal Grandfather     Heart disease Paternal Grandfather     Colon cancer Paternal Grandfather 40    Colon polyps Paternal Grandfather     Uterine cancer Maternal Grandmother     Migraines Maternal Grandmother     Migraines Maternal Aunt     Migraines Paternal Aunt     Liver cancer Other     Lung cancer Other     Ovarian cancer Neg Hx     Melanoma Neg Hx     Prostate cancer Neg Hx     Deep vein thrombosis Neg Hx        Objective   Physical Exam  Well appearing patient in no acute distress on RA  Anicteric sclerae, no rash on exposed skin  Respirations non-labored  Awake, alert, and oriented x 4. Speech intact. No gross neurologic deficit  Appropriate mood and affect    There were no vitals filed for this visit.              PHQ-9 Total Score:                      Result Review :   The following data was reviewed by: Jed Lowe MD on 11/07/24:  Lab Results   Component Value Date    HGB 13.8 06/17/2024    HCT 40.0 06/17/2024    MCV 90.5 06/17/2024     06/17/2024    WBC 5.44 06/17/2024    NEUTROABS 3.35  06/17/2024    LYMPHSABS 1.45 06/17/2024    MONOSABS 0.31 06/17/2024    EOSABS 0.29 06/17/2024    BASOSABS 0.03 06/17/2024     Lab Results   Component Value Date    GLUCOSE 138 (H) 06/17/2024    BUN 9 06/17/2024    CREATININE 0.80 06/17/2024     06/17/2024    K 3.8 06/17/2024     06/17/2024    CO2 26.1 06/17/2024    CALCIUM 9.1 06/17/2024    PROTEINTOT 6.3 06/17/2024    ALBUMIN 4.2 06/17/2024    BILITOT 1.4 (H) 06/17/2024    ALKPHOS 102 06/17/2024    AST 11 06/17/2024    ALT 15 06/17/2024     Lab Results   Component Value Date    MG 1.6 04/25/2019    FREET4 1.15 06/20/2023    TSH 1.470 06/20/2023     Labs personally reviewed.    Rad onc note personally reviewed.      DEXA Bone Density Axial    Result Date: 10/30/2024  Impression: Normal Bone Mineral Density. Report dictated by: Saima Henderson  I have personally reviewed this case and agree with the findings above: Electronically Signed: Jaskaran Haley MD  10/30/2024 3:54 PM EDT  Workstation ID: [id number]         Assessment and Plan    Diagnoses and all orders for this visit:    1. Ductal carcinoma in situ (DCIS) of left breast (Primary)  -     anastrozole (ARIMIDEX) 1 MG tablet; Take 1 tablet by mouth Daily.  Dispense: 90 tablet; Refill: 1    2. Nausea    3. Hot flashes related to aromatase inhibitor therapy    4. Insomnia due to medical condition    Other orders  -     ondansetron (ZOFRAN) 8 MG tablet; Take 1 tablet by mouth Every 8 (Eight) Hours As Needed for Nausea or Vomiting.  Dispense: 30 tablet; Refill: 1        Left breast DCIS  Lumpectomy 8/30/24 with low grade DCIS. ER/NJ positive. Plan for adjuvant radiation.  Due to hormone receptor positivity remaining breast tissue recommend adjuvant hormonal therapy.  9/30/24 FSH and estradiol confirmed post-menopausal status. Started adjuvant aromatase inhibitor with anastrozole 1 mg daily at that time. Side effects of nausea, headache, insomnia, hot flashes. Recommend PRN zofran and low dose melatonin.  Side effects are improving. Will continue for now. Do have option of alternative AI. Recommend vitamin D and calcium. 10/2024 baseline DEXA scan normal, repeat q2 years. Return to clinic in 2 month for toxicity check.       I spent 25 minutes caring for Laila on this date of service. This time includes time spent by me in the following activities:preparing for the visit, reviewing tests, obtaining and/or reviewing a separately obtained history, performing a medically appropriate examination and/or evaluation , counseling and educating the patient/family/caregiver, ordering medications, tests, or procedures, referring and communicating with other health care professionals , documenting information in the medical record, independently interpreting results and communicating that information with the patient/family/caregiver, and care coordination    Patient Follow Up: 2 months  Patient was given instructions and counseling regarding her condition or for health maintenance advice. Please see specific information pulled into the AVS if appropriate.

## 2024-11-07 NOTE — PROGRESS NOTES
"Chief Complaint  Follow-up    Subjective          Follow-up    The patient is here to follow up on needle loc lumpectomy and cyst excision.  They are doing well and have no complaints.  Pathology is shown below:    Clinical Information    Atypical ductal hyperplasia of breast   Final Diagnosis   1.  Left breast mass, excision:               - Low-grade ductal carcinoma in situ (DCIS), 6 mm, see comment                - Breast biomarker testing (performed on prior biopsy  , interpreted by Dr RACHEAL Gabriel).:                            - Estrogen Receptor (ER):  Positive (100%, strong)                            - Progesterone Receptor (PgR):  Positive (90%, moderate)                - See synoptic report     2.  Upper right back cyst, excision:               - Epidermal inclusion cyst        Objective   Vital Signs:   Resp 18   Ht 157.5 cm (62.01\")   Wt 66.7 kg (147 lb)   BMI 26.88 kg/m²     Physical Exam  Vitals and nursing note reviewed.   Constitutional:       General: She is not in acute distress.     Appearance: Normal appearance. She is well-developed.   HENT:      Head: Normocephalic and atraumatic.   Eyes:      Extraocular Movements: Extraocular movements intact.      Pupils: Pupils are equal, round, and reactive to light.   Cardiovascular:      Pulses: Normal pulses.   Pulmonary:      Effort: Pulmonary effort is normal. No retractions.      Breath sounds: Normal air entry. No wheezing.   Abdominal:      General: There is no distension.      Palpations: Abdomen is soft.      Tenderness: There is no abdominal tenderness.      Hernia: No hernia is present.   Musculoskeletal:         General: No swelling or deformity.      Cervical back: Neck supple.   Skin:     General: Skin is warm and dry.      Findings: No erythema.      Comments: Surgical Incision Healing Well on breast and on back   Neurological:      General: No focal deficit present.      Mental Status: She is alert and oriented to person, place, " and time.      Motor: Motor function is intact.   Psychiatric:         Mood and Affect: Mood normal.         Thought Content: Thought content normal.            Result Review :                 Assessment and Plan    Diagnoses and all orders for this visit:    1. Ductal carcinoma in situ (DCIS) of left breast (Primary)    Keep apt with oncology   Followup in June 2025 after screening and then will have MRI for dec 2025      Follow Up   Return in about 8 months (around 6/25/2025).  Patient was given instructions and counseling regarding her condition or for health maintenance advice. Please see specific information pulled into the AVS if appropriate.     Answers submitted by the patient for this visit:  Other (Submitted on 9/4/2024)  Please describe your symptoms.: Surgery follow-up  Have you had these symptoms before?: Yes  How long have you been having these symptoms?: 5-7 days  Primary Reason for Visit (Submitted on 9/4/2024)  What is the primary reason for your visit?: Other

## 2024-11-16 DIAGNOSIS — G47.00 INSOMNIA, UNSPECIFIED TYPE: ICD-10-CM

## 2024-11-18 RX ORDER — TRAZODONE HYDROCHLORIDE 100 MG/1
100 TABLET ORAL NIGHTLY
Qty: 90 TABLET | Refills: 1 | Status: SHIPPED | OUTPATIENT
Start: 2024-11-18

## 2024-11-21 DIAGNOSIS — D05.12 DUCTAL CARCINOMA IN SITU (DCIS) OF LEFT BREAST: Primary | ICD-10-CM

## 2024-11-22 ENCOUNTER — TELEPHONE (OUTPATIENT)
Dept: FAMILY MEDICINE CLINIC | Facility: CLINIC | Age: 54
End: 2024-11-22
Payer: COMMERCIAL

## 2024-11-25 DIAGNOSIS — E11.65 TYPE 2 DIABETES MELLITUS WITH HYPERGLYCEMIA, WITHOUT LONG-TERM CURRENT USE OF INSULIN: ICD-10-CM

## 2024-11-26 ENCOUNTER — TELEPHONE (OUTPATIENT)
Dept: OBSTETRICS AND GYNECOLOGY | Facility: CLINIC | Age: 54
End: 2024-11-26
Payer: COMMERCIAL

## 2024-11-26 RX ORDER — SEMAGLUTIDE 1.34 MG/ML
INJECTION, SOLUTION SUBCUTANEOUS
Qty: 3 ML | Refills: 0 | Status: SHIPPED | OUTPATIENT
Start: 2024-11-26

## 2024-11-26 NOTE — TELEPHONE ENCOUNTER
11/26/2024 [facility] FOR PATIENT TO RETURN CALL CONCERNING  APPOINTMENT WITH DR GOMEZ.  AN APPT WAS MADE ON 1/3 WITH DR GOMEZ BUT WAS CANCELLED.  CALLED PATIENT BACK AND ADVISED THAT THERE WAS A 1/30/2025 APPT WITH DR GOMEZ IF SHE COULD DO.  I ASK THAT PATIENT CALL US BACK AND ADVISE.   12/06/2024 PATIENT WAS CALLED AND SHE ADVISED THAT APPT IS NO LONGER NEEDED.

## 2024-12-05 ENCOUNTER — PATIENT ROUNDING (BHMG ONLY) (OUTPATIENT)
Dept: RADIATION ONCOLOGY | Facility: HOSPITAL | Age: 54
End: 2024-12-05
Payer: COMMERCIAL

## 2024-12-05 NOTE — PROGRESS NOTES
"Patient completed radiation treatment for breast cancer on 10/31/24. Following up with patient regarding any concerns the patient may have at this time and receiving feedback in regards to patient over all care while under treatment.     Patient asked about symptoms and side effects that continue to be bothersome. Ms. Bell denies any skin irritations such as redness, peeling or open areas. Denies itching. Denies any fatigue or pain. Is continuing to moisturize.    Patient was asked if there was anything that could've made their experience better at [facility] while they were under treatment. Patient states: \"no, all good.\"    Patient encouraged to call the office if any concerns arise. Patient reminded of follow up appointment on 12/10/24 at 2:30 with Nathaly Croft.  Patient reminded of our new location.    "

## 2024-12-10 ENCOUNTER — OFFICE VISIT (OUTPATIENT)
Dept: RADIATION ONCOLOGY | Facility: HOSPITAL | Age: 54
End: 2024-12-10
Payer: COMMERCIAL

## 2024-12-10 VITALS
SYSTOLIC BLOOD PRESSURE: 116 MMHG | TEMPERATURE: 97.6 F | HEART RATE: 87 BPM | RESPIRATION RATE: 16 BRPM | DIASTOLIC BLOOD PRESSURE: 79 MMHG | OXYGEN SATURATION: 98 % | BODY MASS INDEX: 27.17 KG/M2 | WEIGHT: 148.59 LBS

## 2024-12-10 DIAGNOSIS — D05.12 DUCTAL CARCINOMA IN SITU (DCIS) OF LEFT BREAST: Primary | ICD-10-CM

## 2024-12-10 DIAGNOSIS — T45.1X5A HOT FLASHES RELATED TO AROMATASE INHIBITOR THERAPY: ICD-10-CM

## 2024-12-10 DIAGNOSIS — Z92.3 STATUS POST RADIATION THERAPY WITHIN FOUR TO TWELVE WEEKS: ICD-10-CM

## 2024-12-10 DIAGNOSIS — Z79.811 USE OF ANASTROZOLE (ARIMIDEX): ICD-10-CM

## 2024-12-10 DIAGNOSIS — Z08 ENCOUNTER FOR FOLLOW-UP EXAMINATION AFTER COMPLETED TREATMENT FOR MALIGNANT NEOPLASM: ICD-10-CM

## 2024-12-10 DIAGNOSIS — R23.2 HOT FLASHES RELATED TO AROMATASE INHIBITOR THERAPY: ICD-10-CM

## 2024-12-10 PROCEDURE — G0463 HOSPITAL OUTPT CLINIC VISIT: HCPCS | Performed by: NURSE PRACTITIONER

## 2024-12-10 NOTE — PROGRESS NOTES
Follow Up Office Visit      Encounter Date: 12/10/2024   Patient Name: Laila Bell  YOB: 1970   Medical Record Number: [id number]   Primary Diagnosis: Ductal carcinoma in situ (DCIS) of left breast [D05.12]     Cancer Staging   Ductal carcinoma in situ (DCIS) of left breast  Staging form: Breast, AJCC 8th Edition  - Pathologic: Stage Unknown (pTis (DCIS), pNX, cM0, ER+, NH+) - Signed by Jed Lowe MD on 9/30/2024    Radiation Completion Date:  10/31/2024    Chief Complaint:    Chief Complaint   Patient presents with    Follow-up    Breast Cancer       Oncology/Hematology History Overview Note   7/2/24: MRI breast (screening due to family history breast cancer)  revealed a 0.8 cm enhancing mass in the central left breast, 6 cm from the nipple.      7/24/24: Pathology from MRI guided core needle biopsy revealed atypical ductal hyperplasia with an intraductal papilloma.      8/30/24: Left breast lumpectomy with pathology revealing low-grade DCIS measuring 6 mm in greatest dimension with negative margins, ER positive/NH positive.      9/30/24: Orders written for anastrozole 1 mg daily.     Adjuvant radiation planned.         Ductal carcinoma in situ (DCIS) of left breast   9/30/2024 Initial Diagnosis    Ductal carcinoma in situ (DCIS) of left breast     9/30/2024 Cancer Staged    Staging form: Breast, AJCC 8th Edition  - Pathologic: Stage Unknown (pTis (DCIS), pNX, cM0, ER+, NH+) - Signed by Jed Lowe MD on 9/30/2024     Intraductal carcinoma in situ of left breast   10/3/2024 Initial Diagnosis    Intraductal carcinoma in situ of left breast     10/10/2024 - 10/31/2024 Radiation    Radiation OncologyTreatment Course:  Laila Bell received 4256 cGy in 16 fractions to the left breast.          History of Present Illness: Laila Bell is a 54 y.o. female who returns to [facility] for short interval follow-up after completing external beam  radiotherapy on 10/31/24. She reports feeling well overall with no treatment related concerns. She did experience some skin irritation, most notable in axillary region, in the weeks following completion of treatment, but this has since improved. She experiences an occasional fleeting pain within the right breast that is short in duration. Denies other breast pain or tenderness, palpable masses, nipple changes or nipple discharge, limited range of motion in upper extremity. She followed up with Dr. Lowe on 11/7/24; note reviewed. She is taking anastrozole and experienced nausea, hot flashes, trouble sleeping and headaches after starting the medication but she reports symptoms are improving. Vitamin E has reduced hot flashes.      Subjective      Review of Systems: Review of Systems   Constitutional:  Negative for appetite change and fatigue.   HENT:  Negative for sore throat and trouble swallowing.    Respiratory:  Negative for cough and shortness of breath.    Cardiovascular:  Negative for chest pain and palpitations.   Gastrointestinal:  Positive for constipation (has occasional IBS symtpoms). Negative for abdominal pain, blood in stool, diarrhea, nausea and vomiting.   Endocrine: Positive for heat intolerance.   Genitourinary:  Negative for difficulty urinating, dysuria, frequency, hematuria and urgency.   Musculoskeletal:  Negative for myalgias.   Skin:  Positive for color change. Negative for rash.   Neurological:  Negative for dizziness, weakness, light-headedness and headaches.   Psychiatric/Behavioral:  Positive for sleep disturbance.        The following portions of the patient's history were reviewed and updated as appropriate: allergies, current medications, past family history, past medical history, past social history, past surgical history and problem list.    Medications:     Current Outpatient Medications:     anastrozole (ARIMIDEX) 1 MG tablet, Take 1 tablet by mouth Daily., Disp: 90 tablet, Rfl:  1    atorvastatin (LIPITOR) 10 MG tablet, TAKE 1 TABLET BY MOUTH EVERY NIGHT AT BEDTIME, Disp: 90 tablet, Rfl: 1    Calcium Carb-Cholecalciferol (Calcium 600+D) 600-20 MG-MCG tablet, Take 1 tablet by mouth Daily., Disp: 90 tablet, Rfl: 1    Emgality 120 MG/ML auto-injector pen, Inject 1 mL under the skin into the appropriate area as directed 1 (One) Time., Disp: , Rfl:     lansoprazole (PREVACID) 30 MG capsule, TAKE 1 CAPSULE BY MOUTH DAILY, Disp: 90 capsule, Rfl: 5    metoprolol succinate XL (TOPROL-XL) 25 MG 24 hr tablet, TAKE 2 TABLETS BY MOUTH DAILY (Patient taking differently: Take 1 tablet by mouth 2 (Two) Times a Day.), Disp: 180 tablet, Rfl: 1    ondansetron (ZOFRAN) 8 MG tablet, Take 1 tablet by mouth Every 8 (Eight) Hours As Needed for Nausea or Vomiting., Disp: 30 tablet, Rfl: 1    Ozempic, 1 MG/DOSE, 4 MG/3ML solution pen-injector, INJECT 1MG INTO THE SKIN ONCE WEEKLY AS DIRECTED, Disp: 3 mL, Rfl: 0    SUMAtriptan (Imitrex) 100 MG tablet, Take 1 tablet by mouth As Needed for Migraine., Disp: 12 tablet, Rfl: 11    traZODone (DESYREL) 100 MG tablet, TAKE 1 TABLET BY MOUTH EVERY NIGHT, Disp: 90 tablet, Rfl: 1    Continuous Blood Gluc Sensor (FreeStyle Los 3 Sensor) misc, 1 each Every 14 (Fourteen) Days., Disp: 3 each, Rfl: 5    Allergies:   Allergies   Allergen Reactions    Dulaglutide Itching       Patient Smoking History:   Social History     Tobacco Use   Smoking Status Never    Passive exposure: Never   Smokeless Tobacco Never       Measures:  PHQ-9 Total Score: 2   Quality of Life: 100 - Full Activity   ECOG score: 0  ECOG: (0) Fully active, able to carry on all predisease performance without restriction  Pain: (on a scale of 0-10)   Pain Score    12/10/24 1447   PainSc: 0-No pain       Objective     Physical Exam:   Vital Signs:   Vitals:    12/10/24 1447   BP: 116/79   Pulse: 87   Resp: 16   Temp: 97.6 °F (36.4 °C)   TempSrc: Temporal   SpO2: 98%   Weight: 67.4 kg (148 lb 9.4 oz)   PainSc: 0-No  pain     Body mass index is 27.17 kg/m².   Wt Readings from Last 3 Encounters:   12/10/24 67.4 kg (148 lb 9.4 oz)   11/07/24 66.7 kg (147 lb)   11/07/24 67.1 kg (147 lb 14.9 oz)       Physical Exam  Vitals reviewed.   Constitutional:       General: She is not in acute distress.     Appearance: Normal appearance. She is normal weight. She is not ill-appearing.   HENT:      Head: Normocephalic and atraumatic.   Eyes:      Conjunctiva/sclera: Conjunctivae normal.      Pupils: Pupils are equal, round, and reactive to light.   Pulmonary:      Effort: Pulmonary effort is normal. No respiratory distress.   Chest:   Breasts:     Left: No swelling, bleeding, inverted nipple, mass, nipple discharge, skin change or tenderness.       Musculoskeletal:         General: Normal range of motion.      Cervical back: Normal range of motion.      Comments: Full AROM LUE    Lymphadenopathy:      Upper Body:      Left upper body: No supraclavicular or axillary adenopathy.   Skin:     General: Skin is warm and dry.   Neurological:      General: No focal deficit present.      Mental Status: She is alert and oriented to person, place, and time. Mental status is at baseline.   Psychiatric:         Mood and Affect: Mood normal.         Behavior: Behavior normal.       Result Review: I independently reviewed the following data.   -- Tissue Pathology Exam (08/30/2024 11:04)   -- Tissue Pathology Exam (07/24/2024 13:50)     Pathology:   Lab Results   Component Value Date    CLININFO  08/30/2024     Atypical ductal hyperplasia of breast      FINALDX  08/30/2024     1.  Left breast mass, excision:   - Low-grade ductal carcinoma in situ (DCIS), 6 mm, see comment    - Breast biomarker testing (performed on prior biopsy  , interpreted by Dr RACHEAL Gabriel).:    - Estrogen Receptor (ER):  Positive (100%, strong)    - Progesterone Receptor (PgR):  Positive (90%, moderate)    - See synoptic report    2.  Upper right back cyst, excision:   -  Epidermal inclusion cyst     The above positive (malignant) diagnosis was called to Dr. Patterson  at 9/9/2024 on 2:41 PM by Dr RACHEAL Gabriel.        SYNOPTIC  08/30/2024     DCIS OF THE BREAST: Resection  DCIS OF THE BREAST: RESECTION - All Specimens  8th Edition - Protocol posted: 3/23/2022    SPECIMEN     Procedure:    Excision (less than total mastectomy)      Specimen Laterality:    Left     TUMOR     Histologic Type:    Ductal carcinoma in situ      Size (Extent) of DCIS:    Estimated size (extent) of DCIS is at least (Millimeters): 6 mm     Nuclear Grade:    Grade I (low)      Necrosis:    Not identified     MARGINS     Margin Status:    All margins negative for DCIS        Distance from DCIS to Closest Margin:    6 mm       Closest Margin(s) to DCIS:    Anterior     REGIONAL LYMPH NODES     Regional Lymph Node Status:    Not applicable (no regional lymph nodes submitted or found)     PATHOLOGIC STAGE CLASSIFICATION (pTNM, AJCC 8th Edition)     Reporting of pT, pN, and (when applicable) pM categories is based on information available to the pathologist at the time the report is issued. As per the AJCC (Chapter 1, 8th Ed.) it is the managing physician’s responsibility to establish the final pathologic stage based upon all pertinent information, including but potentially not limited to this pathology report.     pT Category:    pTis (DCIS)      pN Category:    pN not assigned (no nodes submitted or found)   Breast Biomarker Reporting Template  BREAST BIOMARKER REPORTING TEMPLATE - All Specimens  Protocol posted: 12/13/2023       Test(s) Performed:           Estrogen Receptor (ER) Status:    Positive (greater than 10% of cells demonstrate nuclear positivity)          Percentage of Cells with Nuclear Positivity:    100 %         Average Intensity of Staining:    Strong        Test Type:    Food and Drug Administration (FDA) cleared (test / vendor): Roche        Primary Antibody:    SP1      Test(s) Performed:            Progesterone Receptor (PgR) Status:    Positive          Percentage of Cells with Nuclear Positivity:    90 %         Average Intensity of Staining:    Moderate        Test Type:    Food and Drug Administration (FDA) cleared (test / vendor): Roche        Primary Antibody:    1E2      Cold Ischemia and Fixation Times:    Meet requirements specified in latest version of the ASCO / CAP Guidelines      Testing Performed on Block Number(s):    [id number] 1D        Imaging: DEXA Bone Density Axial    Result Date: 10/30/2024  Impression: Normal Bone Mineral Density. Report dictated by: Saima Henderson  I have personally reviewed this case and agree with the findings above: Electronically Signed: Jaskaran Haley MD  10/30/2024 3:54 PM EDT  Workstation ID: [id number]      Labs:   WBC   Date Value Ref Range Status   06/17/2024 5.44 3.40 - 10.80 10*3/mm3 Final   06/07/2019 9.1 4.5 - 11.0 10*3/uL Final     Hemoglobin   Date Value Ref Range Status   06/17/2024 13.8 12.0 - 15.9 g/dL Final   06/07/2019 14.0 12.0 - 16.0 g/dL Final     Hematocrit   Date Value Ref Range Status   06/17/2024 40.0 34.0 - 46.6 % Final   06/07/2019 39.6 33.0 - 51.0 % Final     Platelets   Date Value Ref Range Status   06/17/2024 179 140 - 450 10*3/mm3 Final   06/07/2019 250 150 - 450 10*3/uL Final     Creatinine   Date Value Ref Range Status   06/17/2024 0.80 0.57 - 1.00 mg/dL Final   06/07/2019 0.5 (L) 0.7 - 1.5 mg/dL Final     BUN   Date Value Ref Range Status   06/17/2024 9 6 - 20 mg/dL Final   06/07/2019 9 7 - 20 mg/dL Final     eGFR   Date Value Ref Range Status   06/17/2024 88.8 >60.0 mL/min/1.73 Final     TSH   Date Value Ref Range Status   06/20/2023 1.470 0.270 - 4.200 uIU/mL Final   06/07/2019 1.990 0.27 - 4.20 u(iU)/mL Final     Assessment / Plan      Impression: Laila Bell is a pleasant 54 y.o. female with DCIS of the left breast. She is status post left breast lumpectomy on 8/30/2024 with pathology revealing low-grade DCIS measuring 6  mm in greatest dimension with negative margins, ER/PA positive. She is status post external beam radiotherapy to the left breast, completed on 10/31/2024. Received 4256 cGy in 16 fractions. She is doing well overall and remains without evidence of disease clinically. She does have a thick pad of scar tissue within the left lumpectomy bed. Recommended Vitamin E oil and gentle scar tissue massage. She is taking anastrozole and is experiencing some associated symptoms but these are improving. Screening mammogram is scheduled for 6/23/25 as ordered by Dr. Patterson.     Assessment/Plan:   Diagnoses and all orders for this visit:    1. Ductal carcinoma in situ (DCIS) of left breast (Primary)    2. Status post radiation therapy within four to twelve weeks    3. Encounter for follow-up examination after completed treatment for malignant neoplasm    4. Use of anastrozole (Arimidex)    5. Hot flashes related to aromatase inhibitor therapy       Follow Up:   Return for follow-up in 6 months.   Follow-up with Dr. Lowe on 1/14/25.   Screening mammogram scheduled for 6/23/25.    Follow-up with Dr. Patterson on 6/26/25.      Return in about 6 months (around 6/10/2025) for Office Visit.  Laila Bell was encouraged to contact me in the interim with any questions or concerns regarding her care.      SONIDO Kraus  Radiation Oncology  [facility]    This document has been signed by SONIDO Hartley on December 10, 2024 15:57 EST

## 2024-12-19 ENCOUNTER — LAB (OUTPATIENT)
Dept: LAB | Facility: HOSPITAL | Age: 54
End: 2024-12-19
Payer: COMMERCIAL

## 2024-12-19 DIAGNOSIS — E11.65 TYPE 2 DIABETES MELLITUS WITH HYPERGLYCEMIA, WITHOUT LONG-TERM CURRENT USE OF INSULIN: ICD-10-CM

## 2024-12-19 LAB
ALBUMIN SERPL-MCNC: 4.2 G/DL (ref 3.5–5.2)
ALBUMIN UR-MCNC: 1.5 MG/DL
ALBUMIN/GLOB SERPL: 2 G/DL
ALP SERPL-CCNC: 113 U/L (ref 39–117)
ALT SERPL W P-5'-P-CCNC: 16 U/L (ref 1–33)
AMORPH URATE CRY URNS QL MICRO: ABNORMAL /HPF
ANION GAP SERPL CALCULATED.3IONS-SCNC: 10.4 MMOL/L (ref 5–15)
AST SERPL-CCNC: 23 U/L (ref 1–32)
BACTERIA UR QL AUTO: ABNORMAL /HPF
BASOPHILS # BLD AUTO: 0.02 10*3/MM3 (ref 0–0.2)
BASOPHILS NFR BLD AUTO: 0.4 % (ref 0–1.5)
BILIRUB SERPL-MCNC: 0.8 MG/DL (ref 0–1.2)
BILIRUB UR QL STRIP: NEGATIVE
BUN SERPL-MCNC: 9 MG/DL (ref 6–20)
BUN/CREAT SERPL: 10.3 (ref 7–25)
CALCIUM SPEC-SCNC: 9.4 MG/DL (ref 8.6–10.5)
CHLORIDE SERPL-SCNC: 102 MMOL/L (ref 98–107)
CHOLEST SERPL-MCNC: 121 MG/DL (ref 0–200)
CLARITY UR: ABNORMAL
CO2 SERPL-SCNC: 28.6 MMOL/L (ref 22–29)
COLOR UR: ABNORMAL
CREAT SERPL-MCNC: 0.87 MG/DL (ref 0.57–1)
CREAT UR-MCNC: 301 MG/DL
DEPRECATED RDW RBC AUTO: 42 FL (ref 37–54)
EGFRCR SERPLBLD CKD-EPI 2021: 79.8 ML/MIN/1.73
EOSINOPHIL # BLD AUTO: 0.22 10*3/MM3 (ref 0–0.4)
EOSINOPHIL NFR BLD AUTO: 4.2 % (ref 0.3–6.2)
ERYTHROCYTE [DISTWIDTH] IN BLOOD BY AUTOMATED COUNT: 12.2 % (ref 12.3–15.4)
GLOBULIN UR ELPH-MCNC: 2.1 GM/DL
GLUCOSE SERPL-MCNC: 181 MG/DL (ref 65–99)
GLUCOSE UR STRIP-MCNC: ABNORMAL MG/DL
HBA1C MFR BLD: 6.1 % (ref 4.8–5.6)
HCT VFR BLD AUTO: 38.5 % (ref 34–46.6)
HDLC SERPL-MCNC: 32 MG/DL (ref 40–60)
HGB BLD-MCNC: 13.4 G/DL (ref 12–15.9)
HGB UR QL STRIP.AUTO: NEGATIVE
HOLD SPECIMEN: NORMAL
HYALINE CASTS UR QL AUTO: ABNORMAL /LPF
IMM GRANULOCYTES # BLD AUTO: 0.01 10*3/MM3 (ref 0–0.05)
IMM GRANULOCYTES NFR BLD AUTO: 0.2 % (ref 0–0.5)
KETONES UR QL STRIP: ABNORMAL
LDLC SERPL CALC-MCNC: 64 MG/DL (ref 0–100)
LDLC/HDLC SERPL: 1.91 {RATIO}
LEUKOCYTE ESTERASE UR QL STRIP.AUTO: ABNORMAL
LYMPHOCYTES # BLD AUTO: 0.99 10*3/MM3 (ref 0.7–3.1)
LYMPHOCYTES NFR BLD AUTO: 18.9 % (ref 19.6–45.3)
MCH RBC QN AUTO: 32.4 PG (ref 26.6–33)
MCHC RBC AUTO-ENTMCNC: 34.8 G/DL (ref 31.5–35.7)
MCV RBC AUTO: 93.2 FL (ref 79–97)
MICROALBUMIN/CREAT UR: 5 MG/G (ref 0–29)
MONOCYTES # BLD AUTO: 0.38 10*3/MM3 (ref 0.1–0.9)
MONOCYTES NFR BLD AUTO: 7.3 % (ref 5–12)
NEUTROPHILS NFR BLD AUTO: 3.61 10*3/MM3 (ref 1.7–7)
NEUTROPHILS NFR BLD AUTO: 69 % (ref 42.7–76)
NITRITE UR QL STRIP: NEGATIVE
NRBC BLD AUTO-RTO: 0 /100 WBC (ref 0–0.2)
PH UR STRIP.AUTO: 5.5 [PH] (ref 5–8)
PLATELET # BLD AUTO: 198 10*3/MM3 (ref 140–450)
PMV BLD AUTO: 9.3 FL (ref 6–12)
POTASSIUM SERPL-SCNC: 3.9 MMOL/L (ref 3.5–5.2)
PROT SERPL-MCNC: 6.3 G/DL (ref 6–8.5)
PROT UR QL STRIP: ABNORMAL
RBC # BLD AUTO: 4.13 10*6/MM3 (ref 3.77–5.28)
RBC # UR STRIP: ABNORMAL /HPF
REF LAB TEST METHOD: ABNORMAL
SODIUM SERPL-SCNC: 141 MMOL/L (ref 136–145)
SP GR UR STRIP: 1.03 (ref 1–1.03)
SQUAMOUS #/AREA URNS HPF: ABNORMAL /HPF
TRIGL SERPL-MCNC: 139 MG/DL (ref 0–150)
UROBILINOGEN UR QL STRIP: ABNORMAL
VLDLC SERPL-MCNC: 25 MG/DL (ref 5–40)
WBC # UR STRIP: ABNORMAL /HPF
WBC NRBC COR # BLD AUTO: 5.23 10*3/MM3 (ref 3.4–10.8)

## 2024-12-19 PROCEDURE — 81001 URINALYSIS AUTO W/SCOPE: CPT

## 2024-12-19 PROCEDURE — 85025 COMPLETE CBC W/AUTO DIFF WBC: CPT

## 2024-12-19 PROCEDURE — 80061 LIPID PANEL: CPT

## 2024-12-19 PROCEDURE — 83036 HEMOGLOBIN GLYCOSYLATED A1C: CPT

## 2024-12-19 PROCEDURE — 82570 ASSAY OF URINE CREATININE: CPT

## 2024-12-19 PROCEDURE — 87086 URINE CULTURE/COLONY COUNT: CPT

## 2024-12-19 PROCEDURE — 82043 UR ALBUMIN QUANTITATIVE: CPT

## 2024-12-19 PROCEDURE — 80053 COMPREHEN METABOLIC PANEL: CPT

## 2024-12-19 PROCEDURE — 36415 COLL VENOUS BLD VENIPUNCTURE: CPT

## 2024-12-20 LAB — BACTERIA SPEC AEROBE CULT: NO GROWTH

## 2024-12-23 ENCOUNTER — OFFICE VISIT (OUTPATIENT)
Dept: FAMILY MEDICINE CLINIC | Facility: CLINIC | Age: 54
End: 2024-12-23
Payer: COMMERCIAL

## 2024-12-23 VITALS
WEIGHT: 149 LBS | OXYGEN SATURATION: 100 % | TEMPERATURE: 97.7 F | HEIGHT: 62 IN | HEART RATE: 74 BPM | DIASTOLIC BLOOD PRESSURE: 80 MMHG | SYSTOLIC BLOOD PRESSURE: 124 MMHG | BODY MASS INDEX: 27.42 KG/M2

## 2024-12-23 DIAGNOSIS — J01.10 ACUTE NON-RECURRENT FRONTAL SINUSITIS: ICD-10-CM

## 2024-12-23 DIAGNOSIS — G43.019 INTRACTABLE MIGRAINE WITHOUT AURA AND WITHOUT STATUS MIGRAINOSUS: ICD-10-CM

## 2024-12-23 DIAGNOSIS — I10 HYPERTENSION, UNSPECIFIED TYPE: ICD-10-CM

## 2024-12-23 DIAGNOSIS — E11.65 TYPE 2 DIABETES MELLITUS WITH HYPERGLYCEMIA, WITHOUT LONG-TERM CURRENT USE OF INSULIN: Primary | ICD-10-CM

## 2024-12-23 PROCEDURE — 99214 OFFICE O/P EST MOD 30 MIN: CPT | Performed by: NURSE PRACTITIONER

## 2024-12-23 RX ORDER — DAPAGLIFLOZIN 5 MG/1
5 TABLET, FILM COATED ORAL DAILY
Qty: 90 TABLET | Refills: 1 | Status: SHIPPED | OUTPATIENT
Start: 2024-12-23

## 2024-12-23 NOTE — PROGRESS NOTES
Answers submitted by the patient for this visit:  Primary Reason for Visit (Submitted on 12/19/2024)  What is the primary reason for your visit?: Diabetes  Diabetes Questionnaire (Submitted on 12/19/2024)  Chief Complaint: Diabetes problem  Diabetes type: type 2  MedicAlert ID: No  Symptom course: stable  blurred vision: No  foot paresthesias: No  foot ulcerations: No  polydipsia: No  polyuria: No  weight loss: No  confusion: No  headaches: No  speech difficulty: No  sweats: No  tremors: No  Current diet: generally unhealthy  Meal planning: none  Exercise: daily  Eye exam current: No  Sees podiatrist: No  Chief Complaint  Hypertension, Hyperlipidemia, and Headache (6 month follow up)    Subjective        Laila Bell presents to [facility]  History of Present Illness  Headache:  has been stress with loss of mom.    Diabetes:  Doing well on medication.    Hyperlipidemia:  Doing well on medication.    Grief lost mom and dad close together in the last year.  Stress with the estate and losing parents quickly without preparation.            Hypertension  Associated symptoms include headaches. Pertinent negatives include no blurred vision, chest pain or sweats. There is no history of CVA, PVD or retinopathy.   Hyperlipidemia  Pertinent negatives include no chest pain.   Headache  Diabetes  She has type 2 diabetes mellitus. No MedicAlert identification noted. The initial diagnosis of diabetes was made 8 years ago. Hypoglycemia symptoms include headaches. Pertinent negatives for hypoglycemia include no confusion, speech difficulty, sweats or tremors. Associated symptoms include fatigue. Pertinent negatives for diabetes include no blurred vision, no chest pain, no foot paresthesias, no foot ulcerations, no polydipsia, no polyuria and no weight loss. Symptoms are stable. Pertinent negatives for diabetic complications include no CVA, PVD or retinopathy. She is compliant with treatment  all of the time. She is following a generally unhealthy diet. When asked about meal planning, she reported none. She participates in exercise daily. She monitors blood glucose at home 3-4 x per week. Her highest blood glucose is 130-140 mg/dl. She does not see a podiatrist. Eye exam is not current.   Sinusitis  Associated symptoms include headaches.   Fatigue  Symptoms include fatigue and headaches.    Pertinent negative symptoms include no chest pain.       The following portions of the patient's history were personally reviewed and updated as appropriate: allergies, current medications, past medical history, past surgical history, past family history, and past social history.     Body mass index is 27.24 kg/m².           Past History:    Medical History: has a past medical history of Abnormal ECG (08/16/2016), Abnormal Pap smear of cervix (2011), Acid reflux, Allergic rhinitis, Breast mass (July 2024), Bursitis of left hip (04/06/2018), Cholelithiasis (2002), Colon polyps, Constipation, CTS (carpal tunnel syndrome) (1998), Diabetes mellitus, type 2 (05/08/2019), Diarrhea, DM2 (diabetes mellitus, type 2) (05/08/2019), Fatigue, Frequent headaches, Gastroesophageal reflux, GERD (gastroesophageal reflux disease), Gestational diabetes, Headache, tension-type (1989), History of screening mammography (02/2019), HLD (hyperlipidemia), HPV in female (2013), Hyperlipidemia, Hypertension, Hypotension, IBS (irritable bowel syndrome), LGSIL on Pap smear of cervix (2012), LGSIL on Pap smear of cervix (2011), Menorrhagia, Migraine, Mild dysplasia of cervix (DOMINICK I), Mild dysplasia of cervix (DOMINICK I) (2011), Numbness and tingling, Pain of left hip joint (04/06/2018), Peripheral neuropathy, PONV (postoperative nausea and vomiting), Screening mammogram, encounter for (02/2019), Sinus trouble, Spinal headache, Tendinitis of left hip (04/06/2018), Urinary frequency, and Urinary urgency.     Surgical History: has a past surgical history  that includes Colonoscopy (2020); Esophagogastroduodenoscopy (2020); Abdominal hysterectomy; Hysterectomy (2004); Tonsillectomy (1990); Cholecystectomy; Mammo stereotactic breast biopsy 1st w wo device (Right, 2007); Colposcopy (03/23/2011); Laparoscopic tubal ligation w/ Falope ring (2003); d & c hysteroscopy endometrial ablation; Colonoscopy (2015, 2020); Carpal tunnel release (1999); Tumor removal (2019); Upper gastrointestinal endoscopy (2020); Colonoscopy (N/A, 06/13/2023); Brain surgery (2019); Endometrial ablation (2003); Breast biopsy (July 2024); Breast surgery (8/30/24); and Breast biopsy (Left, 8/30/2024).     Family History: family history includes Arthritis in her father and mother; Breast cancer in her mother; Cancer in her father and mother; Cirrhosis in her father; Colon cancer (age of onset: 40) in her paternal grandfather; Colon polyps in her paternal grandfather; Diabetes in her father; Heart disease in her father and paternal grandfather; Hypertension in her father; Kidney disease in her paternal grandfather; Liver cancer in her father and another family member; Liver disease in her father and mother; Lung cancer in an other family member; Migraines in her maternal aunt, maternal grandmother, mother, and paternal aunt; Neuropathy in her mother; Uterine cancer in her maternal grandmother.     Social History: reports that she has never smoked. She has never been exposed to tobacco smoke. She has never used smokeless tobacco. She reports that she does not drink alcohol and does not use drugs.    Allergies: Dulaglutide          Current Outpatient Medications:     anastrozole (ARIMIDEX) 1 MG tablet, Take 1 tablet by mouth Daily., Disp: 90 tablet, Rfl: 1    atorvastatin (LIPITOR) 10 MG tablet, TAKE 1 TABLET BY MOUTH EVERY NIGHT AT BEDTIME, Disp: 90 tablet, Rfl: 1    Calcium Carb-Cholecalciferol (Calcium 600+D) 600-20 MG-MCG tablet, Take 1 tablet by mouth Daily., Disp: 90 tablet, Rfl: 1    Continuous  "Blood Gluc Sensor (FreeStyle Los 3 Sensor) misc, 1 each Every 14 (Fourteen) Days., Disp: 3 each, Rfl: 5    Emgality 120 MG/ML auto-injector pen, Inject 1 mL under the skin into the appropriate area as directed 1 (One) Time., Disp: , Rfl:     lansoprazole (PREVACID) 30 MG capsule, TAKE 1 CAPSULE BY MOUTH DAILY, Disp: 90 capsule, Rfl: 5    metoprolol succinate XL (TOPROL-XL) 25 MG 24 hr tablet, TAKE 2 TABLETS BY MOUTH DAILY (Patient taking differently: Take 1 tablet by mouth 2 (Two) Times a Day.), Disp: 180 tablet, Rfl: 1    ondansetron (ZOFRAN) 8 MG tablet, Take 1 tablet by mouth Every 8 (Eight) Hours As Needed for Nausea or Vomiting., Disp: 30 tablet, Rfl: 1    SUMAtriptan (Imitrex) 100 MG tablet, Take 1 tablet by mouth As Needed for Migraine., Disp: 12 tablet, Rfl: 11    traZODone (DESYREL) 100 MG tablet, TAKE 1 TABLET BY MOUTH EVERY NIGHT, Disp: 90 tablet, Rfl: 1    amoxicillin-clavulanate (AUGMENTIN) 875-125 MG per tablet, Take 1 tablet by mouth 2 (Two) Times a Day., Disp: 20 tablet, Rfl: 0    dapagliflozin (FARXIGA) 5 MG tablet tablet, Take 1 tablet by mouth Daily., Disp: 90 tablet, Rfl: 1    Medications Discontinued During This Encounter   Medication Reason    Ozempic, 1 MG/DOSE, 4 MG/3ML solution pen-injector Side effects         Review of Systems   Constitutional:  Positive for fatigue. Negative for unexpected weight loss.   Eyes:  Negative for blurred vision.   Cardiovascular:  Negative for chest pain.   Endocrine: Negative for polydipsia and polyuria.   Neurological:  Negative for tremors, speech difficulty and confusion.        Objective         Vitals:    12/23/24 0924   BP: 124/80   BP Location: Right arm   Patient Position: Sitting   Cuff Size: Adult   Pulse: 74   Temp: 97.7 °F (36.5 °C)   TempSrc: Temporal   SpO2: 100%   Weight: 67.6 kg (149 lb)   Height: 157.5 cm (62.01\")     Body mass index is 27.24 kg/m².         Physical Exam  Vitals reviewed.   Constitutional:       Appearance: Normal " appearance. She is well-developed.   HENT:      Head: Normocephalic and atraumatic.      Mouth/Throat:      Pharynx: No oropharyngeal exudate.   Eyes:      Conjunctiva/sclera: Conjunctivae normal.      Pupils: Pupils are equal, round, and reactive to light.   Cardiovascular:      Rate and Rhythm: Normal rate and regular rhythm.      Heart sounds: Normal heart sounds. No murmur heard.     No friction rub. No gallop.   Pulmonary:      Effort: Pulmonary effort is normal.      Breath sounds: Normal breath sounds. No wheezing or rhonchi.   Skin:     General: Skin is warm and dry.   Neurological:      Mental Status: She is alert and oriented to person, place, and time.   Psychiatric:         Mood and Affect: Mood and affect normal.         Behavior: Behavior normal.         Thought Content: Thought content normal.         Judgment: Judgment normal.             Result Review :               Assessment and Plan     Diagnoses and all orders for this visit:    1. Type 2 diabetes mellitus with hyperglycemia, without long-term current use of insulin (Primary)  -     dapagliflozin (FARXIGA) 5 MG tablet tablet; Take 1 tablet by mouth Daily.  Dispense: 90 tablet; Refill: 1  -     CBC & Differential; Future  -     Comprehensive Metabolic Panel; Future  -     Hemoglobin A1c; Future  -     Urinalysis With Culture If Indicated -; Future  -     Lipid Panel; Future  -     Microalbumin / Creatinine Urine Ratio - Urine, Clean Catch; Future    2. Hypertension, unspecified type    3. Intractable migraine without aura and without status migrainosus    4. Acute non-recurrent frontal sinusitis  -     amoxicillin-clavulanate (AUGMENTIN) 875-125 MG per tablet; Take 1 tablet by mouth 2 (Two) Times a Day.  Dispense: 20 tablet; Refill: 0        Will hold arimidex to see if feels better overall and if does will discuss with Dr. Lowe other option.      Follow Up     Return in about 6 months (around 6/23/2025).    Patient was given instructions and  counseling regarding her condition or for health maintenance advice. Please see specific information pulled into the AVS if appropriate.

## 2025-01-14 ENCOUNTER — OFFICE VISIT (OUTPATIENT)
Dept: ONCOLOGY | Facility: HOSPITAL | Age: 55
End: 2025-01-14
Payer: COMMERCIAL

## 2025-01-14 VITALS
BODY MASS INDEX: 27.51 KG/M2 | SYSTOLIC BLOOD PRESSURE: 108 MMHG | WEIGHT: 149.47 LBS | HEART RATE: 83 BPM | TEMPERATURE: 97.8 F | RESPIRATION RATE: 18 BRPM | DIASTOLIC BLOOD PRESSURE: 71 MMHG | OXYGEN SATURATION: 98 % | HEIGHT: 62 IN

## 2025-01-14 DIAGNOSIS — T45.1X5A HOT FLASHES RELATED TO AROMATASE INHIBITOR THERAPY: ICD-10-CM

## 2025-01-14 DIAGNOSIS — D05.12 DUCTAL CARCINOMA IN SITU (DCIS) OF LEFT BREAST: Primary | ICD-10-CM

## 2025-01-14 DIAGNOSIS — R23.2 HOT FLASHES RELATED TO AROMATASE INHIBITOR THERAPY: ICD-10-CM

## 2025-01-14 PROCEDURE — G0463 HOSPITAL OUTPT CLINIC VISIT: HCPCS | Performed by: INTERNAL MEDICINE

## 2025-01-14 RX ORDER — FEZOLINETANT 45 MG/1
45 TABLET, FILM COATED ORAL EVERY 24 HOURS
Qty: 30 TABLET | Refills: 3 | Status: SHIPPED | OUTPATIENT
Start: 2025-01-14

## 2025-01-14 NOTE — PATIENT INSTRUCTIONS
Stop anastrozole for two weeks to see how your emotions do not being on it. Call us after the two week break to let us know how it went, if we need to switch drugs, etc.

## 2025-01-14 NOTE — PROGRESS NOTES
Chief Complaint  FOLLOW UP 2    Mychal Calvillo APRN Payne, Chandra, APRN    [facility]          Laila Gardenia Bell presents to [facility] for DCIS    Patient is a very pleasant 53-year-old female with past medical history of allergic rhinitis, gastroesophageal reflux disease, hypertension, hyperlipidemia, irritable bowel syndrome who presents for oncology follow up regarding DCIS of the left breast.  She underwent MRI of the breast for history of dense breast tissue 7/2/2024 revealed a 0.8 cm enhancing mass in the central left breast, 6 cm from the nipple.  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  She underwent lumpectomy on 8/30/2024 revealing low-grade DCIS measuring 6 mm in greatest dimension with negative margins, ER positive/GA positive.      Interval History  Patient is here for follow up.  She remains on anastrozole.  She reports the physical side effects from anastrozole are better.  Headaches are better.  Reports sleep is better.  Still having hot flashes however. Taking vitamin E with minimal relief. She does report more mood instability.  She is not sure if this is from the anastrozole or from something else.  No breast related complaints.  No fevers or chills or infections reported.  Will try 2 weeks off of medicine to see if this helps.    Oncology/Hematology History Overview Note   7/2/24: MRI breast (screening due to family history breast cancer)  revealed a 0.8 cm enhancing mass in the central left breast, 6 cm from the nipple.      7/24/24: Pathology from MRI guided core needle biopsy revealed atypical ductal hyperplasia with an intraductal papilloma.      8/30/24: Left breast lumpectomy with pathology revealing low-grade DCIS measuring 6 mm in greatest dimension with negative margins, ER positive/GA positive.      9/30/24: Orders written for anastrozole 1 mg  daily.     Adjuvant radiation planned.         Ductal carcinoma in situ (DCIS) of left breast   9/30/2024 Initial Diagnosis    Ductal carcinoma in situ (DCIS) of left breast     9/30/2024 Cancer Staged    Staging form: Breast, AJCC 8th Edition  - Pathologic: Stage Unknown (pTis (DCIS), pNX, cM0, ER+, MD+) - Signed by Jed Lowe MD on 9/30/2024     Intraductal carcinoma in situ of left breast   10/3/2024 Initial Diagnosis    Intraductal carcinoma in situ of left breast     10/10/2024 - 10/31/2024 Radiation    Radiation OncologyTreatment Course:  Laila Bell received 4256 cGy in 16 fractions to the left breast.               Review of Systems   Constitutional:  Negative for appetite change, diaphoresis, fatigue, fever, unexpected weight gain and unexpected weight loss.   HENT:  Negative for hearing loss, mouth sores, sore throat, swollen glands, trouble swallowing and voice change.    Eyes:  Negative for blurred vision.   Respiratory:  Negative for cough, shortness of breath and wheezing.    Cardiovascular:  Negative for chest pain and palpitations.   Gastrointestinal:  Negative for abdominal pain, blood in stool, constipation, diarrhea, nausea and vomiting.   Endocrine: Negative for cold intolerance and heat intolerance.   Genitourinary:  Negative for difficulty urinating, dysuria, frequency, hematuria and urinary incontinence.   Musculoskeletal:  Negative for arthralgias, back pain and myalgias.   Skin:  Negative for rash, skin lesions and wound.        Pt has burns on skins from radiation    Neurological:  Negative for dizziness, seizures, weakness, numbness and headache.   Hematological:  Does not bruise/bleed easily.   Psychiatric/Behavioral:  Negative for depressed mood. The patient is not nervous/anxious.    All other systems reviewed and are negative.    Current Outpatient Medications on File Prior to Visit   Medication Sig Dispense Refill    amoxicillin-clavulanate (AUGMENTIN) 875-125 MG  per tablet Take 1 tablet by mouth 2 (Two) Times a Day. 20 tablet 0    anastrozole (ARIMIDEX) 1 MG tablet Take 1 tablet by mouth Daily. 90 tablet 1    atorvastatin (LIPITOR) 10 MG tablet TAKE 1 TABLET BY MOUTH EVERY NIGHT AT BEDTIME 90 tablet 1    Calcium Carb-Cholecalciferol (Calcium 600+D) 600-20 MG-MCG tablet Take 1 tablet by mouth Daily. 90 tablet 1    Continuous Blood Gluc Sensor (FreeStyle Los 3 Sensor) misc 1 each Every 14 (Fourteen) Days. 3 each 5    dapagliflozin (FARXIGA) 5 MG tablet tablet Take 1 tablet by mouth Daily. 90 tablet 1    Emgality 120 MG/ML auto-injector pen Inject 1 mL under the skin into the appropriate area as directed 1 (One) Time.      lansoprazole (PREVACID) 30 MG capsule TAKE 1 CAPSULE BY MOUTH DAILY 90 capsule 5    metoprolol succinate XL (TOPROL-XL) 25 MG 24 hr tablet TAKE 2 TABLETS BY MOUTH DAILY (Patient taking differently: Take 1 tablet by mouth 2 (Two) Times a Day.) 180 tablet 1    ondansetron (ZOFRAN) 8 MG tablet Take 1 tablet by mouth Every 8 (Eight) Hours As Needed for Nausea or Vomiting. (Patient not taking: Reported on 1/14/2025) 30 tablet 1    SUMAtriptan (Imitrex) 100 MG tablet Take 1 tablet by mouth As Needed for Migraine. 12 tablet 11    traZODone (DESYREL) 100 MG tablet TAKE 1 TABLET BY MOUTH EVERY NIGHT 90 tablet 1     No current facility-administered medications on file prior to visit.       Allergies   Allergen Reactions    Dulaglutide Itching     Past Medical History:   Diagnosis Date    Abnormal ECG 08/16/2016    Abnormal Pap smear of cervix 2011    Acid reflux     Allergic rhinitis     Breast mass July 2024    Bursitis of left hip 04/06/2018    Cholelithiasis 2002    Colon polyps     Constipation     CTS (carpal tunnel syndrome) 1998    Diabetes mellitus, type 2 05/08/2019    Diarrhea     DM2 (diabetes mellitus, type 2) 05/08/2019    Fatigue     Frequent headaches     Gastroesophageal reflux     GERD (gastroesophageal reflux disease)     Gestational diabetes      Headache, tension-type 1989    History of screening mammography 02/2019 2020 scheduled    HLD (hyperlipidemia)     HPV in female 2013    Hyperlipidemia     Hypertension     Hypotension     IBS (irritable bowel syndrome)     LGSIL on Pap smear of cervix 2012    LGSIL on Pap smear of cervix 2011    Menorrhagia     Migraine     Mild dysplasia of cervix (DOMINICK I)     Mild dysplasia of cervix (DOMINICK I) 2011    Numbness and tingling     in feet    Pain of left hip joint 04/06/2018    Peripheral neuropathy     PONV (postoperative nausea and vomiting)     Screening mammogram, encounter for 02/2019 2020 SCHEDULED     Sinus trouble     Spinal headache     Tendinitis of left hip 04/06/2018    Urinary frequency     Urinary urgency      Past Surgical History:   Procedure Laterality Date    ABDOMINAL HYSTERECTOMY      BRAIN SURGERY  2019    Pituitary tumor    BREAST BIOPSY  July 2024    Left    BREAST BIOPSY Left 8/30/2024    Procedure: Left breast needle localized lumpectomy, RIGHT BACK CYST EXCISION;  Surgeon: Ana Paula Patterson MD;  Location: [facility] OR [facility];  Service: General;  Laterality: Left;    BREAST SURGERY  8/30/24    CARPAL TUNNEL RELEASE  1999    CHOLECYSTECTOMY      COLONOSCOPY  2020 2015 & 2020     COLONOSCOPY  2015, 2020    COLONOSCOPY N/A 06/13/2023    Procedure: COLONOSCOPY WITH COLD SNARE POLYPECTOMIES;  Surgeon: Jose David Berry MD;  Location: [facility] ENDOSCOPY;  Service: Gastroenterology;  Laterality: N/A;  COLON POLYPS    COLPOSCOPY  03/23/2011    D & C HYSTEROSCOPY ENDOMETRIAL ABLATION      ENDOMETRIAL ABLATION  2003    ENDOSCOPY  2020    HYSTERECTOMY  2004    LAPAROSCOPIC TUBAL LIGATION W/ FALOPE RING  2003    MAMMO STEREOTACTIC BREAST BIOPSY 1ST W WO DEVICE Right 2007    TONSILLECTOMY  1990    TUMOR REMOVAL  2019    transsphenoidal    UPPER GASTROINTESTINAL ENDOSCOPY  2020     Social History     Socioeconomic History    Marital status:    Tobacco Use    Smoking status: Never     Passive  "exposure: Never    Smokeless tobacco: Never   Vaping Use    Vaping status: Never Used   Substance and Sexual Activity    Alcohol use: Never    Drug use: Never    Sexual activity: Yes     Partners: Male     Birth control/protection: Hysterectomy, Surgical     Family History   Problem Relation Age of Onset    Hypertension Father     Heart disease Father     Diabetes Father     Arthritis Father     Liver disease Father     Cirrhosis Father     Liver cancer Father     Cancer Father         Liver    Breast cancer Mother     Arthritis Mother     Liver disease Mother     Migraines Mother     Neuropathy Mother     Cancer Mother         Inflammatory Breast Cancer    Kidney disease Paternal Grandfather     Heart disease Paternal Grandfather     Colon cancer Paternal Grandfather 40    Colon polyps Paternal Grandfather     Uterine cancer Maternal Grandmother     Migraines Maternal Grandmother     Migraines Maternal Aunt     Migraines Paternal Aunt     Liver cancer Other     Lung cancer Other     Ovarian cancer Neg Hx     Melanoma Neg Hx     Prostate cancer Neg Hx     Deep vein thrombosis Neg Hx        Objective   Physical Exam  Well appearing patient in no acute distress on RA  Anicteric sclerae, no rash on exposed skin  Respirations non-labored  Awake, alert, and oriented x 4. Speech intact. No gross neurologic deficit  Appropriate mood and affect    Vitals:    01/14/25 1504   Pulse: 83   Resp: 18   Temp: 97.8 °F (36.6 °C)   TempSrc: Temporal   SpO2: 98%   Weight: 67.8 kg (149 lb 7.6 oz)   Height: 157.5 cm (62.01\")   PainSc: 0-No pain       ECOG score: 0         PHQ-9 Total Score:              Result Review :   The following data was reviewed by: Jed Lowe MD on 01/14/25:  Lab Results   Component Value Date    HGB 13.4 12/19/2024    HCT 38.5 12/19/2024    MCV 93.2 12/19/2024     12/19/2024    WBC 5.23 12/19/2024    NEUTROABS 3.61 12/19/2024    LYMPHSABS 0.99 12/19/2024    MONOSABS 0.38 12/19/2024    EOSABS " 0.22 12/19/2024    BASOSABS 0.02 12/19/2024     Lab Results   Component Value Date    GLUCOSE 181 (H) 12/19/2024    BUN 9 12/19/2024    CREATININE 0.87 12/19/2024     12/19/2024    K 3.9 12/19/2024     12/19/2024    CO2 28.6 12/19/2024    CALCIUM 9.4 12/19/2024    PROTEINTOT 6.3 12/19/2024    ALBUMIN 4.2 12/19/2024    BILITOT 0.8 12/19/2024    ALKPHOS 113 12/19/2024    AST 23 12/19/2024    ALT 16 12/19/2024     Lab Results   Component Value Date    MG 1.6 04/25/2019    FREET4 1.15 06/20/2023    TSH 1.470 06/20/2023       No radiology results for the last 30 days.        Assessment and Plan    Diagnoses and all orders for this visit:    1. Ductal carcinoma in situ (DCIS) of left breast (Primary)  -     Fezolinetant (Veozah) 45 MG tablet; Take 1 tablet by mouth Daily.  Dispense: 30 tablet; Refill: 3    2. Hot flashes related to aromatase inhibitor therapy  -     Fezolinetant (Veozah) 45 MG tablet; Take 1 tablet by mouth Daily.  Dispense: 30 tablet; Refill: 3        Left breast DCIS  Lumpectomy 8/30/24 with low grade DCIS. ER/KS positive. Completed adjuvant radiation.  Due to hormone receptor positivity remaining breast tissue recommend adjuvant hormonal therapy.  9/30/24 FSH and estradiol confirmed post-menopausal status. Started adjuvant aromatase inhibitor with anastrozole 1 mg daily at that time. Side effects of nausea, headache, insomnia, hot flashes. 1/14/25: Reports these have improved but she has had more mood instability. Could consider effexor that helps with both mood and hot flashes vs holding med x 2 weeks. Will plan to have her hold anastrozole x 2 weeks. If mental symptoms improve, will switch her to letrozole.     Recommend vitamin D and calcium. 10/2024 baseline DEXA scan normal, repeat q2 years.    RTC 6 weeks.     Hot flashes  Start veozah daily. Also ok for vitamin E and black cohosh.      I spent 25 minutes caring for Laila on this date of service. This time includes time spent by me in  the following activities:preparing for the visit, reviewing tests, obtaining and/or reviewing a separately obtained history, performing a medically appropriate examination and/or evaluation , counseling and educating the patient/family/caregiver, ordering medications, tests, or procedures, referring and communicating with other health care professionals , documenting information in the medical record, independently interpreting results and communicating that information with the patient/family/caregiver, and care coordination    Patient Follow Up: 6 weeks  Patient was given instructions and counseling regarding her condition or for health maintenance advice. Please see specific information pulled into the AVS if appropriate.

## 2025-01-22 ENCOUNTER — PATIENT MESSAGE (OUTPATIENT)
Dept: FAMILY MEDICINE CLINIC | Facility: CLINIC | Age: 55
End: 2025-01-22
Payer: COMMERCIAL

## 2025-01-22 ENCOUNTER — HOSPITAL ENCOUNTER (EMERGENCY)
Facility: HOSPITAL | Age: 55
Discharge: HOME | End: 2025-01-22
Attending: EMERGENCY MEDICINE
Payer: COMMERCIAL

## 2025-01-22 ENCOUNTER — APPOINTMENT (OUTPATIENT)
Dept: GENERAL RADIOLOGY | Facility: HOSPITAL | Age: 55
End: 2025-01-22
Payer: COMMERCIAL

## 2025-01-22 VITALS
HEART RATE: 60 BPM | BODY MASS INDEX: 27.22 KG/M2 | DIASTOLIC BLOOD PRESSURE: 69 MMHG | OXYGEN SATURATION: 99 % | WEIGHT: 147.93 LBS | RESPIRATION RATE: 16 BRPM | SYSTOLIC BLOOD PRESSURE: 106 MMHG | TEMPERATURE: 98.4 F | HEIGHT: 62 IN

## 2025-01-22 DIAGNOSIS — R07.9 NONSPECIFIC CHEST PAIN: Primary | ICD-10-CM

## 2025-01-22 DIAGNOSIS — Z79.4 TYPE 2 DIABETES MELLITUS WITH HYPERGLYCEMIA, WITH LONG-TERM CURRENT USE OF INSULIN: ICD-10-CM

## 2025-01-22 DIAGNOSIS — E11.65 TYPE 2 DIABETES MELLITUS WITH HYPERGLYCEMIA, WITHOUT LONG-TERM CURRENT USE OF INSULIN: Primary | ICD-10-CM

## 2025-01-22 DIAGNOSIS — E11.65 TYPE 2 DIABETES MELLITUS WITH HYPERGLYCEMIA, WITH LONG-TERM CURRENT USE OF INSULIN: ICD-10-CM

## 2025-01-22 LAB
ALBUMIN SERPL-MCNC: 4.5 G/DL (ref 3.5–5.2)
ALBUMIN/GLOB SERPL: 2 G/DL
ALP SERPL-CCNC: 120 U/L (ref 39–117)
ALT SERPL W P-5'-P-CCNC: 15 U/L (ref 1–33)
ANION GAP SERPL CALCULATED.3IONS-SCNC: 10.5 MMOL/L (ref 5–15)
AST SERPL-CCNC: 19 U/L (ref 1–32)
BASOPHILS # BLD AUTO: 0.03 10*3/MM3 (ref 0–0.2)
BASOPHILS NFR BLD AUTO: 0.5 % (ref 0–1.5)
BILIRUB SERPL-MCNC: 0.9 MG/DL (ref 0–1.2)
BUN SERPL-MCNC: 12 MG/DL (ref 6–20)
BUN/CREAT SERPL: 14.6 (ref 7–25)
CALCIUM SPEC-SCNC: 9.4 MG/DL (ref 8.6–10.5)
CHLORIDE SERPL-SCNC: 102 MMOL/L (ref 98–107)
CO2 SERPL-SCNC: 25.5 MMOL/L (ref 22–29)
CREAT SERPL-MCNC: 0.82 MG/DL (ref 0.57–1)
D DIMER PPP FEU-MCNC: <0.27 MCGFEU/ML (ref 0–0.53)
DEPRECATED RDW RBC AUTO: 41.4 FL (ref 37–54)
EGFRCR SERPLBLD CKD-EPI 2021: 85.7 ML/MIN/1.73
EOSINOPHIL # BLD AUTO: 0.31 10*3/MM3 (ref 0–0.4)
EOSINOPHIL NFR BLD AUTO: 5.6 % (ref 0.3–6.2)
ERYTHROCYTE [DISTWIDTH] IN BLOOD BY AUTOMATED COUNT: 12.8 % (ref 12.3–15.4)
GEN 5 1HR TROPONIN T REFLEX: 6 NG/L
GLOBULIN UR ELPH-MCNC: 2.3 GM/DL
GLUCOSE SERPL-MCNC: 249 MG/DL (ref 65–99)
HCT VFR BLD AUTO: 42.7 % (ref 34–46.6)
HGB BLD-MCNC: 14.5 G/DL (ref 12–15.9)
HOLD SPECIMEN: NORMAL
HOLD SPECIMEN: NORMAL
IMM GRANULOCYTES # BLD AUTO: 0.01 10*3/MM3 (ref 0–0.05)
IMM GRANULOCYTES NFR BLD AUTO: 0.2 % (ref 0–0.5)
LIPASE SERPL-CCNC: 35 U/L (ref 13–60)
LYMPHOCYTES # BLD AUTO: 0.88 10*3/MM3 (ref 0.7–3.1)
LYMPHOCYTES NFR BLD AUTO: 15.8 % (ref 19.6–45.3)
MAGNESIUM SERPL-MCNC: 2 MG/DL (ref 1.6–2.6)
MCH RBC QN AUTO: 30.8 PG (ref 26.6–33)
MCHC RBC AUTO-ENTMCNC: 34 G/DL (ref 31.5–35.7)
MCV RBC AUTO: 90.7 FL (ref 79–97)
MONOCYTES # BLD AUTO: 0.34 10*3/MM3 (ref 0.1–0.9)
MONOCYTES NFR BLD AUTO: 6.1 % (ref 5–12)
NEUTROPHILS NFR BLD AUTO: 3.99 10*3/MM3 (ref 1.7–7)
NEUTROPHILS NFR BLD AUTO: 71.8 % (ref 42.7–76)
NRBC BLD AUTO-RTO: 0 /100 WBC (ref 0–0.2)
NT-PROBNP SERPL-MCNC: <36 PG/ML (ref 0–900)
PLATELET # BLD AUTO: 171 10*3/MM3 (ref 140–450)
PMV BLD AUTO: 9.4 FL (ref 6–12)
POTASSIUM SERPL-SCNC: 4 MMOL/L (ref 3.5–5.2)
PROT SERPL-MCNC: 6.8 G/DL (ref 6–8.5)
QT INTERVAL: 465 MS
QTC INTERVAL: 424 MS
RBC # BLD AUTO: 4.71 10*6/MM3 (ref 3.77–5.28)
SODIUM SERPL-SCNC: 138 MMOL/L (ref 136–145)
TROPONIN T NUMERIC DELTA: NORMAL
TROPONIN T SERPL HS-MCNC: <6 NG/L
WBC NRBC COR # BLD AUTO: 5.56 10*3/MM3 (ref 3.4–10.8)
WHOLE BLOOD HOLD COAG: NORMAL
WHOLE BLOOD HOLD SPECIMEN: NORMAL

## 2025-01-22 PROCEDURE — 84484 ASSAY OF TROPONIN QUANT: CPT | Performed by: EMERGENCY MEDICINE

## 2025-01-22 PROCEDURE — 83735 ASSAY OF MAGNESIUM: CPT | Performed by: EMERGENCY MEDICINE

## 2025-01-22 PROCEDURE — 83690 ASSAY OF LIPASE: CPT | Performed by: EMERGENCY MEDICINE

## 2025-01-22 PROCEDURE — 93005 ELECTROCARDIOGRAM TRACING: CPT | Performed by: EMERGENCY MEDICINE

## 2025-01-22 PROCEDURE — 99284 EMERGENCY DEPT VISIT MOD MDM: CPT

## 2025-01-22 PROCEDURE — 80053 COMPREHEN METABOLIC PANEL: CPT | Performed by: EMERGENCY MEDICINE

## 2025-01-22 PROCEDURE — 93005 ELECTROCARDIOGRAM TRACING: CPT

## 2025-01-22 PROCEDURE — 71045 X-RAY EXAM CHEST 1 VIEW: CPT

## 2025-01-22 PROCEDURE — 93010 ELECTROCARDIOGRAM REPORT: CPT | Performed by: SPECIALIST

## 2025-01-22 PROCEDURE — 85379 FIBRIN DEGRADATION QUANT: CPT | Performed by: EMERGENCY MEDICINE

## 2025-01-22 PROCEDURE — 85025 COMPLETE CBC W/AUTO DIFF WBC: CPT | Performed by: EMERGENCY MEDICINE

## 2025-01-22 PROCEDURE — 36415 COLL VENOUS BLD VENIPUNCTURE: CPT

## 2025-01-22 PROCEDURE — 83880 ASSAY OF NATRIURETIC PEPTIDE: CPT | Performed by: EMERGENCY MEDICINE

## 2025-01-22 RX ORDER — ACYCLOVIR 800 MG/1
1 TABLET ORAL
Qty: 3 EACH | Refills: 5 | Status: SHIPPED | OUTPATIENT
Start: 2025-01-22 | End: 2025-01-23 | Stop reason: CLARIF

## 2025-01-22 RX ORDER — SODIUM CHLORIDE 0.9 % (FLUSH) 0.9 %
10 SYRINGE (ML) INJECTION AS NEEDED
Status: DISCONTINUED | OUTPATIENT
Start: 2025-01-22 | End: 2025-01-22 | Stop reason: HOSPADM

## 2025-01-22 RX ORDER — ASPIRIN 81 MG/1
324 TABLET, CHEWABLE ORAL ONCE
Status: COMPLETED | OUTPATIENT
Start: 2025-01-22 | End: 2025-01-22

## 2025-01-22 RX ADMIN — ASPIRIN 324 MG: 81 TABLET, CHEWABLE ORAL at 06:20

## 2025-01-22 NOTE — ED TRIAGE NOTES
Pt arrives to ED via POV with complaint of left sided chest pain that radiates to the left arm that has been intermittent since yesterday. Pain is currently 06 /10. Pt denies cardiac history. Pt did recieve radiation to the left breast where the pain is occuring with the last treatment in October, 2024. Pt states the pain is constant. VSS, NAD.

## 2025-01-22 NOTE — Clinical Note
Pt nonverbal cognitively impaired resident of a [facility]. She presented to UC 1 hour prior to arrival to ED.  Went home after UC and staff reports she was \"short of breath with activity\". They provided her with 2 puff of albuterol inhaler.    [facility] EMERGENCY ROOM  [address] [place] [address]  Phone: [phone]  Fax: [phone]    Laila Bell was seen and treated in our emergency department on 1/22/2025.  She may return to work on 01/23/2025.         Thank you for choosing [facility].    Juan Pablo Sifuentes MD

## 2025-01-22 NOTE — ED PROVIDER NOTES
"Time: 6:36 AM EST  Date of encounter:  1/22/2025  Independent Historian/Clinical History and Information was obtained by:   Patient    History is limited by: N/A    Chief Complaint: Chest pain      History of Present Illness:  Patient is a 54 y.o. year old female who presents to the emergency department for evaluation of chest pain.  Patient states for the past 4 days she was having left shoulder pain that radiated down her arm and up into her neck.  She describes it as a \"stinging\" sensation.  No known injury.  Patient states the pain was not reproducible with movement.  Now since 330 this morning she has developed some chest and abdominal discomfort that she describes as epigastric and left lower chest region below her breast.  She has had some nausea and lightheadedness.  She states she is mildly tender to touch in her epigastric region.  Does not drink alcohol and has no obvious food trigger for her symptoms.  Denies left upper extremity numbness tingling or weakness.      Patient Care Team  Primary Care Provider: Mychal Calvillo APRN    Past Medical History:     Allergies   Allergen Reactions    Dulaglutide Itching     Past Medical History:   Diagnosis Date    Abnormal ECG 08/16/2016    Abnormal Pap smear of cervix 2011    Acid reflux     Allergic rhinitis     Breast mass July 2024    Bursitis of left hip 04/06/2018    Cholelithiasis 2002    Colon polyps     Constipation     CTS (carpal tunnel syndrome) 1998    Diabetes mellitus, type 2 05/08/2019    Diarrhea     DM2 (diabetes mellitus, type 2) 05/08/2019    Fatigue     Frequent headaches     Gastroesophageal reflux     GERD (gastroesophageal reflux disease)     Gestational diabetes     Headache, tension-type 1989    History of screening mammography 02/2019 2020 scheduled    HLD (hyperlipidemia)     HPV in female 2013    Hyperlipidemia     Hypertension     Hypotension     IBS (irritable bowel syndrome)     LGSIL on Pap smear of cervix 2012    LGSIL on Pap " smear of cervix 2011    Menorrhagia     Migraine     Mild dysplasia of cervix (DOMINICK I)     Mild dysplasia of cervix (DOMINICK I) 2011    Numbness and tingling     in feet    Pain of left hip joint 04/06/2018    Peripheral neuropathy     PONV (postoperative nausea and vomiting)     Screening mammogram, encounter for 02/2019 2020 SCHEDULED     Sinus trouble     Spinal headache     Tendinitis of left hip 04/06/2018    Urinary frequency     Urinary urgency      Past Surgical History:   Procedure Laterality Date    ABDOMINAL HYSTERECTOMY      BRAIN SURGERY  2019    Pituitary tumor    BREAST BIOPSY  July 2024    Left    BREAST BIOPSY Left 8/30/2024    Procedure: Left breast needle localized lumpectomy, RIGHT BACK CYST EXCISION;  Surgeon: Ana Paula Patterson MD;  [place] [facility] [place] [facility] [place]  Service: General;  Laterality: Left;    BREAST SURGERY  8/30/24    CARPAL TUNNEL RELEASE  1999    CHOLECYSTECTOMY      COLONOSCOPY  2020 2015 & 2020     COLONOSCOPY  2015, 2020    COLONOSCOPY N/A 06/13/2023    Procedure: COLONOSCOPY WITH COLD SNARE POLYPECTOMIES;  Surgeon: Jose David Berry MD;  Location: [facility] ENDOSCOPY;  Service: Gastroenterology;  Laterality: N/A;  COLON POLYPS    COLPOSCOPY  03/23/2011    D & C HYSTEROSCOPY ENDOMETRIAL ABLATION      ENDOMETRIAL ABLATION  2003    ENDOSCOPY  2020    HYSTERECTOMY  2004    LAPAROSCOPIC TUBAL LIGATION W/ FALOPE RING  2003    MAMMO STEREOTACTIC BREAST BIOPSY 1ST W WO DEVICE Right 2007    TONSILLECTOMY  1990    TUMOR REMOVAL  2019    transsphenoidal    UPPER GASTROINTESTINAL ENDOSCOPY  2020     Family History   Problem Relation Age of Onset    Hypertension Father     Heart disease Father     Diabetes Father     Arthritis Father     Liver disease Father     Cirrhosis Father     Liver cancer Father     Cancer Father         Liver    Breast cancer Mother     Arthritis Mother     Liver disease Mother     Migraines Mother     Neuropathy Mother     Cancer Mother         Inflammatory Breast  Cancer    Kidney disease Paternal Grandfather     Heart disease Paternal Grandfather     Colon cancer Paternal Grandfather 40    Colon polyps Paternal Grandfather     Uterine cancer Maternal Grandmother     Migraines Maternal Grandmother     Migraines Maternal Aunt     Migraines Paternal Aunt     Liver cancer Other     Lung cancer Other     Ovarian cancer Neg Hx     Melanoma Neg Hx     Prostate cancer Neg Hx     Deep vein thrombosis Neg Hx        Home Medications:  Prior to Admission medications    Medication Sig Start Date End Date Taking? Authorizing Provider   amoxicillin-clavulanate (AUGMENTIN) 875-125 MG per tablet Take 1 tablet by mouth 2 (Two) Times a Day. 12/23/24   Myhcal Calvillo APRN   anastrozole (ARIMIDEX) 1 MG tablet Take 1 tablet by mouth Daily. 11/7/24   Jed Lowe MD   atorvastatin (LIPITOR) 10 MG tablet TAKE 1 TABLET BY MOUTH EVERY NIGHT AT BEDTIME 5/20/24   Mychal Calvillo APRN   Calcium Carb-Cholecalciferol (Calcium 600+D) 600-20 MG-MCG tablet Take 1 tablet by mouth Daily. 9/30/24   Jed Lowe MD   Continuous Blood Gluc Sensor (FreeStyle Los 3 Sensor) misc 1 each Every 14 (Fourteen) Days. 4/27/23   Mychal Calvillo APRN   dapagliflozin (FARXIGA) 5 MG tablet tablet Take 1 tablet by mouth Daily. 12/23/24   Mychal Calvillo APRN   Emgality 120 MG/ML auto-injector pen Inject 1 mL under the skin into the appropriate area as directed 1 (One) Time. 9/13/24   ProviderDane MD   Fezolinetant (Veozah) 45 MG tablet Take 1 tablet by mouth Daily. 1/14/25   Jed Lowe MD   lansoprazole (PREVACID) 30 MG capsule TAKE 1 CAPSULE BY MOUTH DAILY 6/21/24   Janeen Hays APRN   metoprolol succinate XL (TOPROL-XL) 25 MG 24 hr tablet TAKE 2 TABLETS BY MOUTH DAILY  Patient taking differently: Take 1 tablet by mouth 2 (Two) Times a Day. 8/23/24   Mychal Calvillo APRN   ondansetron (ZOFRAN) 8 MG tablet Take 1 tablet by mouth Every 8 (Eight) Hours As Needed for Nausea or  "Vomiting.  Patient not taking: Reported on 1/14/2025 11/7/24   Jed Lowe MD   SUMAtriptan (Imitrex) 100 MG tablet Take 1 tablet by mouth As Needed for Migraine. 6/13/24   Arline Jeronimo APRN   traZODone (DESYREL) 100 MG tablet TAKE 1 TABLET BY MOUTH EVERY NIGHT 11/18/24   Mychal Calvillo APRN        Social History:   Social History     Tobacco Use    Smoking status: Never     Passive exposure: Never    Smokeless tobacco: Never   Vaping Use    Vaping status: Never Used   Substance Use Topics    Alcohol use: Never    Drug use: Never         Review of Systems:  Review of Systems   Constitutional:  Negative for chills and fever.   HENT:  Negative for congestion, rhinorrhea and sore throat.    Eyes:  Negative for photophobia.   Respiratory:  Negative for apnea, cough, chest tightness and shortness of breath.    Cardiovascular:  Positive for chest pain. Negative for palpitations.   Gastrointestinal:  Positive for abdominal pain. Negative for diarrhea, nausea and vomiting.   Endocrine: Negative.    Genitourinary:  Negative for difficulty urinating and dysuria.   Musculoskeletal:  Positive for arthralgias. Negative for back pain, joint swelling and myalgias.   Skin:  Negative for color change and wound.   Allergic/Immunologic: Negative.    Neurological:  Positive for light-headedness. Negative for seizures, weakness, numbness and headaches.   Psychiatric/Behavioral: Negative.     All other systems reviewed and are negative.       Physical Exam:  /69   Pulse 60   Temp 98.4 °F (36.9 °C) (Oral)   Resp 16   Ht 157.5 cm (62\")   Wt 67.1 kg (147 lb 14.9 oz)   SpO2 99%   BMI 27.06 kg/m²     Physical Exam  Vitals and nursing note reviewed.   Constitutional:       General: She is awake.      Appearance: Normal appearance. She is well-developed.   HENT:      Head: Normocephalic and atraumatic.      Nose: Nose normal.      Mouth/Throat:      Mouth: Mucous membranes are moist.   Eyes:      Extraocular " Movements: Extraocular movements intact.      Pupils: Pupils are equal, round, and reactive to light.   Cardiovascular:      Rate and Rhythm: Normal rate and regular rhythm.      Heart sounds: Normal heart sounds.   Pulmonary:      Effort: Pulmonary effort is normal. No respiratory distress.      Breath sounds: Normal breath sounds. No wheezing, rhonchi or rales.   Abdominal:      General: Bowel sounds are normal.      Palpations: Abdomen is soft.      Tenderness: There is no abdominal tenderness. There is no guarding or rebound.      Comments: No rigidity   Musculoskeletal:         General: No tenderness. Normal range of motion.      Cervical back: Normal range of motion and neck supple.   Skin:     General: Skin is warm and dry.      Coloration: Skin is not jaundiced.   Neurological:      General: No focal deficit present.      Mental Status: She is alert. Mental status is at baseline.   Psychiatric:         Mood and Affect: Mood is anxious.                    Medical Decision Making:      Comorbidities that affect care:    Type 2 diabetes, hypertension, hyperlipidemia, ductal carcinoma in situ    External Notes reviewed:    Previous Clinic Note: Oncology office visit 1/14/2025 with Dr. Lowe.  Description: DCIS, left breast      The following orders were placed and all results were independently analyzed by me:  Orders Placed This Encounter   Procedures    XR Chest 1 View    [place] Draw    High Sensitivity Troponin T    Comprehensive Metabolic Panel    Lipase    BNP    Magnesium    CBC Auto Differential    D-dimer, Quantitative    High Sensitivity Troponin T 1Hr    NPO Diet NPO Type: Strict NPO    Undress & Gown    Continuous Pulse Oximetry    Oxygen Therapy- Nasal Cannula; Titrate 1-6 LPM Per SpO2; 90 - 95%    ECG 12 Lead ED Triage Standing Order; Chest Pain    ECG 12 Lead ED Triage Standing Order; Chest Pain    Insert Peripheral IV    CBC & Differential    Green Top (Gel)    Lavender Top    Gold Top - SST     Light Blue Top       Medications Given in the Emergency Department:  Medications   sodium chloride 0.9 % flush 10 mL (has no administration in time range)   aspirin chewable tablet 324 mg (324 mg Oral Given 1/22/25 0620)        ED Course:    ED Course as of 01/22/25 0830 Wed Jan 22, 2025 0640 I have personally interpreted the EKG today and it shows no evidence of any acute ischemia or heart arrhythmia. [RP]   0829 On discharge reevaluation the patient brings up without my prompting the possibility that this could be related to anxiety.  She is tearful at this time. [RP]      ED Course User Index  [RP] Juan Pablo Sifuentes MD       Labs:    Lab Results (last 24 hours)       Procedure Component Value Units Date/Time    High Sensitivity Troponin T [[id number]]  (Normal) Collected: 01/22/25 0618    Specimen: Blood Updated: 01/22/25 0644     HS Troponin T <6 ng/L     Narrative:      High Sensitive Troponin T Reference Range:  <14.0 ng/L- Negative Female for AMI  <22.0 ng/L- Negative Male for AMI  >=14 - Abnormal Female indicating possible myocardial injury.  >=22 - Abnormal Male indicating possible myocardial injury.   Clinicians would have to utilize clinical acumen, EKG, Troponin, and serial changes to determine if it is an Acute Myocardial Infarction or myocardial injury due to an underlying chronic condition.         CBC & Differential [[id number]]  (Abnormal) Collected: 01/22/25 0618    Specimen: Blood Updated: 01/22/25 0626    Narrative:      The following orders were created for panel order CBC & Differential.  Procedure                               Abnormality         Status                     ---------                               -----------         ------                     CBC Auto Differential[[id number]]        Abnormal            Final result                 Please view results for these tests on the individual orders.    Comprehensive Metabolic Panel [[id number]]  (Abnormal) Collected: 01/22/25 0618     Specimen: Blood Updated: 01/22/25 0644     Glucose 249 mg/dL      BUN 12 mg/dL      Creatinine 0.82 mg/dL      Sodium 138 mmol/L      Potassium 4.0 mmol/L      Comment: Slight hemolysis detected by analyzer. Result may be falsely elevated.        Chloride 102 mmol/L      CO2 25.5 mmol/L      Calcium 9.4 mg/dL      Total Protein 6.8 g/dL      Albumin 4.5 g/dL      ALT (SGPT) 15 U/L      AST (SGOT) 19 U/L      Alkaline Phosphatase 120 U/L      Total Bilirubin 0.9 mg/dL      Globulin 2.3 gm/dL      A/G Ratio 2.0 g/dL      BUN/Creatinine Ratio 14.6     Anion Gap 10.5 mmol/L      eGFR 85.7 mL/min/1.73     Narrative:      GFR Categories in Chronic Kidney Disease (CKD)      GFR Category          GFR (mL/min/1.73)    Interpretation  G1                     90 or greater         Normal or high (1)  G2                      60-89                Mild decrease (1)  G3a                   45-59                Mild to moderate decrease  G3b                   30-44                Moderate to severe decrease  G4                    15-29                Severe decrease  G5                    14 or less           Kidney failure          (1)In the absence of evidence of kidney disease, neither GFR category G1 or G2 fulfill the criteria for CKD.    eGFR calculation 2021 CKD-EPI creatinine equation, which does not include race as a factor    Lipase [[id number]]  (Normal) Collected: 01/22/25 0618    Specimen: Blood Updated: 01/22/25 0644     Lipase 35 U/L     BNP [[id number]]  (Normal) Collected: 01/22/25 0618    Specimen: Blood Updated: 01/22/25 0641     proBNP <36.0 pg/mL     Narrative:      This assay is used as an aid in the diagnosis of individuals suspected of having heart failure. It can be used as an aid in the diagnosis of acute decompensated heart failure (ADHF) in patients presenting with signs and symptoms of ADHF to the emergency department (ED). In addition, NT-proBNP of <300 pg/mL indicates ADHF is not likely.    Age  Range Result Interpretation  NT-proBNP Concentration (pg/mL:      <50             Positive            >450                   Gray                 300-450                    Negative             <300    50-75           Positive            >900                  Gray                300-900                  Negative            <300      >75             Positive            >1800                  Gray                300-1800                  Negative            <300    Magnesium [[id number]]  (Normal) Collected: 01/22/25 0618    Specimen: Blood Updated: 01/22/25 0644     Magnesium 2.0 mg/dL     CBC Auto Differential [[id number]]  (Abnormal) Collected: 01/22/25 0618    Specimen: Blood Updated: 01/22/25 0626     WBC 5.56 10*3/mm3      RBC 4.71 10*6/mm3      Hemoglobin 14.5 g/dL      Hematocrit 42.7 %      MCV 90.7 fL      MCH 30.8 pg      MCHC 34.0 g/dL      RDW 12.8 %      RDW-SD 41.4 fl      MPV 9.4 fL      Platelets 171 10*3/mm3      Neutrophil % 71.8 %      Lymphocyte % 15.8 %      Monocyte % 6.1 %      Eosinophil % 5.6 %      Basophil % 0.5 %      Immature Grans % 0.2 %      Neutrophils, Absolute 3.99 10*3/mm3      Lymphocytes, Absolute 0.88 10*3/mm3      Monocytes, Absolute 0.34 10*3/mm3      Eosinophils, Absolute 0.31 10*3/mm3      Basophils, Absolute 0.03 10*3/mm3      Immature Grans, Absolute 0.01 10*3/mm3      nRBC 0.0 /100 WBC     D-dimer, Quantitative [[id number]]  (Normal) Collected: 01/22/25 0618    Specimen: Blood Updated: 01/22/25 0717     D-Dimer, Quantitative <0.27 MCGFEU/mL     Narrative:      According to the assay 's published package insert, a normal (<0.50 MCGFEU/mL) D-dimer result in conjunction with a non-high clinical probability assessment, excludes deep vein thrombosis (DVT) and pulmonary embolism (PE) with high sensitivity.    D-dimer values increase with age and this can make VTE exclusion of an older population difficult. To address this, the [employer], based on  "best available evidence and recent guidelines, recommends that clinicians use age-adjusted D-dimer thresholds in patients greater than 50 years of age with: a) a low probability of PE who do not meet all Pulmonary Embolism Rule Out Criteria, or b) in those with intermediate probability of PE.   The formula for an age-adjusted D-dimer cut-off is \"age/100\".  For example, a 60 year old patient would have an age-adjusted cut-off of 0.60 MCGFEU/mL and an 80 year old 0.80 MCGFEU/mL.    High Sensitivity Troponin T 1Hr [[id number]] Collected: 01/22/25 0714    Specimen: Blood Updated: 01/22/25 0737     HS Troponin T 6 ng/L      Troponin T Numeric Delta --     Comment: Unable to calculate.       Narrative:      High Sensitive Troponin T Reference Range:  <14.0 ng/L- Negative Female for AMI  <22.0 ng/L- Negative Male for AMI  >=14 - Abnormal Female indicating possible myocardial injury.  >=22 - Abnormal Male indicating possible myocardial injury.   Clinicians would have to utilize clinical acumen, EKG, Troponin, and serial changes to determine if it is an Acute Myocardial Infarction or myocardial injury due to an underlying chronic condition.                  Imaging:    XR Chest 1 View    Result Date: 1/22/2025  XR CHEST 1 VW Date of Exam: 1/22/2025 6:12 AM EST Indication: Chest Pain Triage Protocol Comparison: 12/20/2022 Findings: Cardiac and mediastinal contours are normal. Pulmonary vascularity is normal. The lungs are clear. No pneumothorax.     No active disease. Electronically Signed: Lucas Blackwood MD  1/22/2025 6:14 AM EST  Workstation ID: [id number]       Differential Diagnosis and Discussion:    Abdominal Pain: Based on the patient's signs and symptoms, I considered abdominal aortic aneurysm, small bowel obstruction, pancreatitis, acute cholecystitis, acute appendecitis, peptic ulcer disease, gastritis, colitis, endocrine disorders, irritable bowel syndrome and other differential diagnosis an etiology of the patient's " abdominal pain.  Chest Pain:  Based on the patient's signs and symptoms, I considered aortic dissection, myocardial infaction, pulmonary embolism, cardiac tamponade, pericarditis, pneumothorax, musculoskeletal chest pain and other differential diagnosis as an etiology of the patient's chest pain.   Extremity Pain: Differential diagnosis includes but is not limited to soft tissue sprain, tendonitis, tendon injury, dislocation, fracture, deep vein thrombosis, arterial insufficiency, osteoarthritis, bursitis, and ligamentous damage.    PROCEDURES:    Labs were collected in the emergency department and all labs were reviewed and interpreted by me.  X-ray were performed in the emergency department and all X-ray impressions were independently interpreted by me.  An EKG was performed and the EKG was interpreted by me.    ECG 12 Lead ED Triage Standing Order; Chest Pain   Preliminary Result   HEART RATE=50  bpm   RR Gvtfiiyo=0721  ms   WA Jicnbgte=092  ms   P Horizontal Axis=38  deg   P Front Axis=11  deg   QRSD Interval=99  ms   QT Ajteejui=422  ms   QAvC=432  ms   QRS Axis=-25  deg   T Wave Axis=12  deg   - BORDERLINE ECG -   Sinus rhythm   Borderline left axis deviation   Abnormal R-wave progression, late transition   Borderline T wave abnormalities   When compared with ECG of 26-Jul-2016 12:45:23,   No significant change   Date and Time of Study:2025-01-22 06:05:54          Procedures    MDM                     Patient Care Considerations:    CT CHEST: I considered ordering a CT scan of the chest, however patient has a negative D-dimer.  She is not tachycardic and her pain is not pleuritic.      Consultants/Shared Management Plan:    None    Social Determinants of Health:    Patient is independent, reliable, and has access to care.       Disposition and Care Coordination:    Discharged: I considered escalation of care by admitting this patient to the hospital, however EKG is nonischemic and troponin is negative x  2.    I have explained the patient´s condition, diagnoses and treatment plan based on the information available to me at this time. I have answered questions and addressed any concerns. The patient has a good  understanding of the patient´s diagnosis, condition, and treatment plan as can be expected at this point. The vital signs have been stable. The patient´s condition is stable and appropriate for discharge from the emergency department.      The patient will pursue further outpatient evaluation with the primary care physician or other designated or consulting physician as outlined in the discharge instructions. They are agreeable to this plan of care and follow-up instructions have been explained in detail. The patient has received these instructions in written format and has expressed an understanding of the discharge instructions. The patient is aware that any significant change in condition or worsening of symptoms should prompt an immediate return to this or the closest emergency department or call to 911.    Final diagnoses:   Nonspecific chest pain   Type 2 diabetes mellitus with hyperglycemia, with long-term current use of insulin        ED Disposition       ED Disposition   Discharge    Condition   Stable    Comment   --               This medical record created using voice recognition software.             Juan Pablo Sifuentes MD  01/22/25 2667

## 2025-01-23 DIAGNOSIS — E11.65 TYPE 2 DIABETES MELLITUS WITH HYPERGLYCEMIA, WITHOUT LONG-TERM CURRENT USE OF INSULIN: Primary | ICD-10-CM

## 2025-01-23 RX ORDER — HYDROCHLOROTHIAZIDE 12.5 MG/1
CAPSULE ORAL
Qty: 2 EACH | Refills: 11 | Status: SHIPPED | OUTPATIENT
Start: 2025-01-23

## 2025-01-23 NOTE — TELEPHONE ENCOUNTER
The pharmacy is supposed to send a fax asking for my Freestyle Los 3 prescription to be changed to Freestyle Los 3 Plus because the other is being discontinued.

## 2025-02-03 RX ORDER — LETROZOLE 2.5 MG/1
2.5 TABLET, FILM COATED ORAL DAILY
Qty: 30 TABLET | Refills: 2 | Status: SHIPPED | OUTPATIENT
Start: 2025-02-03

## 2025-02-16 ENCOUNTER — PATIENT MESSAGE (OUTPATIENT)
Dept: FAMILY MEDICINE CLINIC | Facility: CLINIC | Age: 55
End: 2025-02-16
Payer: COMMERCIAL

## 2025-02-16 DIAGNOSIS — E11.65 TYPE 2 DIABETES MELLITUS WITH HYPERGLYCEMIA, WITHOUT LONG-TERM CURRENT USE OF INSULIN: Primary | ICD-10-CM

## 2025-02-17 DIAGNOSIS — I10 HYPERTENSION, UNSPECIFIED TYPE: ICD-10-CM

## 2025-02-17 RX ORDER — METOPROLOL SUCCINATE 25 MG/1
50 TABLET, EXTENDED RELEASE ORAL DAILY
Qty: 180 TABLET | Refills: 1 | Status: SHIPPED | OUTPATIENT
Start: 2025-02-17

## 2025-03-07 ENCOUNTER — OFFICE VISIT (OUTPATIENT)
Dept: RADIATION ONCOLOGY | Facility: HOSPITAL | Age: 55
End: 2025-03-07
Payer: COMMERCIAL

## 2025-03-07 VITALS
TEMPERATURE: 97.8 F | RESPIRATION RATE: 18 BRPM | WEIGHT: 148.15 LBS | SYSTOLIC BLOOD PRESSURE: 110 MMHG | HEART RATE: 76 BPM | DIASTOLIC BLOOD PRESSURE: 79 MMHG | OXYGEN SATURATION: 97 % | BODY MASS INDEX: 27.1 KG/M2

## 2025-03-07 DIAGNOSIS — D05.12 DUCTAL CARCINOMA IN SITU (DCIS) OF LEFT BREAST: Primary | ICD-10-CM

## 2025-03-07 PROCEDURE — G0463 HOSPITAL OUTPT CLINIC VISIT: HCPCS | Performed by: RADIOLOGY

## 2025-03-07 NOTE — PROGRESS NOTES
Follow Up Office Visit      Encounter Date: 03/07/2025   Patient Name: Laila Bell  YOB: 1970   Medical Record Number: [id number]   Primary Diagnosis: Ductal carcinoma in situ (DCIS) of left breast [D05.12]   Cancer Staging: Cancer Staging   Ductal carcinoma in situ (DCIS) of left breast  Staging form: Breast, AJCC 8th Edition  - Pathologic: Stage Unknown (pTis (DCIS), pNX, cM0, ER+, [place]+) - Signed by Jed Lowe MD on 9/30/2024    Completion Date:  10/31/2024    Chief Complaint:    Chief Complaint   Patient presents with    Follow-up    Breast Cancer       History of Present Illness: Laila Bell returns to radiation oncology given recent changes of left nipple flattening as well as persistence of left chest wall tenderness.    Subjective      Review of Systems: Review of Systems   Constitutional:  Positive for fatigue (increased over the last few weeks). Negative for appetite change.   Respiratory:  Negative for cough and shortness of breath.    Gastrointestinal:  Negative for constipation, diarrhea and nausea.   Endocrine: Negative for heat intolerance.   Genitourinary:  Negative for difficulty urinating, dysuria, frequency, hematuria and urgency.   Musculoskeletal:  Negative for arthralgias and back pain.   Skin:  Negative for color change and rash.        Left breast tenderness worsening since XRT, nipple flat new this week      Neurological:  Positive for headaches (hx of migraines). Negative for dizziness and weakness.   Psychiatric/Behavioral:  Negative for sleep disturbance.        The following portions of the patient's history were reviewed and updated as appropriate: allergies, current medications, past family history, past medical history, past social history, past surgical history and problem list.    Medications:     Current Outpatient Medications:     atorvastatin (LIPITOR) 10 MG tablet, TAKE 1 TABLET BY MOUTH EVERY NIGHT AT BEDTIME, Disp: 90 tablet, Rfl:  1    Calcium Carb-Cholecalciferol (Calcium 600+D) 600-20 MG-MCG tablet, Take 1 tablet by mouth Daily., Disp: 90 tablet, Rfl: 1    Continuous Glucose Sensor (FreeStyle Los 3 Plus Sensor), Use Every 14 (Fourteen) Days., Disp: 2 each, Rfl: 11    Emgality 120 MG/ML auto-injector pen, Inject 1 mL under the skin into the appropriate area as directed 1 (One) Time., Disp: , Rfl:     Fezolinetant (Veozah) 45 MG tablet, Take 1 tablet by mouth Daily., Disp: 30 tablet, Rfl: 3    lansoprazole (PREVACID) 30 MG capsule, TAKE 1 CAPSULE BY MOUTH DAILY, Disp: 90 capsule, Rfl: 5    metoprolol succinate XL (TOPROL-XL) 25 MG 24 hr tablet, TAKE 2 TABLETS BY MOUTH DAILY, Disp: 180 tablet, Rfl: 1    Semaglutide, 1 MG/DOSE, (OZEMPIC) 4 MG/3ML solution pen-injector, Inject 1 mg under the skin into the appropriate area as directed 1 (One) Time Per Week., Disp: 3 mL, Rfl: 0    traZODone (DESYREL) 100 MG tablet, TAKE 1 TABLET BY MOUTH EVERY NIGHT, Disp: 90 tablet, Rfl: 1    amoxicillin-clavulanate (AUGMENTIN) 875-125 MG per tablet, Take 1 tablet by mouth 2 (Two) Times a Day. (Patient not taking: Reported on 3/7/2025), Disp: 20 tablet, Rfl: 0    letrozole (FEMARA) 2.5 MG tablet, Take 1 tablet by mouth Daily. (Patient not taking: Reported on 3/7/2025), Disp: 30 tablet, Rfl: 2    ondansetron (ZOFRAN) 8 MG tablet, Take 1 tablet by mouth Every 8 (Eight) Hours As Needed for Nausea or Vomiting. (Patient not taking: Reported on 3/7/2025), Disp: 30 tablet, Rfl: 1    SUMAtriptan (Imitrex) 100 MG tablet, Take 1 tablet by mouth As Needed for Migraine. (Patient not taking: Reported on 3/7/2025), Disp: 12 tablet, Rfl: 11    Allergies:   Allergies   Allergen Reactions    Dulaglutide Itching       ECOG: (0) Fully active, able to carry on all predisease performance without restriction  Quality of Life: 100 - Full Activity     Objective     Physical Exam:   Vital Signs:   Vitals:    03/07/25 1526   BP: 110/79   Pulse: 76   Resp: 18   Temp: 97.8 °F (36.6 °C)    TempSrc: Temporal   SpO2: 97%   Weight: 67.2 kg (148 lb 2.4 oz)   PainSc: 3    PainLoc: Breast     Body mass index is 27.1 kg/m².     Physical Exam  Constitutional:       General: She is not in acute distress.     Appearance: She is normal weight. She is not toxic-appearing.   HENT:      Head: Normocephalic and atraumatic.   Pulmonary:      Effort: Pulmonary effort is normal. No respiratory distress.   Chest:      Comments: Faint left breast hyperpigmentation when compared to right breast.  Flattening of nipple areolar complex on the left compared to right.  Dense fibrosis deep to lumpectomy incision; no palpable masses consistent with malignancy  Skin:     General: Skin is warm and dry.   Neurological:      General: No focal deficit present.      Mental Status: She is alert and oriented to person, place, and time.      Cranial Nerves: No cranial nerve deficit.      Gait: Gait normal.   Psychiatric:         Mood and Affect: Mood normal.         Behavior: Behavior normal.         Judgment: Judgment normal.       Laila Bell reports a pain score of 3.  Given her pain assessment as noted, treatment options were discussed and the following options were decided upon as a follow-up plan to address the patient's pain: continuation of current treatment plan for pain.     Radiographs: XR Chest 1 View  Result Date: 1/22/2025  No active disease. Electronically Signed: Lucas Blackwood MD  1/22/2025 6:14 AM EST  Workstation ID: [id number]       Assessment / Plan      Assessment/Plan:   Laila Bell is a 54-year-old female with DCIS of the left breast status post lumpectomy.  This was followed by adjuvant radiotherapy completed on 10/31/2024.  ECOG 0     I discussed the findings of my physical exam today with Ms. Bell.  I explained that I favor the findings to be most consistent with evolution of post radiotherapy skin and soft tissue changes.  I recommended continuing surveillance as scheduled with routine clinical  evaluation and mammograms.  Reassurance regarding exceedingly low likelihood of malignancy was provided.  She was encouraged to contact me regarding any additional concerns or changes.      Vadim Perdue MD  Radiation Oncology  [facility]    This document has been signed by Vadim Perdue MD on March 10, 2025 10:46 EDT

## 2025-03-11 ENCOUNTER — LAB (OUTPATIENT)
Dept: ONCOLOGY | Facility: HOSPITAL | Age: 55
End: 2025-03-11
Payer: COMMERCIAL

## 2025-03-11 ENCOUNTER — OFFICE VISIT (OUTPATIENT)
Dept: ONCOLOGY | Facility: HOSPITAL | Age: 55
End: 2025-03-11
Payer: COMMERCIAL

## 2025-03-11 VITALS
HEART RATE: 75 BPM | BODY MASS INDEX: 27.26 KG/M2 | WEIGHT: 148.15 LBS | SYSTOLIC BLOOD PRESSURE: 103 MMHG | RESPIRATION RATE: 18 BRPM | DIASTOLIC BLOOD PRESSURE: 79 MMHG | OXYGEN SATURATION: 100 % | TEMPERATURE: 98.2 F | HEIGHT: 62 IN

## 2025-03-11 DIAGNOSIS — Z79.899 HIGH RISK MEDICATION USE: ICD-10-CM

## 2025-03-11 DIAGNOSIS — D05.12 DUCTAL CARCINOMA IN SITU (DCIS) OF LEFT BREAST: ICD-10-CM

## 2025-03-11 DIAGNOSIS — E11.65 TYPE 2 DIABETES MELLITUS WITH HYPERGLYCEMIA, WITHOUT LONG-TERM CURRENT USE OF INSULIN: ICD-10-CM

## 2025-03-11 DIAGNOSIS — T45.1X5A HOT FLASHES RELATED TO AROMATASE INHIBITOR THERAPY: ICD-10-CM

## 2025-03-11 DIAGNOSIS — R23.2 HOT FLASHES RELATED TO AROMATASE INHIBITOR THERAPY: ICD-10-CM

## 2025-03-11 DIAGNOSIS — Z79.899 HIGH RISK MEDICATION USE: Primary | ICD-10-CM

## 2025-03-11 DIAGNOSIS — E78.2 MIXED HYPERLIPIDEMIA: ICD-10-CM

## 2025-03-11 LAB
ALBUMIN SERPL-MCNC: 4.3 G/DL (ref 3.5–5.2)
ALBUMIN/GLOB SERPL: 1.8 G/DL
ALP SERPL-CCNC: 124 U/L (ref 39–117)
ALT SERPL W P-5'-P-CCNC: 13 U/L (ref 1–33)
ANION GAP SERPL CALCULATED.3IONS-SCNC: 8.7 MMOL/L (ref 5–15)
AST SERPL-CCNC: 16 U/L (ref 1–32)
BILIRUB SERPL-MCNC: 0.8 MG/DL (ref 0–1.2)
BUN SERPL-MCNC: 8 MG/DL (ref 6–20)
BUN/CREAT SERPL: 11.9 (ref 7–25)
CALCIUM SPEC-SCNC: 9.6 MG/DL (ref 8.6–10.5)
CHLORIDE SERPL-SCNC: 101 MMOL/L (ref 98–107)
CO2 SERPL-SCNC: 28.3 MMOL/L (ref 22–29)
CREAT SERPL-MCNC: 0.67 MG/DL (ref 0.57–1)
EGFRCR SERPLBLD CKD-EPI 2021: 104.7 ML/MIN/1.73
GLOBULIN UR ELPH-MCNC: 2.4 GM/DL
GLUCOSE SERPL-MCNC: 120 MG/DL (ref 65–99)
POTASSIUM SERPL-SCNC: 3.9 MMOL/L (ref 3.5–5.2)
PROT SERPL-MCNC: 6.7 G/DL (ref 6–8.5)
SODIUM SERPL-SCNC: 138 MMOL/L (ref 136–145)

## 2025-03-11 PROCEDURE — 36415 COLL VENOUS BLD VENIPUNCTURE: CPT

## 2025-03-11 PROCEDURE — 80053 COMPREHEN METABOLIC PANEL: CPT

## 2025-03-11 PROCEDURE — G0463 HOSPITAL OUTPT CLINIC VISIT: HCPCS | Performed by: INTERNAL MEDICINE

## 2025-03-11 RX ORDER — EXEMESTANE 25 MG/1
25 TABLET ORAL DAILY
Qty: 30 TABLET | Refills: 1 | Status: SHIPPED | OUTPATIENT
Start: 2025-03-11

## 2025-03-11 RX ORDER — ATORVASTATIN CALCIUM 10 MG/1
10 TABLET, FILM COATED ORAL
Qty: 90 TABLET | Refills: 1 | Status: SHIPPED | OUTPATIENT
Start: 2025-03-11

## 2025-03-11 NOTE — PROGRESS NOTES
Chief Complaint  FOLLOW UP 2     Mychal Calvillo APRN Payne, Chandra, APRN Subjective Traci Gardenia Bell presents to [facility] for DCIS    Patient is a very pleasant 53-year-old female with past medical history of allergic rhinitis, gastroesophageal reflux disease, hypertension, hyperlipidemia, irritable bowel syndrome who presents for oncology follow up regarding DCIS of the left breast.  She underwent MRI of the breast for history of dense breast tissue 7/2/2024 revealed a 0.8 cm enhancing mass in the central left breast, 6 cm from the nipple.  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  She underwent lumpectomy on 8/30/2024 revealing low-grade DCIS measuring 6 mm in greatest dimension with negative margins, ER positive/MO positive.      Interval History  Patient is here for follow up.  When she held anastrozole her mood changes improved.  She then started letrozole but this caused increased migraines.  She has been off letrozole for 2 weeks and migraines have resolved.  She is having some physical changes in her breast related to radiation.  She recently saw radiation with reassurance.  She is willing to try exemestane.  Reports that since starting pills that she has not had 1 hot flash.  Will get repeat LFTs today.    Oncology/Hematology History Overview Note   7/2/24: MRI breast (screening due to family history breast cancer)  revealed a 0.8 cm enhancing mass in the central left breast, 6 cm from the nipple.      7/24/24: Pathology from MRI guided core needle biopsy revealed atypical ductal hyperplasia with an intraductal papilloma.      8/30/24: Left breast lumpectomy with pathology revealing low-grade DCIS measuring 6 mm in greatest dimension with negative margins, ER positive/MO positive.      9/30/24: Orders written for anastrozole 1 mg daily.     10/3/24:  Completed adjuvant radiation.        Ductal carcinoma in situ (DCIS) of left breast   9/30/2024 Initial Diagnosis    Ductal carcinoma in situ (DCIS) of left breast     9/30/2024 Cancer Staged    Staging form: Breast, AJCC 8th Edition  - Pathologic: Stage Unknown (pTis (DCIS), pNX, cM0, ER+, [place]+) - Signed by Jed Lowe MD on 9/30/2024     Intraductal carcinoma in situ of left breast   10/3/2024 Initial Diagnosis    Intraductal carcinoma in situ of left breast     10/10/2024 - 10/31/2024 Radiation    Radiation OncologyTreatment Course:  Laila Bell received 4256 cGy in 16 fractions to the left breast.               Review of Systems   Constitutional:  Negative for appetite change, diaphoresis, fatigue, fever, unexpected weight gain and unexpected weight loss.   HENT:  Negative for hearing loss, mouth sores, sore throat, swollen glands, trouble swallowing and voice change.    Eyes:  Negative for blurred vision.   Respiratory:  Negative for cough, shortness of breath and wheezing.    Cardiovascular:  Negative for chest pain and palpitations.   Gastrointestinal:  Negative for abdominal pain, blood in stool, constipation, diarrhea, nausea and vomiting.   Endocrine: Negative for cold intolerance and heat intolerance.   Genitourinary:  Negative for difficulty urinating, dysuria, frequency, hematuria and urinary incontinence.   Musculoskeletal:  Negative for arthralgias, back pain and myalgias.   Skin:  Negative for rash, skin lesions and wound.        Pt has burns on skins from radiation    Neurological:  Negative for dizziness, seizures, weakness, numbness and headache.   Hematological:  Does not bruise/bleed easily.   Psychiatric/Behavioral:  Negative for depressed mood. The patient is not nervous/anxious.    All other systems reviewed and are negative.    Current Outpatient Medications on File Prior to Visit   Medication Sig Dispense Refill    amoxicillin-clavulanate (AUGMENTIN) 875-125 MG per tablet  Take 1 tablet by mouth 2 (Two) Times a Day. (Patient not taking: Reported on 3/7/2025) 20 tablet 0    Calcium Carb-Cholecalciferol (Calcium 600+D) 600-20 MG-MCG tablet Take 1 tablet by mouth Daily. 90 tablet 1    Continuous Glucose Sensor (FreeStyle Los 3 Plus Sensor) Use Every 14 (Fourteen) Days. 2 each 11    Emgality 120 MG/ML auto-injector pen Inject 1 mL under the skin into the appropriate area as directed 1 (One) Time.      Fezolinetant (Veozah) 45 MG tablet Take 1 tablet by mouth Daily. 30 tablet 3    lansoprazole (PREVACID) 30 MG capsule TAKE 1 CAPSULE BY MOUTH DAILY 90 capsule 5    letrozole (FEMARA) 2.5 MG tablet Take 1 tablet by mouth Daily. (Patient not taking: Reported on 3/7/2025) 30 tablet 2    metoprolol succinate XL (TOPROL-XL) 25 MG 24 hr tablet TAKE 2 TABLETS BY MOUTH DAILY 180 tablet 1    ondansetron (ZOFRAN) 8 MG tablet Take 1 tablet by mouth Every 8 (Eight) Hours As Needed for Nausea or Vomiting. (Patient not taking: Reported on 3/7/2025) 30 tablet 1    Semaglutide, 1 MG/DOSE, (OZEMPIC) 4 MG/3ML solution pen-injector Inject 1 mg under the skin into the appropriate area as directed 1 (One) Time Per Week. 3 mL 0    SUMAtriptan (Imitrex) 100 MG tablet Take 1 tablet by mouth As Needed for Migraine. (Patient not taking: Reported on 3/7/2025) 12 tablet 11    traZODone (DESYREL) 100 MG tablet TAKE 1 TABLET BY MOUTH EVERY NIGHT 90 tablet 1    [DISCONTINUED] atorvastatin (LIPITOR) 10 MG tablet TAKE 1 TABLET BY MOUTH EVERY NIGHT AT BEDTIME 90 tablet 1     No current facility-administered medications on file prior to visit.       Allergies   Allergen Reactions    Dulaglutide Itching     Past Medical History:   Diagnosis Date    Abnormal ECG 08/16/2016    Abnormal Pap smear of cervix 2011    Acid reflux     Allergic rhinitis     Breast mass July 2024    Bursitis of left hip 04/06/2018    Cholelithiasis 2002    Colon polyps     Constipation     CTS (carpal tunnel syndrome) 1998    Diabetes mellitus, type 2  05/08/2019    Diarrhea     DM2 (diabetes mellitus, type 2) 05/08/2019    Fatigue     Frequent headaches     Gastroesophageal reflux     GERD (gastroesophageal reflux disease)     Gestational diabetes     Headache, tension-type 1989    History of screening mammography 02/2019 2020 scheduled    HLD (hyperlipidemia)     HPV in female 2013    Hyperlipidemia     Hypertension     Hypotension     IBS (irritable bowel syndrome)     LGSIL on Pap smear of cervix 2012    LGSIL on Pap smear of cervix 2011    Menorrhagia     Migraine     Mild dysplasia of cervix (DOMINICK I)     Mild dysplasia of cervix (DOMINICK I) 2011    Numbness and tingling     in feet    Pain of left hip joint 04/06/2018    Peripheral neuropathy     PONV (postoperative nausea and vomiting)     Screening mammogram, encounter for 02/2019 2020 SCHEDULED     Sinus trouble     Spinal headache     Tendinitis of left hip 04/06/2018    Urinary frequency     Urinary urgency      Past Surgical History:   Procedure Laterality Date    ABDOMINAL HYSTERECTOMY      BRAIN SURGERY  2019    Pituitary tumor    BREAST BIOPSY  July 2024    Left    BREAST BIOPSY Left 8/30/2024    Procedure: Left breast needle localized lumpectomy, RIGHT BACK CYST EXCISION;  Surgeon: Ana Paula Patterson MD;  Location: [facility] OR [facility] [place];  Service: General;  Laterality: Left;    BREAST SURGERY  8/30/24    CARPAL TUNNEL RELEASE  1999    CHOLECYSTECTOMY      COLONOSCOPY  2020 2015 & 2020     COLONOSCOPY  2015, 2020    COLONOSCOPY N/A 06/13/2023    Procedure: COLONOSCOPY WITH COLD SNARE POLYPECTOMIES;  Surgeon: Jose David Berry MD;  Location: [facility] ENDOSCOPY;  Service: Gastroenterology;  Laterality: N/A;  COLON POLYPS    COLPOSCOPY  03/23/2011    D & C HYSTEROSCOPY ENDOMETRIAL ABLATION      ENDOMETRIAL ABLATION  2003    ENDOSCOPY  2020    HYSTERECTOMY  2004    LAPAROSCOPIC TUBAL LIGATION W/ FALOPE RING  2003    MAMMO STEREOTACTIC BREAST BIOPSY 1ST W WO DEVICE Right 2007    TONSILLECTOMY  1990     "TUMOR REMOVAL  2019    transsphenoidal    UPPER GASTROINTESTINAL ENDOSCOPY  2020     Social History     Socioeconomic History    Marital status:    Tobacco Use    Smoking status: Never     Passive exposure: Never    Smokeless tobacco: Never   Vaping Use    Vaping status: Never Used   Substance and Sexual Activity    Alcohol use: Never    Drug use: Never    Sexual activity: Yes     Partners: Male     Birth control/protection: Hysterectomy, Surgical     Family History   Problem Relation Age of Onset    Hypertension Father     Heart disease Father     Diabetes Father     Arthritis Father     Liver disease Father     Cirrhosis Father     Liver cancer Father     Cancer Father         Liver    Breast cancer Mother     Arthritis Mother     Liver disease Mother     Migraines Mother     Neuropathy Mother     Cancer Mother         Inflammatory Breast Cancer    Kidney disease Paternal Grandfather     Heart disease Paternal Grandfather     Colon cancer Paternal Grandfather 40    Colon polyps Paternal Grandfather     Uterine cancer Maternal Grandmother     Migraines Maternal Grandmother     Migraines Maternal Aunt     Migraines Paternal Aunt     Liver cancer Other     Lung cancer Other     Ovarian cancer Neg Hx     Melanoma Neg Hx     Prostate cancer Neg Hx     Deep vein thrombosis Neg Hx        Objective   Physical Exam  Well appearing patient in no acute distress on RA  Anicteric sclerae, no rash on exposed skin  Respirations non-labored  Awake, alert, and oriented x 4. Speech intact. No gross neurologic deficit  Appropriate mood and affect    Vitals:    03/11/25 1528   BP: 103/79   Pulse: 75   Resp: 18   Temp: 98.2 °F (36.8 °C)   TempSrc: Temporal   SpO2: 100%   Weight: 67.2 kg (148 lb 2.4 oz)   Height: 157.5 cm (62.01\")   PainSc: 0-No pain                   PHQ-9 Total Score:              Result Review :   The following data was reviewed by: Jed Lowe MD on 03/11/25:  Lab Results   Component Value Date    " HGB 14.5 01/22/2025    HCT 42.7 01/22/2025    MCV 90.7 01/22/2025     01/22/2025    WBC 5.56 01/22/2025    NEUTROABS 3.99 01/22/2025    LYMPHSABS 0.88 01/22/2025    MONOSABS 0.34 01/22/2025    EOSABS 0.31 01/22/2025    BASOSABS 0.03 01/22/2025     Lab Results   Component Value Date    GLUCOSE 249 (H) 01/22/2025    BUN 12 01/22/2025    CREATININE 0.82 01/22/2025     01/22/2025    K 4.0 01/22/2025     01/22/2025    CO2 25.5 01/22/2025    CALCIUM 9.4 01/22/2025    PROTEINTOT 6.8 01/22/2025    ALBUMIN 4.5 01/22/2025    BILITOT 0.9 01/22/2025    ALKPHOS 120 (H) 01/22/2025    AST 19 01/22/2025    ALT 15 01/22/2025     Lab Results   Component Value Date    MG 2.0 01/22/2025    FREET4 1.15 06/20/2023    TSH 1.470 06/20/2023     Baseline LFTs normal.       No radiology results for the last 30 days.        Assessment and Plan    Diagnoses and all orders for this visit:    1. High risk medication use (Primary)  -     Comprehensive Metabolic Panel; Future  -     Comprehensive Metabolic Panel; Future    2. Hot flashes related to aromatase inhibitor therapy  -     Comprehensive Metabolic Panel; Future  -     Comprehensive Metabolic Panel; Future    3. Ductal carcinoma in situ (DCIS) of left breast  -     Comprehensive Metabolic Panel; Future  -     Comprehensive Metabolic Panel; Future    Other orders  -     exemestane (AROMASIN) 25 MG tablet; Take 1 tablet by mouth Daily.  Dispense: 30 tablet; Refill: 1        Left breast DCIS  Lumpectomy 8/30/24 with low grade DCIS. ER/MD positive. Completed adjuvant radiation.  Due to hormone receptor positivity remaining breast tissue recommend adjuvant hormonal therapy.  9/30/24 FSH and estradiol confirmed post-menopausal status. Started adjuvant aromatase inhibitor with anastrozole 1 mg daily at that time. Side effects of nausea, headache, insomnia, hot flashes. 1/14/25: Reports these have improved but she has had more mood instability. Anastrozole held with improvement  in mood. Letrozole started, took x 4 weeks but caused increase in migraines. Will hold and provide exemestane - start 3/11/25. Can also try tamoxifen if exemestane is not tolerated.      Recommend vitamin D and calcium. 10/2024 baseline DEXA scan normal, repeat q2 years.    RTC 4 weeks     Hot flashes  Veozah daily started. Check LFTs today and again in 4 weeks.       I spent 25 minutes caring for Laila on this date of service. This time includes time spent by me in the following activities:preparing for the visit, reviewing tests, obtaining and/or reviewing a separately obtained history, performing a medically appropriate examination and/or evaluation , counseling and educating the patient/family/caregiver, ordering medications, tests, or procedures, referring and communicating with other health care professionals , documenting information in the medical record, independently interpreting results and communicating that information with the patient/family/caregiver, and care coordination    Patient Follow Up: 4 weeks  Patient was given instructions and counseling regarding her condition or for health maintenance advice. Please see specific information pulled into the AVS if appropriate.

## 2025-03-14 DIAGNOSIS — D05.12 DUCTAL CARCINOMA IN SITU (DCIS) OF LEFT BREAST: Primary | ICD-10-CM

## 2025-03-14 DIAGNOSIS — I89.0 LYMPHEDEMA: ICD-10-CM

## 2025-03-16 DIAGNOSIS — E11.65 TYPE 2 DIABETES MELLITUS WITH HYPERGLYCEMIA, WITHOUT LONG-TERM CURRENT USE OF INSULIN: ICD-10-CM

## 2025-03-17 RX ORDER — SEMAGLUTIDE 1.34 MG/ML
INJECTION, SOLUTION SUBCUTANEOUS
Qty: 3 ML | Refills: 0 | Status: SHIPPED | OUTPATIENT
Start: 2025-03-17

## 2025-03-25 ENCOUNTER — HOSPITAL ENCOUNTER (OUTPATIENT)
Facility: HOSPITAL | Age: 55
Setting detail: THERAPIES SERIES
Discharge: HOME | End: 2025-03-25
Payer: COMMERCIAL

## 2025-03-25 DIAGNOSIS — M25.60 JOINT STIFFNESS: ICD-10-CM

## 2025-03-25 DIAGNOSIS — L90.5 SCAR CONDITION AND FIBROSIS OF SKIN: ICD-10-CM

## 2025-03-25 DIAGNOSIS — C50.412 MALIGNANT NEOPLASM OF UPPER-OUTER QUADRANT OF LEFT BREAST IN FEMALE, ESTROGEN RECEPTOR POSITIVE: ICD-10-CM

## 2025-03-25 DIAGNOSIS — Z91.89 AT RISK FOR LYMPHEDEMA: Primary | ICD-10-CM

## 2025-03-25 DIAGNOSIS — Z17.0 MALIGNANT NEOPLASM OF UPPER-OUTER QUADRANT OF LEFT BREAST IN FEMALE, ESTROGEN RECEPTOR POSITIVE: ICD-10-CM

## 2025-03-25 DIAGNOSIS — R52 PAIN: ICD-10-CM

## 2025-03-25 PROCEDURE — 97165 OT EVAL LOW COMPLEX 30 MIN: CPT | Performed by: OCCUPATIONAL THERAPIST

## 2025-03-25 NOTE — THERAPY EVALUATION
Outpatient Occupational Therapy Lymphedema Initial Evaluation  KAELYN Layne     Patient Name: Laila Bell  : 1970  MRN: [id number]  Today's Date: 3/25/2025      Visit Date: 2025    Patient Active Problem List   Diagnosis    Migraine    Abnormal ECG    Allergic rhinitis    Colon polyps    DM2 (diabetes mellitus, type 2)    Esophageal reflux    Hyperlipidemia    Hypertension    IBS (irritable bowel syndrome)    Pain of left hip joint    Pituitary adenoma    Pituitary tumor    Polyuria    Prolactin increased    S/P selective transsphenoidal pituitary adenomectomy    Tendinitis    Post-COVID-19 syndrome manifesting as chronic headache    Colon cancer screening    Family history of colon cancer    Atypical ductal hyperplasia of breast    Ductal carcinoma in situ (DCIS) of left breast    Intraductal carcinoma in situ of left breast        Past Medical History:   Diagnosis Date    Abnormal ECG 2016    Abnormal Pap smear of cervix     Acid reflux     Allergic rhinitis     Breast mass 2024    Bursitis of left hip 2018    Cholelithiasis 2002    Colon polyps     Constipation     CTS (carpal tunnel syndrome) 1998    Diabetes mellitus, type 2 2019    Diarrhea     DM2 (diabetes mellitus, type 2) 2019    Fatigue     Frequent headaches     Gastroesophageal reflux     GERD (gastroesophageal reflux disease)     Gestational diabetes     Headache, tension-type 1989    History of screening mammography 2019 scheduled    HLD (hyperlipidemia)     HPV in female     Hyperlipidemia     Hypertension     Hypotension     IBS (irritable bowel syndrome)     LGSIL on Pap smear of cervix     LGSIL on Pap smear of cervix     Menorrhagia     Migraine     Mild dysplasia of cervix (DOMINICK I)     Mild dysplasia of cervix (DOMINICK I)     Numbness and tingling     in feet    Pain of left hip joint 2018    Peripheral neuropathy     PONV (postoperative nausea and vomiting)      Screening mammogram, encounter for 02/2019 2020 SCHEDULED     Sinus trouble     Spinal headache     Tendinitis of left hip 04/06/2018    Urinary frequency     Urinary urgency         Past Surgical History:   Procedure Laterality Date    ABDOMINAL HYSTERECTOMY      BRAIN SURGERY  2019    Pituitary tumor    BREAST BIOPSY  July 2024    Left    BREAST BIOPSY Left 8/30/2024    Procedure: Left breast needle localized lumpectomy, RIGHT BACK CYST EXCISION;  Surgeon: Ana Paula Patterson MD;  [place] [facility] [place] [facility] [place]  Service: General;  Laterality: Left;    BREAST SURGERY  8/30/24    CARPAL TUNNEL RELEASE  1999    CHOLECYSTECTOMY      COLONOSCOPY  2020 2015 & 2020     COLONOSCOPY  2015, 2020    COLONOSCOPY N/A 06/13/2023    Procedure: COLONOSCOPY WITH COLD SNARE POLYPECTOMIES;  Surgeon: Jose David Berry MD;  Location: [facility] ENDOSCOPY;  Service: Gastroenterology;  Laterality: N/A;  COLON POLYPS    COLPOSCOPY  03/23/2011    D & C HYSTEROSCOPY ENDOMETRIAL ABLATION      ENDOMETRIAL ABLATION  2003    ENDOSCOPY  2020    HYSTERECTOMY  2004    LAPAROSCOPIC TUBAL LIGATION W/ FALOPE RING  2003    MAMMO STEREOTACTIC BREAST BIOPSY 1ST W WO DEVICE Right 2007    TONSILLECTOMY  1990    TUMOR REMOVAL  2019    transsphenoidal    UPPER GASTROINTESTINAL ENDOSCOPY  2020         Visit Dx:     ICD-10-CM ICD-9-CM   1. At risk for lymphedema  Z91.89 V49.89   2. Scar condition and fibrosis of skin  L90.5 709.2   3. Joint stiffness  M25.60 719.50   4. Pain  R52 780.96   5. Malignant neoplasm of upper-outer quadrant of left breast in female, estrogen receptor positive  C50.412 174.4    Z17.0 V86.0        Patient History       Row Name 03/25/25 1400             History    Chief Complaint --  Breast care  -TD      Brief Description of Current Complaint Laila is a 54 year old female that under went a left sided lumpectomy in 8/2024.  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  -TD         Fall Risk  "Assessment    Any falls in the past year: No  -TD      Does patient have a fear of falling No  -TD         Services    Are you currently receiving Home Health services No  -TD      Do you plan to receive Home Health services in the near future No  -TD         Daily Activities    Primary Language English  -TD      Are you able to read Yes  -TD      Are you able to write Yes  -TD      How does patient learn best? Listening;Reading;Demonstration;Pictures/Video  -TD         Safety    Are you being hurt, hit, or frightened by anyone at home or in your life? No  -TD      Are you being neglected by a caregiver No  -TD      Have you had any of the following issues with N/A  -TD                User Key  (r) = Recorded By, (t) = Taken By, (c) = Cosigned By      Initials Name Provider Type    TD Maria Del Carmen Howard OT Occupational Therapist                     Lymphedema       Row Name 03/25/25 1400             Subjective Pain    Able to rate subjective pain? yes  -TD      Pre-Treatment Pain Level 6  -TD      Post-Treatment Pain Level 6  -TD         Lymphedema Assessment    Lymphedema Classification LUE:;at risk/stage 0  -TD      Lymphedema Cancer Related Sx left;lumpectomy  -TD      Lymphedema Surgery Comments 8/2024  -TD      Radiation Therapy Received yes  -TD      Radiation Treatments #/Timeframe 16  -TD         LLIS - Physical Concerns    The amount of pain associated with my lymphedema is: 4  -TD      The amount of limb heaviness associated with my lymphedema is: 0  -TD      The amount of skin tightness associated with my lymphedema is: 1  -TD      The size of my swollen limb(s) seems: 0  -TD      Lymphedema affects the movement of my swollen limb(s): 4  -TD      The strength in my swollen limb(s) is: 0  -TD         LLIS - Psychosocial Concerns    Lymphedema affects my body image (i.e., \"how I think I look\"). 0  -TD      Lymphedema affects my socializing with others. 0  -TD      Lymphedema affects my intimate relations with " "spouse or partner (rate 0 if not applicable 0  -TD      Lymphedema \"gets me down\" (i.e., depression, frustration, or anger) 0  -TD      I must rely on others for help due to my lymphedema. 0  -TD      I know what to do to manage my lymphedema 4  -TD         LLIS - Functional Concerns    Lymphedema affects my ability to perform self-care activities (i.e. eating, dressing, hygiene) 0  -TD      Lymphedema affects my ability to perform routine home or work-related activities. 0  -TD      Lymphedema affects my performance of preferred leisure activities. 0  -TD      Lymphedema affects proper fit of clothing/shoes 0  -TD      Lymphedema affects my sleep 0  -TD         Posture/Observations    Posture- WNL Posture is WNL  -TD         General ROM    GENERAL ROM COMMENTS BUE are WFL with tightness at end ranges  -TD         MMT (Manual Muscle Testing)    General MMT Comments BUE are WFL  -TD         Skin Changes/Observations    Location/Assessment Upper Quadrant  -TD      Upper Quadrant Conditions left:;intact;clean;dry  -TD      Upper Quadrant Color/Pigment left:;radiation fibrosis  -TD      Skin Observations Comment Pt had radiation fibrosis throughout the left breast.  -TD         Lymphedema Life Impact Scale Totals    A.  Total Q1 - Q17 (Do not include Q18) 13  -TD      B.  Total number of questions answered (Q1-Q17) 17  -TD      C. Divide A by B 0.76  -TD      D. Multiple C by 25 19  -TD                User Key  (r) = Recorded By, (t) = Taken By, (c) = Cosigned By      Initials Name Provider Type    TD Maria Del Carmen Howard OT Occupational Therapist                            Therapy Education  Education Details: Pt was educated on treatment plan  Given: HEP, Symptoms/condition management, Edema management, Pain management  Program: New  How Provided: Verbal, Demonstration, Written  Provided to: Patient  Level of Understanding: Teach back education performed, Verbalized, Demonstrated         OT Goals       Row Name 03/25/25 " 1505          Time Calculation    OT Goal Re-Cert Due Date 04/24/25  -TD               User Key  (r) = Recorded By, (t) = Taken By, (c) = Cosigned By      Initials Name Provider Type    Maria Del Carmen Sotelo OT Occupational Therapist                  LTG [2]:  90 Days: The patient will report a pain rating of 0/10 or better to improve functional activities .    STATUS:  New  STG [2]a:  The patient will report a pain rating of 2/10 or better.  STATUS:  New  TREATMENT:  Manual Therapy, Therapeutic Activity, ADL retraining, Neuromuscular Re-education, Therapeutic Exercise, Patient and Family Education, Orthotic Management and training, Modalities: TENS, NMES, Ultrasound, Fluidotherapy Wound dressing as needed, Modalities as needed and Manual Therapy.    Self-Care Limitations     LTG 3: 90 days:  The patient will demonstrate improved quality of life by achieving a score of 14 on the Lymphedema Life Impact Scale.           STATUS:  New   STG 3a: 30 days:  The patient will demonstrate improved quality of life by achieving a score of 15 on the Lymphedema Life Impact Scale.           STATUS:  New  TREATMENT:  Manual therapy, therapeutic exercise, therapeutic activity, ADL retraining, Patient/caregiver education, Modalities: TENS, NMES, Ultrasound, Fluidotherapy, Orthotic Management and Training, wound dressing as needed.             OT Assessment/Plan       Row Name 03/25/25 1506          OT Assessment    Functional Limitations Limitations in functional capacity and performance  -TD     Impairments Impaired lymphatic circulation;Joint mobility;Pain;Range of motion  -TD     OT Diagnosis Laila is a 54 year old female that under went a left sided lumpectomy in 8/2024. 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  Pt would benefit from continued skilled OT to prevent increased staging of lympehdema, increased pain, and decrease AROM.  -TD     OT Rehab Potential Good  -TD      Patient/caregiver participated in establishment of treatment plan and goals Yes  -TD     Patient would benefit from skilled therapy intervention Yes  -TD        OT Plan    OT Frequency 1x/week;2x/week  -TD     Predicted Duration of Therapy Intervention (OT) 8 weeks  -TD     Planned Therapy Interventions (Optional Details) home exercise program;manual therapy techniques;stretching;strengthening;ROM (Range of Motion);prosthetic fitting/training;patient/family education;orthotic fitting/training  -TD     OT Plan Comments Intiate POC  -TD               User Key  (r) = Recorded By, (t) = Taken By, (c) = Cosigned By      Initials Name Provider Type    TD Maria Del Carmen Howard OT Occupational Therapist                              Time Calculation:   Untimed Charges  OT Eval/Re-eval Minutes: 55  Total Minutes  Untimed Charges Total Minutes: 55   Total Minutes: 55     Therapy Charges for Today       Code Description Service Date Service Provider Modifiers Qty    81096251643  OT EVAL LOW COMPLEXITY 4 3/25/2025 Maria Del Carmen Howard OT GO 1                      Maria Del Carmen Howard OT  3/25/2025

## 2025-03-27 ENCOUNTER — HOSPITAL ENCOUNTER (OUTPATIENT)
Facility: HOSPITAL | Age: 55
Setting detail: THERAPIES SERIES
Discharge: HOME | End: 2025-03-27
Payer: COMMERCIAL

## 2025-03-27 DIAGNOSIS — L90.5 SCAR CONDITION AND FIBROSIS OF SKIN: ICD-10-CM

## 2025-03-27 DIAGNOSIS — M25.60 JOINT STIFFNESS: ICD-10-CM

## 2025-03-27 DIAGNOSIS — Z17.0 MALIGNANT NEOPLASM OF UPPER-OUTER QUADRANT OF LEFT BREAST IN FEMALE, ESTROGEN RECEPTOR POSITIVE: ICD-10-CM

## 2025-03-27 DIAGNOSIS — Z91.89 AT RISK FOR LYMPHEDEMA: Primary | ICD-10-CM

## 2025-03-27 DIAGNOSIS — R52 PAIN: ICD-10-CM

## 2025-03-27 DIAGNOSIS — C50.412 MALIGNANT NEOPLASM OF UPPER-OUTER QUADRANT OF LEFT BREAST IN FEMALE, ESTROGEN RECEPTOR POSITIVE: ICD-10-CM

## 2025-03-27 PROCEDURE — 97140 MANUAL THERAPY 1/> REGIONS: CPT | Performed by: OCCUPATIONAL THERAPIST

## 2025-03-27 NOTE — THERAPY TREATMENT NOTE
Outpatient Occupational Therapy Lymphedema Treatment Note  KAELYN Layne     Patient Name: Laila Bell  : 1970  MRN: [id number]  Today's Date: 3/27/2025      Visit Date: 2025    Patient Active Problem List   Diagnosis    Migraine    Abnormal ECG    Allergic rhinitis    Colon polyps    DM2 (diabetes mellitus, type 2)    Esophageal reflux    Hyperlipidemia    Hypertension    IBS (irritable bowel syndrome)    Pain of left hip joint    Pituitary adenoma    Pituitary tumor    Polyuria    Prolactin increased    S/P selective transsphenoidal pituitary adenomectomy    Tendinitis    Post-COVID-19 syndrome manifesting as chronic headache    Colon cancer screening    Family history of colon cancer    Atypical ductal hyperplasia of breast    Ductal carcinoma in situ (DCIS) of left breast    Intraductal carcinoma in situ of left breast        Past Medical History:   Diagnosis Date    Abnormal ECG 2016    Abnormal Pap smear of cervix     Acid reflux     Allergic rhinitis     Breast mass 2024    Bursitis of left hip 2018    Cholelithiasis 2002    Colon polyps     Constipation     CTS (carpal tunnel syndrome) 1998    Diabetes mellitus, type 2 2019    Diarrhea     DM2 (diabetes mellitus, type 2) 2019    Fatigue     Frequent headaches     Gastroesophageal reflux     GERD (gastroesophageal reflux disease)     Gestational diabetes     Headache, tension-type 1989    History of screening mammography 2019 scheduled    HLD (hyperlipidemia)     HPV in female     Hyperlipidemia     Hypertension     Hypotension     IBS (irritable bowel syndrome)     LGSIL on Pap smear of cervix     LGSIL on Pap smear of cervix     Menorrhagia     Migraine     Mild dysplasia of cervix (DOMINICK I)     Mild dysplasia of cervix (DOMINICK I)     Numbness and tingling     in feet    Pain of left hip joint 2018    Peripheral neuropathy     PONV (postoperative nausea and vomiting)     Screening  mammogram, encounter for 02/2019 2020 SCHEDULED     Sinus trouble     Spinal headache     Tendinitis of left hip 04/06/2018    Urinary frequency     Urinary urgency         Past Surgical History:   Procedure Laterality Date    ABDOMINAL HYSTERECTOMY      BRAIN SURGERY  2019    Pituitary tumor    BREAST BIOPSY  July 2024    Left    BREAST BIOPSY Left 8/30/2024    Procedure: Left breast needle localized lumpectomy, RIGHT BACK CYST EXCISION;  Surgeon: Ana Paula Patterson MD;  Location: [facility] OR [facility] [place];  Service: General;  Laterality: Left;    BREAST SURGERY  8/30/24    CARPAL TUNNEL RELEASE  1999    CHOLECYSTECTOMY      COLONOSCOPY  2020 2015 & 2020     COLONOSCOPY  2015, 2020    COLONOSCOPY N/A 06/13/2023    Procedure: COLONOSCOPY WITH COLD SNARE POLYPECTOMIES;  Surgeon: Jose David Berry MD;  Location: [facility] ENDOSCOPY;  Service: Gastroenterology;  Laterality: N/A;  COLON POLYPS    COLPOSCOPY  03/23/2011    D & C HYSTEROSCOPY ENDOMETRIAL ABLATION      ENDOMETRIAL ABLATION  2003    ENDOSCOPY  2020    HYSTERECTOMY  2004    LAPAROSCOPIC TUBAL LIGATION W/ FALOPE RING  2003    MAMMO STEREOTACTIC BREAST BIOPSY 1ST W WO DEVICE Right 2007    TONSILLECTOMY  1990    TUMOR REMOVAL  2019    transsphenoidal    UPPER GASTROINTESTINAL ENDOSCOPY  2020         Visit Dx:      ICD-10-CM ICD-9-CM   1. At risk for lymphedema  Z91.89 V49.89   2. Scar condition and fibrosis of skin  L90.5 709.2   3. Joint stiffness  M25.60 719.50   4. Pain  R52 780.96   5. Malignant neoplasm of upper-outer quadrant of left breast in female, estrogen receptor positive  C50.412 174.4    Z17.0 V86.0        Lymphedema       Row Name 03/27/25 1200             Subjective Pain    Able to rate subjective pain? yes  -TD      Pre-Treatment Pain Level 5  -TD      Post-Treatment Pain Level 1  -TD         Subjective    Subjective Comments Pt was ready for surgery  -TD         Lymphedema Assessment    Lymphedema Classification LUE:;at risk/stage 0  -TD       Lymphedema Cancer Related Sx left;lumpectomy  -TD      Radiation Therapy Received yes  -TD      Radiation Treatments #/Timeframe 16  -TD         Manual Lymphatic Drainage    Manual Therapy Therapist completedaxilla PORI protocol to address tightness in the left axilla and trigger point release across the left chest wall.  -TD                User Key  (r) = Recorded By, (t) = Taken By, (c) = Cosigned By      Initials Name Provider Type    TD Maria Del Carmen Howard, OT Occupational Therapist                             OT Assessment/Plan       Row Name 03/27/25 1300          OT Assessment    Functional Limitations Limitations in functional capacity and performance  -TD     Impairments Impaired lymphatic circulation;Joint mobility;Pain;Range of motion  -TD     Assessment Comments Laila is a 54 year old female that under went a left sided lumpectomy in 8/2024. 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  Therapist completed PORI protocol to address tightness in the left axilla and chest wall.  Pt reported relief after treatment.  Pt would benefit from continued skilled OT to prevent increased staging of lympehdema, increased pain, and decrease AROM.  -TD     OT Diagnosis at risk for lymphedema  -TD     OT Rehab Potential Good  -TD     Patient/caregiver participated in establishment of treatment plan and goals Yes  -TD     Patient would benefit from skilled therapy intervention Yes  -TD        OT Plan    OT Plan Comments continue POC  -TD               User Key  (r) = Recorded By, (t) = Taken By, (c) = Cosigned By      Initials Name Provider Type    Maria Del Carmen Sotelo OT Occupational Therapist                        Manual Rx (Last 36 Hours)       Manual Treatments       Row Name 03/27/25 1302             Total Minutes    89335 - OT Manual Therapy Minutes 25  -TD                User Key  (r) = Recorded By, (t) = Taken By, (c) = Cosigned By      Initials Name Provider Type    JEFERSON Howard  LALO Joyce Occupational Therapist                      Therapy Education  Education Details: Reviewed treatment plan and stretches  Given: HEP, Symptoms/condition management, Edema management, Pain management  Program: New  How Provided: Verbal, Demonstration  Provided to: Patient  Level of Understanding: Teach back education performed, Verbalized, Demonstrated                Time Calculation:   Timed Charges  70075 - OT Manual Therapy Minutes: 25  Total Minutes  Timed Charges Total Minutes: 25   Total Minutes: 25     Therapy Charges for Today       Code Description Service Date Service Provider Modifiers Qty    [id number]  OT MANUAL THERAPY EA 15 MIN 3/27/2025 Maria Del Carmen Howard OT GO 2                        Maria Del Carmen Howard OT  3/27/2025

## 2025-04-02 ENCOUNTER — PRIOR AUTHORIZATION (OUTPATIENT)
Dept: NEUROLOGY | Facility: CLINIC | Age: 55
End: 2025-04-02
Payer: COMMERCIAL

## 2025-04-03 NOTE — TELEPHONE ENCOUNTER
PA Case: [id number], Status: Approved, Coverage Starts on: 3/29/2024 12:00:00 AM, Coverage Ends on: 6/28/2025 12:00:00 AM.

## 2025-04-10 ENCOUNTER — HOSPITAL ENCOUNTER (OUTPATIENT)
Facility: HOSPITAL | Age: 55
Setting detail: THERAPIES SERIES
Discharge: HOME | End: 2025-04-10
Payer: COMMERCIAL

## 2025-04-10 DIAGNOSIS — C50.412 MALIGNANT NEOPLASM OF UPPER-OUTER QUADRANT OF LEFT BREAST IN FEMALE, ESTROGEN RECEPTOR POSITIVE: ICD-10-CM

## 2025-04-10 DIAGNOSIS — Z91.89 AT RISK FOR LYMPHEDEMA: Primary | ICD-10-CM

## 2025-04-10 DIAGNOSIS — M25.60 JOINT STIFFNESS: ICD-10-CM

## 2025-04-10 DIAGNOSIS — Z17.0 MALIGNANT NEOPLASM OF UPPER-OUTER QUADRANT OF LEFT BREAST IN FEMALE, ESTROGEN RECEPTOR POSITIVE: ICD-10-CM

## 2025-04-10 DIAGNOSIS — R52 PAIN: ICD-10-CM

## 2025-04-10 DIAGNOSIS — L90.5 SCAR CONDITION AND FIBROSIS OF SKIN: ICD-10-CM

## 2025-04-10 PROCEDURE — 97140 MANUAL THERAPY 1/> REGIONS: CPT | Performed by: OCCUPATIONAL THERAPIST

## 2025-04-10 NOTE — THERAPY TREATMENT NOTE
Outpatient Occupational Therapy Lymphedema Treatment Note  KAELYN Layne     Patient Name: Laila Bell  : 1970  MRN: [id number]  Today's Date: 4/10/2025      Visit Date: 04/10/2025    Patient Active Problem List   Diagnosis    Migraine    Abnormal ECG    Allergic rhinitis    Colon polyps    DM2 (diabetes mellitus, type 2)    Esophageal reflux    Hyperlipidemia    Hypertension    IBS (irritable bowel syndrome)    Pain of left hip joint    Pituitary adenoma    Pituitary tumor    Polyuria    Prolactin increased    S/P selective transsphenoidal pituitary adenomectomy    Tendinitis    Post-COVID-19 syndrome manifesting as chronic headache    Colon cancer screening    Family history of colon cancer    Atypical ductal hyperplasia of breast    Ductal carcinoma in situ (DCIS) of left breast    Intraductal carcinoma in situ of left breast        Past Medical History:   Diagnosis Date    Abnormal ECG 2016    Abnormal Pap smear of cervix     Acid reflux     Allergic rhinitis     Breast mass 2024    Bursitis of left hip 2018    Cholelithiasis 2002    Colon polyps     Constipation     CTS (carpal tunnel syndrome) 1998    Diabetes mellitus, type 2 2019    Diarrhea     DM2 (diabetes mellitus, type 2) 2019    Fatigue     Frequent headaches     Gastroesophageal reflux     GERD (gastroesophageal reflux disease)     Gestational diabetes     Headache, tension-type 1989    History of screening mammography 2019 scheduled    HLD (hyperlipidemia)     HPV in female     Hyperlipidemia     Hypertension     Hypotension     IBS (irritable bowel syndrome)     LGSIL on Pap smear of cervix     LGSIL on Pap smear of cervix     Menorrhagia     Migraine     Mild dysplasia of cervix (DOMINICK I)     Mild dysplasia of cervix (DOMINICK I)     Numbness and tingling     in feet    Pain of left hip joint 2018    Peripheral neuropathy     PONV (postoperative nausea and vomiting)     Screening  mammogram, encounter for 02/2019 2020 SCHEDULED     Sinus trouble     Spinal headache     Tendinitis of left hip 04/06/2018    Urinary frequency     Urinary urgency         Past Surgical History:   Procedure Laterality Date    ABDOMINAL HYSTERECTOMY      BRAIN SURGERY  2019    Pituitary tumor    BREAST BIOPSY  July 2024    Left    BREAST BIOPSY Left 8/30/2024    Procedure: Left breast needle localized lumpectomy, RIGHT BACK CYST EXCISION;  Surgeon: Ana Paula Patterson MD;  [place] [facility] [place] [facility] [place]  Service: General;  Laterality: Left;    BREAST SURGERY  8/30/24    CARPAL TUNNEL RELEASE  1999    CHOLECYSTECTOMY      COLONOSCOPY  2020 2015 & 2020     COLONOSCOPY  2015, 2020    COLONOSCOPY N/A 06/13/2023    Procedure: COLONOSCOPY WITH COLD SNARE POLYPECTOMIES;  Surgeon: Jose David Berry MD;  Location: [facility] ENDOSCOPY;  Service: Gastroenterology;  Laterality: N/A;  COLON POLYPS    COLPOSCOPY  03/23/2011    D & C HYSTEROSCOPY ENDOMETRIAL ABLATION      ENDOMETRIAL ABLATION  2003    ENDOSCOPY  2020    HYSTERECTOMY  2004    LAPAROSCOPIC TUBAL LIGATION W/ FALOPE RING  2003    MAMMO STEREOTACTIC BREAST BIOPSY 1ST W WO DEVICE Right 2007    TONSILLECTOMY  1990    TUMOR REMOVAL  2019    transsphenoidal    UPPER GASTROINTESTINAL ENDOSCOPY  2020         Visit Dx:      ICD-10-CM ICD-9-CM   1. At risk for lymphedema  Z91.89 V49.89   2. Scar condition and fibrosis of skin  L90.5 709.2   3. Joint stiffness  M25.60 719.50   4. Pain  R52 780.96   5. Malignant neoplasm of upper-outer quadrant of left breast in female, estrogen receptor positive  C50.412 174.4    Z17.0 V86.0        Lymphedema       Row Name 04/10/25 1600             Subjective Pain    Able to rate subjective pain? yes  -TD      Pre-Treatment Pain Level 3  -TD      Post-Treatment Pain Level 1  -TD         Subjective    Subjective Comments Pt reports her pain is much better after last treatment  -TD         Lymphedema Assessment    Lymphedema Classification  LUE:;at risk/stage 0  -TD      Lymphedema Cancer Related Sx left;lumpectomy  -TD      Lymphedema Surgery Comments 8/2024  -TD      Radiation Therapy Received yes  -TD      Radiation Treatments #/Timeframe 16  -TD         Manual Lymphatic Drainage    Manual Therapy Therapist completed PORI protocol. to address tightness and swelling in the left chest wall.  Vibration and scaping was used to decrease scaring in the left breast.  -TD                User Key  (r) = Recorded By, (t) = Taken By, (c) = Cosigned By      Initials Name Provider Type    TD Maria Del Carmen Howard OT Occupational Therapist                             OT Assessment/Plan       Row Name 04/10/25 3169          OT Assessment    Functional Limitations Limitations in functional capacity and performance  -TD     Impairments Impaired lymphatic circulation;Joint mobility;Pain;Range of motion  -TD     Assessment Comments Laila is a 54 year old female that under went a left sided lumpectomy in 8/2024. 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  Patient reported that she felt much better after last treatment.  Patient reports that she has a small amount of pain in the left shoulder down into the left sidewall.  Patient tolerated treatment well.  Pt would benefit from continued skilled OT to prevent increased staging of lympehdema, increased pain, and decrease AROM.  -TD     OT Diagnosis At risk for lymphedema  -TD     OT Rehab Potential Good  -TD     Patient/caregiver participated in establishment of treatment plan and goals Yes  -TD     Patient would benefit from skilled therapy intervention Yes  -TD        OT Plan    OT Plan Comments Continue plan of care  -TD               User Key  (r) = Recorded By, (t) = Taken By, (c) = Cosigned By      Initials Name Provider Type    Maria Del Carmen Sotelo OT Occupational Therapist                        Manual Rx (Last 36 Hours)       Manual Treatments       Row Name 04/10/25 9954              Total Minutes    81129 - OT Manual Therapy Minutes 35  -TD                User Key  (r) = Recorded By, (t) = Taken By, (c) = Cosigned By      Initials Name Provider Type    Maria Del Carmen Sotelo OT Occupational Therapist                      Therapy Education  Education Details: Therapist reviewed stretches and MLD  Given: HEP, Symptoms/condition management, Edema management, Pain management  Program: New, Reinforced  How Provided: Verbal, Demonstration  Provided to: Patient  Level of Understanding: Teach back education performed, Verbalized, Demonstrated                Time Calculation:   Timed Charges  55686 - OT Manual Therapy Minutes: 35  Total Minutes  Timed Charges Total Minutes: 35   Total Minutes: 35     Therapy Charges for Today       Code Description Service Date Service Provider Modifiers Qty    [id number]  OT MANUAL THERAPY EA 15 MIN 4/10/2025 Maria Del Carmen Howard OT GO 2                        Maria Del Carmen Howard OT  4/10/2025

## 2025-04-13 DIAGNOSIS — E11.65 TYPE 2 DIABETES MELLITUS WITH HYPERGLYCEMIA, WITHOUT LONG-TERM CURRENT USE OF INSULIN: ICD-10-CM

## 2025-04-14 ENCOUNTER — LAB (OUTPATIENT)
Dept: LAB | Facility: HOSPITAL | Age: 55
End: 2025-04-14
Payer: COMMERCIAL

## 2025-04-14 DIAGNOSIS — R23.2 HOT FLASHES RELATED TO AROMATASE INHIBITOR THERAPY: ICD-10-CM

## 2025-04-14 DIAGNOSIS — T45.1X5A HOT FLASHES RELATED TO AROMATASE INHIBITOR THERAPY: ICD-10-CM

## 2025-04-14 DIAGNOSIS — Z79.899 HIGH RISK MEDICATION USE: ICD-10-CM

## 2025-04-14 DIAGNOSIS — D05.12 DUCTAL CARCINOMA IN SITU (DCIS) OF LEFT BREAST: ICD-10-CM

## 2025-04-14 LAB
ALBUMIN SERPL-MCNC: 4.2 G/DL (ref 3.5–5.2)
ALBUMIN/GLOB SERPL: 1.8 G/DL
ALP SERPL-CCNC: 101 U/L (ref 39–117)
ALT SERPL W P-5'-P-CCNC: 13 U/L (ref 1–33)
ANION GAP SERPL CALCULATED.3IONS-SCNC: 11.1 MMOL/L (ref 5–15)
AST SERPL-CCNC: 18 U/L (ref 1–32)
BILIRUB SERPL-MCNC: 1 MG/DL (ref 0–1.2)
BUN SERPL-MCNC: 8 MG/DL (ref 6–20)
BUN/CREAT SERPL: 10.7 (ref 7–25)
CALCIUM SPEC-SCNC: 9.4 MG/DL (ref 8.6–10.5)
CHLORIDE SERPL-SCNC: 98 MMOL/L (ref 98–107)
CO2 SERPL-SCNC: 26.9 MMOL/L (ref 22–29)
CREAT SERPL-MCNC: 0.75 MG/DL (ref 0.57–1)
EGFRCR SERPLBLD CKD-EPI 2021: 95.3 ML/MIN/1.73
GLOBULIN UR ELPH-MCNC: 2.3 GM/DL
GLUCOSE SERPL-MCNC: 256 MG/DL (ref 65–99)
POTASSIUM SERPL-SCNC: 3.7 MMOL/L (ref 3.5–5.2)
PROT SERPL-MCNC: 6.5 G/DL (ref 6–8.5)
SODIUM SERPL-SCNC: 136 MMOL/L (ref 136–145)

## 2025-04-14 PROCEDURE — 36415 COLL VENOUS BLD VENIPUNCTURE: CPT

## 2025-04-14 PROCEDURE — 80053 COMPREHEN METABOLIC PANEL: CPT

## 2025-04-14 RX ORDER — SEMAGLUTIDE 1.34 MG/ML
INJECTION, SOLUTION SUBCUTANEOUS
Qty: 3 ML | Refills: 0 | Status: SHIPPED | OUTPATIENT
Start: 2025-04-14

## 2025-04-17 ENCOUNTER — HOSPITAL ENCOUNTER (OUTPATIENT)
Facility: HOSPITAL | Age: 55
Setting detail: THERAPIES SERIES
Discharge: HOME | End: 2025-04-17
Payer: COMMERCIAL

## 2025-04-17 DIAGNOSIS — R52 PAIN: ICD-10-CM

## 2025-04-17 DIAGNOSIS — C50.412 MALIGNANT NEOPLASM OF UPPER-OUTER QUADRANT OF LEFT BREAST IN FEMALE, ESTROGEN RECEPTOR POSITIVE: ICD-10-CM

## 2025-04-17 DIAGNOSIS — L90.5 SCAR CONDITION AND FIBROSIS OF SKIN: ICD-10-CM

## 2025-04-17 DIAGNOSIS — M25.60 JOINT STIFFNESS: ICD-10-CM

## 2025-04-17 DIAGNOSIS — Z91.89 AT RISK FOR LYMPHEDEMA: Primary | ICD-10-CM

## 2025-04-17 DIAGNOSIS — Z17.0 MALIGNANT NEOPLASM OF UPPER-OUTER QUADRANT OF LEFT BREAST IN FEMALE, ESTROGEN RECEPTOR POSITIVE: ICD-10-CM

## 2025-04-17 PROCEDURE — 97140 MANUAL THERAPY 1/> REGIONS: CPT | Performed by: OCCUPATIONAL THERAPIST

## 2025-04-17 RX ORDER — TAMOXIFEN CITRATE 20 MG/1
20 TABLET ORAL DAILY
Qty: 30 TABLET | Refills: 1 | Status: SHIPPED | OUTPATIENT
Start: 2025-04-17

## 2025-04-17 NOTE — THERAPY TREATMENT NOTE
[facility] Medicine Daily Progress Note    Date of Service  1/24/2021    Chief Complaint  77 y.o. male admitted 1/17/2021 with encephalopathy.    Hospital Course  Admitted with encephalopathy, known history of frailty syndrome and frequent falls.  Encephalopathy has resolved.  Physical therapy and Occupational Therapy have evaluated him and recommended supervision.  Social work working on discharge planning on long-term skilled nurse facility versus a group home.  Has known history of urinary retention, a Clement catheter was initially placed, he was restarted back on Flomax and Proscar, his Clement catheter was discontinued.    Interval Problem Update  Encephalopathy - resolved  Falls - PT and OT evaluations done  Diabetes - 150s    Consultants/Specialty  None    Code Status  DNAR/DNI    Disposition  Long Term SNF vs Group Home    Review of Systems  Review of Systems   Constitutional: Negative for chills, diaphoresis, fever and malaise/fatigue.   HENT: Negative for congestion, hearing loss and sore throat.    Eyes: Negative for blurred vision.   Respiratory: Negative for cough, shortness of breath and wheezing.    Cardiovascular: Negative for chest pain, palpitations and leg swelling.   Gastrointestinal: Negative for abdominal pain, diarrhea, heartburn, nausea and vomiting.   Genitourinary: Positive for frequency. Negative for dysuria, flank pain and hematuria.   Musculoskeletal: Positive for falls. Negative for back pain, joint pain, myalgias and neck pain.   Skin: Negative for rash.   Neurological: Positive for weakness. Negative for dizziness, sensory change, speech change, focal weakness and headaches.   Psychiatric/Behavioral: The patient is not nervous/anxious.         Physical Exam  Temp:  [36.2 °C (97.2 °F)-36.6 °C (97.9 °F)] 36.3 °C (97.3 °F)  Pulse:  [78-80] 78  Resp:  [16-18] 16  BP: ()/(40-61) 101/40  SpO2:  [91 %-96 %] 95 %    Physical Exam  Vitals signs and nursing note reviewed.   HENT:      Head:  Outpatient Occupational Therapy Lymphedema Treatment Note  KAELYN Layne     Patient Name: Laila Bell  : 1970  MRN: [id number]  Today's Date: 2025      Visit Date: 2025    Patient Active Problem List   Diagnosis    Migraine    Abnormal ECG    Allergic rhinitis    Colon polyps    DM2 (diabetes mellitus, type 2)    Esophageal reflux    Hyperlipidemia    Hypertension    IBS (irritable bowel syndrome)    Pain of left hip joint    Pituitary adenoma    Pituitary tumor    Polyuria    Prolactin increased    S/P selective transsphenoidal pituitary adenomectomy    Tendinitis    Post-COVID-19 syndrome manifesting as chronic headache    Colon cancer screening    Family history of colon cancer    Atypical ductal hyperplasia of breast    Ductal carcinoma in situ (DCIS) of left breast    Intraductal carcinoma in situ of left breast        Past Medical History:   Diagnosis Date    Abnormal ECG 2016    Abnormal Pap smear of cervix     Acid reflux     Allergic rhinitis     Breast mass 2024    Bursitis of left hip 2018    Cholelithiasis 2002    Colon polyps     Constipation     CTS (carpal tunnel syndrome) 1998    Diabetes mellitus, type 2 2019    Diarrhea     DM2 (diabetes mellitus, type 2) 2019    Fatigue     Frequent headaches     Gastroesophageal reflux     GERD (gastroesophageal reflux disease)     Gestational diabetes     Headache, tension-type 1989    History of screening mammography 2019 scheduled    HLD (hyperlipidemia)     HPV in female     Hyperlipidemia     Hypertension     Hypotension     IBS (irritable bowel syndrome)     LGSIL on Pap smear of cervix     LGSIL on Pap smear of cervix     Menorrhagia     Migraine     Mild dysplasia of cervix (DOMINICK I)     Mild dysplasia of cervix (DOMINICK I)     Numbness and tingling     in feet    Pain of left hip joint 2018    Peripheral neuropathy     PONV (postoperative nausea and vomiting)     Screening  Normocephalic.      Comments: Laceration at forehead     Nose: No congestion.      Mouth/Throat:      Mouth: Mucous membranes are moist.   Eyes:      Extraocular Movements: Extraocular movements intact.      Conjunctiva/sclera: Conjunctivae normal.      Pupils: Pupils are equal, round, and reactive to light.   Neck:      Musculoskeletal: No muscular tenderness.   Cardiovascular:      Rate and Rhythm: Normal rate and regular rhythm.   Pulmonary:      Effort: Pulmonary effort is normal.      Breath sounds: Normal breath sounds.   Abdominal:      General: Bowel sounds are normal. There is no distension.      Palpations: Abdomen is soft.      Tenderness: There is no abdominal tenderness. There is no guarding or rebound.   Musculoskeletal:      Right lower leg: No edema.      Left lower leg: No edema.   Lymphadenopathy:      Cervical: No cervical adenopathy.   Skin:     General: Skin is warm and dry.   Neurological:      Mental Status: He is alert and oriented to person, place, and time.      Cranial Nerves: No cranial nerve deficit.      Motor: Weakness (MMT BUE 5/5, RLE 4+/5, LLE 4/5) present.         Fluids    Intake/Output Summary (Last 24 hours) at 1/24/2021 0915  Last data filed at 1/24/2021 0740  Gross per 24 hour   Intake 480 ml   Output 1225 ml   Net -745 ml       Laboratory  Recent Labs     01/22/21  0928   WBC 7.9   RBC 4.01*   HEMOGLOBIN 9.4*   HEMATOCRIT 31.9*   MCV 79.6*   MCH 23.4*   MCHC 29.5*   RDW 54.4*   PLATELETCT 206   MPV 9.2     Recent Labs     01/22/21  0928   SODIUM 133*   POTASSIUM 3.8   CHLORIDE 97   CO2 25   GLUCOSE 220*   BUN 27*   CREATININE 1.13   CALCIUM 9.3                   Imaging  US-EXTREMITY VENOUS LOWER BILAT   Final Result      DX-CHEST-PORTABLE (1 VIEW)   Final Result         1. No acute cardiopulmonary abnormalities are identified.      CT-HEAD W/O   Final Result         1.  No evidence of acute intracranial hemorrhage or mass lesion.      2. Cerebral atrophy.           mammogram, encounter for 02/2019 2020 SCHEDULED     Sinus trouble     Spinal headache     Tendinitis of left hip 04/06/2018    Urinary frequency     Urinary urgency         Past Surgical History:   Procedure Laterality Date    ABDOMINAL HYSTERECTOMY      BRAIN SURGERY  2019    Pituitary tumor    BREAST BIOPSY  July 2024    Left    BREAST BIOPSY Left 8/30/2024    Procedure: Left breast needle localized lumpectomy, RIGHT BACK CYST EXCISION;  Surgeon: Ana Paula Patterson MD;  [place] [facility] [place] [facility] [place]  Service: General;  Laterality: Left;    BREAST SURGERY  8/30/24    CARPAL TUNNEL RELEASE  1999    CHOLECYSTECTOMY      COLONOSCOPY  2020 2015 & 2020     COLONOSCOPY  2015, 2020    COLONOSCOPY N/A 06/13/2023    Procedure: COLONOSCOPY WITH COLD SNARE POLYPECTOMIES;  Surgeon: Jose David Berry MD;  Location: [facility] ENDOSCOPY;  Service: Gastroenterology;  Laterality: N/A;  COLON POLYPS    COLPOSCOPY  03/23/2011    D & C HYSTEROSCOPY ENDOMETRIAL ABLATION      ENDOMETRIAL ABLATION  2003    ENDOSCOPY  2020    HYSTERECTOMY  2004    LAPAROSCOPIC TUBAL LIGATION W/ FALOPE RING  2003    MAMMO STEREOTACTIC BREAST BIOPSY 1ST W WO DEVICE Right 2007    TONSILLECTOMY  1990    TUMOR REMOVAL  2019    transsphenoidal    UPPER GASTROINTESTINAL ENDOSCOPY  2020         Visit Dx:      ICD-10-CM ICD-9-CM   1. At risk for lymphedema  Z91.89 V49.89   2. Scar condition and fibrosis of skin  L90.5 709.2   3. Joint stiffness  M25.60 719.50   4. Pain  R52 780.96   5. Malignant neoplasm of upper-outer quadrant of left breast in female, estrogen receptor positive  C50.412 174.4    Z17.0 V86.0        Lymphedema       Row Name 04/17/25 1600             Subjective Pain    Able to rate subjective pain? yes  -TD      Pre-Treatment Pain Level 2  -TD      Post-Treatment Pain Level 0  -TD         Subjective    Subjective Comments Pt reports her pain is much better  -TD         Lymphedema Assessment    Lymphedema Classification LUE:;at risk/stage 0       Assessment/Plan  * Encephalopathy- (present on admission)  Assessment & Plan  Avoid benzodiazepines and anticholinergics  Frequent orientation  Avoid early morning labs  Avoid vital signs during sleep  Ambulate if possible      Frequent falls- (present on admission)  Assessment & Plan  PT and OT    Frailty syndrome in geriatric patient- (present on admission)  Assessment & Plan  PT and OT    Vitamin B12 deficiency- (present on admission)  Assessment & Plan  oral B12  IM b12 given     Urinary retention- (present on admission)  Assessment & Plan  Bladder scan q 6, straight cath prn > 400 cc  Flomax and Proscar    Noncompliance- (present on admission)  Assessment & Plan  counseling    Type 2 diabetes mellitus with hyperglycemia, without long-term current use of insulin (HCC)- (present on admission)  Assessment & Plan  Lantus, SSI      Vitamin D deficiency- (present on admission)  Assessment & Plan  replacement    Pyuria- (present on admission)  Assessment & Plan  No culture done    Microcytic anemia- (present on admission)  Assessment & Plan  Follow cbc       VTE prophylaxis: Lovenox       -TD      Lymphedema Cancer Related Sx left;lumpectomy  -TD      Lymphedema Surgery Comments 8/2024  -TD      Radiation Therapy Received yes  -TD      Radiation Treatments #/Timeframe 16  -TD         Manual Lymphatic Drainage    Manual Therapy Therapist completed PORI procotol to address tightness in left quadrant and to decrease raidaiton fibrosis and decrease volume.  -TD                User Key  (r) = Recorded By, (t) = Taken By, (c) = Cosigned By      Initials Name Provider Type    TD Maria Del Carmen Howard OT Occupational Therapist                             OT Assessment/Plan       Row Name 04/17/25 1644          OT Assessment    Functional Limitations Limitations in functional capacity and performance  -TD     Impairments Impaired lymphatic circulation;Joint mobility;Pain;Range of motion  -TD     Assessment Comments Laila is a 54 year old female that under went a left sided lumpectomy in 8/2024. 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  Pt reports she is feeling much better and is making good progress towards goals.  Pt would benefit from continued skilled OT to prevent increased staging of lympehdema, increased pain, and decrease AROM.  -TD     OT Diagnosis at risk for lymphedema  -TD     OT Rehab Potential Good  -TD     Patient/caregiver participated in establishment of treatment plan and goals Yes  -TD     Patient would benefit from skilled therapy intervention Yes  -TD        OT Plan    OT Plan Comments continue POC  -TD               User Key  (r) = Recorded By, (t) = Taken By, (c) = Cosigned By      Initials Name Provider Type    Maria Del Carmen Sotelo OT Occupational Therapist                        Manual Rx (Last 36 Hours)       Manual Treatments       Row Name 04/17/25 1645             Total Minutes    43900 - OT Manual Therapy Minutes 30  -TD                User Key  (r) = Recorded By, (t) = Taken By, (c) = Cosigned By      Initials Name Provider Type    JEFERSON Howard  LALO Joyce Occupational Therapist                      Therapy Education  Education Details: Therapist reviewed stretches and MLD  Given: HEP, Symptoms/condition management, Edema management, Pain management  Program: Reinforced  How Provided: Verbal, Demonstration  Provided to: Patient  Level of Understanding: Teach back education performed, Verbalized, Demonstrated                Time Calculation:   Timed Charges  66122 - OT Manual Therapy Minutes: 30  Total Minutes  Timed Charges Total Minutes: 30   Total Minutes: 30     Therapy Charges for Today       Code Description Service Date Service Provider Modifiers Qty    [id number]  OT MANUAL THERAPY EA 15 MIN 4/17/2025 Maria Del Carmen Howard OT GO 2                        Maria Del Carmen Howard OT  4/17/2025

## 2025-04-22 ENCOUNTER — HOSPITAL ENCOUNTER (OUTPATIENT)
Facility: HOSPITAL | Age: 55
Setting detail: THERAPIES SERIES
Discharge: HOME | End: 2025-04-22
Payer: COMMERCIAL

## 2025-04-22 DIAGNOSIS — M25.60 JOINT STIFFNESS: ICD-10-CM

## 2025-04-22 DIAGNOSIS — Z91.89 AT RISK FOR LYMPHEDEMA: Primary | ICD-10-CM

## 2025-04-22 DIAGNOSIS — L90.5 SCAR CONDITION AND FIBROSIS OF SKIN: ICD-10-CM

## 2025-04-22 DIAGNOSIS — C50.412 MALIGNANT NEOPLASM OF UPPER-OUTER QUADRANT OF LEFT BREAST IN FEMALE, ESTROGEN RECEPTOR POSITIVE: ICD-10-CM

## 2025-04-22 DIAGNOSIS — R52 PAIN: ICD-10-CM

## 2025-04-22 DIAGNOSIS — Z17.0 MALIGNANT NEOPLASM OF UPPER-OUTER QUADRANT OF LEFT BREAST IN FEMALE, ESTROGEN RECEPTOR POSITIVE: ICD-10-CM

## 2025-04-22 PROCEDURE — 97140 MANUAL THERAPY 1/> REGIONS: CPT

## 2025-04-22 NOTE — THERAPY RE-EVALUATION
Outpatient Occupational Therapy Lymphedema Re-Evaluation  KAELYN Layne     Patient Name: Laila Bell  : 1970  MRN: [id number]  Today's Date: 2025      Visit Date: 2025    Patient Active Problem List   Diagnosis    Migraine    Abnormal ECG    Allergic rhinitis    Colon polyps    DM2 (diabetes mellitus, type 2)    Esophageal reflux    Hyperlipidemia    Hypertension    IBS (irritable bowel syndrome)    Pain of left hip joint    Pituitary adenoma    Pituitary tumor    Polyuria    Prolactin increased    S/P selective transsphenoidal pituitary adenomectomy    Tendinitis    Post-COVID-19 syndrome manifesting as chronic headache    Colon cancer screening    Family history of colon cancer    Atypical ductal hyperplasia of breast    Ductal carcinoma in situ (DCIS) of left breast    Intraductal carcinoma in situ of left breast        Past Medical History:   Diagnosis Date    Abnormal ECG 2016    Abnormal Pap smear of cervix     Acid reflux     Allergic rhinitis     Breast mass 2024    Bursitis of left hip 2018    Cholelithiasis 2002    Colon polyps     Constipation     CTS (carpal tunnel syndrome) 1998    Diabetes mellitus, type 2 2019    Diarrhea     DM2 (diabetes mellitus, type 2) 2019    Fatigue     Frequent headaches     Gastroesophageal reflux     GERD (gastroesophageal reflux disease)     Gestational diabetes     Headache, tension-type 1989    History of screening mammography 2019 scheduled    HLD (hyperlipidemia)     HPV in female     Hyperlipidemia     Hypertension     Hypotension     IBS (irritable bowel syndrome)     LGSIL on Pap smear of cervix     LGSIL on Pap smear of cervix     Menorrhagia     Migraine     Mild dysplasia of cervix (DOMINICK I)     Mild dysplasia of cervix (DOMINICK I)     Numbness and tingling     in feet    Pain of left hip joint 2018    Peripheral neuropathy     PONV (postoperative nausea and vomiting)     Screening  mammogram, encounter for 02/2019 2020 SCHEDULED     Sinus trouble     Spinal headache     Tendinitis of left hip 04/06/2018    Urinary frequency     Urinary urgency         Past Surgical History:   Procedure Laterality Date    ABDOMINAL HYSTERECTOMY      BRAIN SURGERY  2019    Pituitary tumor    BREAST BIOPSY  July 2024    Left    BREAST BIOPSY Left 8/30/2024    Procedure: Left breast needle localized lumpectomy, RIGHT BACK CYST EXCISION;  Surgeon: Ana Paula Patterson MD;  Location: [facility] OR [facility];  Service: General;  Laterality: Left;    BREAST SURGERY  8/30/24    CARPAL TUNNEL RELEASE  1999    CHOLECYSTECTOMY      COLONOSCOPY  2020 2015 & 2020     COLONOSCOPY  2015, 2020    COLONOSCOPY N/A 06/13/2023    Procedure: COLONOSCOPY WITH COLD SNARE POLYPECTOMIES;  Surgeon: Jose David Berry MD;  Location: [facility] ENDOSCOPY;  Service: Gastroenterology;  Laterality: N/A;  COLON POLYPS    COLPOSCOPY  03/23/2011    D & C HYSTEROSCOPY ENDOMETRIAL ABLATION      ENDOMETRIAL ABLATION  2003    ENDOSCOPY  2020    HYSTERECTOMY  2004    LAPAROSCOPIC TUBAL LIGATION W/ FALOPE RING  2003    MAMMO STEREOTACTIC BREAST BIOPSY 1ST W WO DEVICE Right 2007    TONSILLECTOMY  1990    TUMOR REMOVAL  2019    transsphenoidal    UPPER GASTROINTESTINAL ENDOSCOPY  2020         Visit Dx:     ICD-10-CM ICD-9-CM   1. At risk for lymphedema  Z91.89 V49.89   2. Scar condition and fibrosis of skin  L90.5 709.2   3. Joint stiffness  M25.60 719.50   4. Pain  R52 780.96   5. Malignant neoplasm of upper-outer quadrant of left breast in female, estrogen receptor positive  C50.412 174.4    Z17.0 V86.0        Patient History       Row Name 04/22/25 1600             History    Chief Complaint --  Breast care  -      Brief Description of Current Complaint Laila is a 54 year old female that under went a left sided lumpectomy in 8/2024.  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  -         Fall Risk Assessment  "   Any falls in the past year: No  -      Does patient have a fear of falling No  -         Services    Are you currently receiving Home Health services No  -      Do you plan to receive Home Health services in the near future No  -         Daily Activities    Primary Language English  -      Are you able to read Yes  -      Are you able to write Yes  -      How does patient learn best? Listening;Reading;Demonstration;Pictures/Video  -         Safety    Are you being hurt, hit, or frightened by anyone at home or in your life? No  -MH      Are you being neglected by a caregiver No  -      Have you had any of the following issues with N/A  -                User Key  (r) = Recorded By, (t) = Taken By, (c) = Cosigned By      Initials Name Provider Type     Ana Gonsalez OT Occupational Therapist                     Lymphedema       Row Name 04/22/25 1600             Subjective Pain    Able to rate subjective pain? yes  -      Pre-Treatment Pain Level 4  -      Post-Treatment Pain Level 1  -         Subjective    Subjective Comments Patient reports her pain is better and the scar tissue has decreased.  -         Lymphedema Assessment    Lymphedema Classification LUE:;at risk/stage 0  -      Lymphedema Cancer Related Sx left;lumpectomy  -      Lymphedema Surgery Comments 08/2024  -      Radiation Therapy Received yes  -      Radiation Treatments #/Timeframe 16  -MH         LLIS - Physical Concerns    The amount of pain associated with my lymphedema is: 2  -MH      The amount of limb heaviness associated with my lymphedema is: 0  -      The amount of skin tightness associated with my lymphedema is: 1  -MH      The size of my swollen limb(s) seems: 0  -      Lymphedema affects the movement of my swollen limb(s): 0  -      The strength in my swollen limb(s) is: 0  -         LLIS - Psychosocial Concerns    Lymphedema affects my body image (i.e., \"how I think I look\"). 0  -   " "   Lymphedema affects my socializing with others. 0  -      Lymphedema affects my intimate relations with spouse or partner (rate 0 if not applicable 0  -      Lymphedema \"gets me down\" (i.e., depression, frustration, or anger) 0  -      I must rely on others for help due to my lymphedema. 0  -MH      I know what to do to manage my lymphedema 3  -MH         LLIS - Functional Concerns    Lymphedema affects my ability to perform self-care activities (i.e. eating, dressing, hygiene) 0  -MH      Lymphedema affects my ability to perform routine home or work-related activities. 0  -MH      Lymphedema affects my performance of preferred leisure activities. 0  -      Lymphedema affects proper fit of clothing/shoes 0  -      Lymphedema affects my sleep 0  -         Posture/Observations    Posture- WNL Posture is WNL  -         General ROM    GENERAL ROM COMMENTS BUE WFL tightness at end range  -         MMT (Manual Muscle Testing)    General MMT Comments BUE WFL  -         Skin Changes/Observations    Location/Assessment Upper Quadrant  -      Upper Quadrant Conditions left:;intact;clean;dry  -      Upper Quadrant Color/Pigment left:;radiation fibrosis  -      Skin Observations Comment Pt has decreased radiation fibrosis in L breast.  -         Manual Lymphatic Drainage    Manual Therapy Therapist completed trigger point release, with vibration and scraping for tightness and scar tissue management  -         Lymphedema Life Impact Scale Totals    A.  Total Q1 - Q17 (Do not include Q18) 6  -MH      B.  Total number of questions answered (Q1-Q17) 17  -MH      C. Divide A by B 0.35  -MH      D. Multiple C by 25 8.75  -MH                User Key  (r) = Recorded By, (t) = Taken By, (c) = Cosigned By      Initials Name Provider Type     Ana Gonsalez OT Occupational Therapist                            Therapy Education  Education Details: Reviewed scar tissue massage and MLD  Given: HEP, " Symptoms/condition management, Edema management, Pain management  Program: Reinforced  How Provided: Verbal, Demonstration  Level of Understanding: Teach back education performed, Verbalized, Demonstrated      Manual Rx (Last 36 Hours)       Manual Treatments       Row Name 04/22/25 1645             Total Minutes    59304 - OT Manual Therapy Minutes 39  -                User Key  (r) = Recorded By, (t) = Taken By, (c) = Cosigned By      Initials Name Provider Type    Ana Lacey OT Occupational Therapist                   OT Goals       Row Name 04/22/25 1645          Time Calculation    OT Goal Re-Cert Due Date 05/22/25  -               User Key  (r) = Recorded By, (t) = Taken By, (c) = Cosigned By      Initials Name Provider Type    Ana Lacey OT Occupational Therapist                LTG [2]:  90 Days: The patient will report a pain rating of 0/10 or better to improve functional activities .                          STATUS:  ongoing  STG [2]a:  The patient will report a pain rating of 2/10 or better.  STATUS:  ongoing  TREATMENT:  Manual Therapy, Therapeutic Activity, ADL retraining, Neuromuscular Re-education, Therapeutic Exercise, Patient and Family Education, Orthotic Management and training, Modalities: TENS, NMES, Ultrasound, Fluidotherapy Wound dressing as needed, Modalities as needed and Manual Therapy.     Self-Care Limitations                            LTG 3: 90 days:  The patient will demonstrate improved quality of life by achieving a score of 14 on the Lymphedema Life Impact Scale.                      STATUS:  ongoing              STG 3a: 30 days:  The patient will demonstrate improved quality of life by achieving a score of 15 on the Lymphedema Life Impact Scale.                      STATUS:  ongoing  TREATMENT:  Manual therapy, therapeutic exercise, therapeutic activity, ADL retraining, Patient/caregiver education, Modalities: TENS, NMES, Ultrasound, Fluidotherapy, Orthotic  Management and Training, wound dressing as needed.     OT Assessment/Plan       Row Name 04/22/25 1643          OT Assessment    Functional Limitations Limitations in functional capacity and performance  -     Impairments Impaired lymphatic circulation;Joint mobility;Pain;Range of motion  -     Assessment Comments Laila is a 54 year old female that under went a left sided lumpectomy in 8/2024. 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 Pt reports she is feeling much better and is making good progress towards goals. Pt reports she has noticed a decrease in scar tissue and tightness in L breast. Pt would benefit from continued skilled OT to prevent increased staging of lympehdema, increased pain, and decrease AROM.  -     OT Diagnosis at risk for lymphedema  -     OT Rehab Potential Good  -     Patient/caregiver participated in establishment of treatment plan and goals Yes  -     Patient would benefit from skilled therapy intervention Yes  -        OT Plan    OT Frequency 1x/week;2x/week  -     Predicted Duration of Therapy Intervention (OT) 6 weeks  -     Planned CPT's? OT RE-EVAL: 88218;OT MANUAL THERAPY EA 15 MIN: 27900;OT SELF CARE/MGMT/TRAIN 15 MIN: 61010  -     Planned Therapy Interventions (Optional Details) home exercise program;manual therapy techniques;stretching;strengthening;ROM (Range of Motion);prosthetic fitting/training;patient/family education;orthotic fitting/training  -     OT Plan Comments continue POC  -               User Key  (r) = Recorded By, (t) = Taken By, (c) = Cosigned By      Initials Name Provider Type     Ana Gonsalez OT Occupational Therapist                              Time Calculation:   Timed Charges  25336 - OT Manual Therapy Minutes: 39  Total Minutes  Timed Charges Total Minutes: 39   Total Minutes: 39     Therapy Charges for Today       Code Description Service Date Service Provider Modifiers Qty     [id number]  OT MANUAL THERAPY EA 15 MIN 4/22/2025 Ana Gonsalez OT GO 3                      Ana Gonsalez OT  4/22/2025

## 2025-04-24 ENCOUNTER — APPOINTMENT (OUTPATIENT)
Facility: HOSPITAL | Age: 55
End: 2025-04-24
Payer: COMMERCIAL

## 2025-05-05 DIAGNOSIS — T45.1X5A HOT FLASHES RELATED TO AROMATASE INHIBITOR THERAPY: ICD-10-CM

## 2025-05-05 DIAGNOSIS — R23.2 HOT FLASHES RELATED TO AROMATASE INHIBITOR THERAPY: ICD-10-CM

## 2025-05-05 DIAGNOSIS — D05.12 DUCTAL CARCINOMA IN SITU (DCIS) OF LEFT BREAST: ICD-10-CM

## 2025-05-05 RX ORDER — FEZOLINETANT 45 MG/1
45 TABLET, FILM COATED ORAL DAILY
Qty: 30 TABLET | Refills: 3 | Status: SHIPPED | OUTPATIENT
Start: 2025-05-05

## 2025-05-06 ENCOUNTER — HOSPITAL ENCOUNTER (OUTPATIENT)
Facility: HOSPITAL | Age: 55
Setting detail: THERAPIES SERIES
Discharge: HOME | End: 2025-05-06
Payer: COMMERCIAL

## 2025-05-06 DIAGNOSIS — Z91.89 AT RISK FOR LYMPHEDEMA: Primary | ICD-10-CM

## 2025-05-06 DIAGNOSIS — L90.5 SCAR CONDITION AND FIBROSIS OF SKIN: ICD-10-CM

## 2025-05-06 DIAGNOSIS — R52 PAIN: ICD-10-CM

## 2025-05-06 PROCEDURE — 97140 MANUAL THERAPY 1/> REGIONS: CPT

## 2025-05-06 NOTE — THERAPY TREATMENT NOTE
Outpatient Occupational Therapy Lymphedema Treatment Note  KAELYN Layne     Patient Name: Laila Bell  : 1970  MRN: [id number]  Today's Date: 2025      Visit Date: 2025    Patient Active Problem List   Diagnosis    Migraine    Abnormal ECG    Allergic rhinitis    Colon polyps    DM2 (diabetes mellitus, type 2)    Esophageal reflux    Hyperlipidemia    Hypertension    IBS (irritable bowel syndrome)    Pain of left hip joint    Pituitary adenoma    Pituitary tumor    Polyuria    Prolactin increased    S/P selective transsphenoidal pituitary adenomectomy    Tendinitis    Post-COVID-19 syndrome manifesting as chronic headache    Colon cancer screening    Family history of colon cancer    Atypical ductal hyperplasia of breast    Ductal carcinoma in situ (DCIS) of left breast    Intraductal carcinoma in situ of left breast        Past Medical History:   Diagnosis Date    Abnormal ECG 2016    Abnormal Pap smear of cervix     Acid reflux     Allergic rhinitis     Breast mass 2024    Bursitis of left hip 2018    Cholelithiasis 2002    Colon polyps     Constipation     CTS (carpal tunnel syndrome) 1998    Diabetes mellitus, type 2 2019    Diarrhea     DM2 (diabetes mellitus, type 2) 2019    Fatigue     Frequent headaches     Gastroesophageal reflux     GERD (gastroesophageal reflux disease)     Gestational diabetes     Headache, tension-type 1989    History of screening mammography 2019 scheduled    HLD (hyperlipidemia)     HPV in female     Hyperlipidemia     Hypertension     Hypotension     IBS (irritable bowel syndrome)     LGSIL on Pap smear of cervix     LGSIL on Pap smear of cervix     Menorrhagia     Migraine     Mild dysplasia of cervix (DOMINICK I)     Mild dysplasia of cervix (DOMINICK I)     Numbness and tingling     in feet    Pain of left hip joint 2018    Peripheral neuropathy     PONV (postoperative nausea and vomiting)     Screening  mammogram, encounter for 02/2019 2020 SCHEDULED     Sinus trouble     Spinal headache     Tendinitis of left hip 04/06/2018    Urinary frequency     Urinary urgency         Past Surgical History:   Procedure Laterality Date    ABDOMINAL HYSTERECTOMY      BRAIN SURGERY  2019    Pituitary tumor    BREAST BIOPSY  July 2024    Left    BREAST BIOPSY Left 8/30/2024    Procedure: Left breast needle localized lumpectomy, RIGHT BACK CYST EXCISION;  Surgeon: Ana Paula Patterson MD;  Location: [facility];  Service: General;  Laterality: Left;    BREAST SURGERY  8/30/24    CARPAL TUNNEL RELEASE  1999    CHOLECYSTECTOMY      COLONOSCOPY  2020 2015 & 2020     COLONOSCOPY  2015, 2020    COLONOSCOPY N/A 06/13/2023    Procedure: COLONOSCOPY WITH COLD SNARE POLYPECTOMIES;  Surgeon: Jose David Berry MD;  Location: [facility] ENDOSCOPY;  Service: Gastroenterology;  Laterality: N/A;  COLON POLYPS    COLPOSCOPY  03/23/2011    D & C HYSTEROSCOPY ENDOMETRIAL ABLATION      ENDOMETRIAL ABLATION  2003    ENDOSCOPY  2020    HYSTERECTOMY  2004    LAPAROSCOPIC TUBAL LIGATION W/ FALOPE RING  2003    MAMMO STEREOTACTIC BREAST BIOPSY 1ST W WO DEVICE Right 2007    TONSILLECTOMY  1990    TUMOR REMOVAL  2019    transsphenoidal    UPPER GASTROINTESTINAL ENDOSCOPY  2020         Visit Dx:      ICD-10-CM ICD-9-CM   1. At risk for lymphedema  Z91.89 V49.89   2. Scar condition and fibrosis of skin  L90.5 709.2   3. Pain  R52 780.96        Lymphedema       Row Name 05/06/25 1600             Subjective Pain    Able to rate subjective pain? yes  -      Pre-Treatment Pain Level 2  -      Post-Treatment Pain Level 0  -         Subjective    Subjective Comments Patient reports that she thinks she overused her arm last week and has had some numbeness in her forearm. Pt reports that it has improved and she has been wearing a brace.  -         Lymphedema Assessment    Lymphedema Classification LUE:;at risk/stage 0  -MH      Lymphedema Cancer  Related Sx left;lumpectomy  -      Lymphedema Surgery Comments 08/2024  -      Radiation Therapy Received yes  -      Radiation Treatments #/Timeframe 16  -         Manual Lymphatic Drainage    Manual Therapy Therapist completed trigger point release and MLD to L UE with use of vibration and scrapping for scar tissue.  -                User Key  (r) = Recorded By, (t) = Taken By, (c) = Cosigned By      Initials Name Provider Type     Ana Gonsalez OT Occupational Therapist                             OT Assessment/Plan       Row Name 05/06/25 1633          OT Assessment    Functional Limitations Limitations in functional capacity and performance  -     Impairments Impaired lymphatic circulation;Joint mobility;Pain;Range of motion  -     Assessment Comments Laila is a 54 year old female that under went a left sided lumpectomy in 8/2024. 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 Pt reports she has noticed scar tissue reducing and is making good progress towards goals. Pt reports recent onset of numbness in forearm that has improved with rest and wearing a brace. Encouraged to reach out to PCP regarding symptoms.  Pt would benefit from continued skilled OT to prevent increased staging of lympehdema, increased pain, and decrease AROM.  -     OT Diagnosis at risk for lymphedema  -     OT Rehab Potential Good  -     Patient/caregiver participated in establishment of treatment plan and goals Yes  -     Patient would benefit from skilled therapy intervention Yes  -        OT Plan    OT Plan Comments continue POC  -               User Key  (r) = Recorded By, (t) = Taken By, (c) = Cosigned By      Initials Name Provider Type     Ana Gonsalez OT Occupational Therapist                        Manual Rx (Last 36 Hours)       Manual Treatments       Row Name 05/06/25 1635             Total Minutes    69845 - OT Manual Therapy Minutes 38  -                 User Key  (r) = Recorded By, (t) = Taken By, (c) = Cosigned By      Initials Name Provider Type     Ana Gonsalez OT Occupational Therapist                      Therapy Education  Education Details: Reviewed scar tissue massage and stretches  Given: HEP, Symptoms/condition management, Edema management, Pain management  Program: Reinforced  How Provided: Verbal, Demonstration  Provided to: Patient  Level of Understanding: Verbalized, Demonstrated                Time Calculation:   Timed Charges  59817 - OT Manual Therapy Minutes: 38  Total Minutes  Timed Charges Total Minutes: 38   Total Minutes: 38     Therapy Charges for Today       Code Description Service Date Service Provider Modifiers Qty    [id number]  OT MANUAL THERAPY EA 15 MIN 5/6/2025 Ana Gonsalez OT GO 3                        Ana Gonsalez OT  5/6/2025

## 2025-05-08 ENCOUNTER — APPOINTMENT (OUTPATIENT)
Facility: HOSPITAL | Age: 55
End: 2025-05-08
Payer: COMMERCIAL

## 2025-05-13 DIAGNOSIS — G47.00 INSOMNIA, UNSPECIFIED TYPE: ICD-10-CM

## 2025-05-13 DIAGNOSIS — E11.65 TYPE 2 DIABETES MELLITUS WITH HYPERGLYCEMIA, WITHOUT LONG-TERM CURRENT USE OF INSULIN: ICD-10-CM

## 2025-05-13 NOTE — TELEPHONE ENCOUNTER
Upcoming Appts  With Family Medicine (SONIDO Rangel)  06/23/2025 at 10:30 AM  Last Office Visit - This Dept  12/23/2024 Mychal Calvillo APRN

## 2025-05-14 RX ORDER — SEMAGLUTIDE 1.34 MG/ML
INJECTION, SOLUTION SUBCUTANEOUS
Qty: 3 ML | Refills: 0 | Status: SHIPPED | OUTPATIENT
Start: 2025-05-14

## 2025-05-14 RX ORDER — TRAZODONE HYDROCHLORIDE 100 MG/1
100 TABLET ORAL NIGHTLY
Qty: 90 TABLET | Refills: 1 | Status: SHIPPED | OUTPATIENT
Start: 2025-05-14

## 2025-05-16 RX ORDER — HYDROXYZINE HYDROCHLORIDE 25 MG/1
25 TABLET, FILM COATED ORAL 3 TIMES DAILY PRN
Qty: 30 TABLET | Refills: 5 | Status: SHIPPED | OUTPATIENT
Start: 2025-05-16

## 2025-05-22 ENCOUNTER — HOSPITAL ENCOUNTER (OUTPATIENT)
Facility: HOSPITAL | Age: 55
Setting detail: THERAPIES SERIES
Discharge: HOME | End: 2025-05-22
Payer: COMMERCIAL

## 2025-05-22 DIAGNOSIS — C50.412 MALIGNANT NEOPLASM OF UPPER-OUTER QUADRANT OF LEFT BREAST IN FEMALE, ESTROGEN RECEPTOR POSITIVE: ICD-10-CM

## 2025-05-22 DIAGNOSIS — R52 PAIN: ICD-10-CM

## 2025-05-22 DIAGNOSIS — L90.5 SCAR CONDITION AND FIBROSIS OF SKIN: ICD-10-CM

## 2025-05-22 DIAGNOSIS — Z17.0 MALIGNANT NEOPLASM OF UPPER-OUTER QUADRANT OF LEFT BREAST IN FEMALE, ESTROGEN RECEPTOR POSITIVE: ICD-10-CM

## 2025-05-22 DIAGNOSIS — Z91.89 AT RISK FOR LYMPHEDEMA: Primary | ICD-10-CM

## 2025-05-22 DIAGNOSIS — M25.60 JOINT STIFFNESS: ICD-10-CM

## 2025-05-22 PROCEDURE — 97140 MANUAL THERAPY 1/> REGIONS: CPT | Performed by: OCCUPATIONAL THERAPIST

## 2025-05-22 NOTE — THERAPY RE-EVALUATION
Outpatient Occupational Therapy Lymphedema Re-Evaluation  KAELYN Layne     Patient Name: Laila Bell  : 1970  MRN: [id number]  Today's Date: 2025      Visit Date: 2025    Patient Active Problem List   Diagnosis    Migraine    Abnormal ECG    Allergic rhinitis    Colon polyps    DM2 (diabetes mellitus, type 2)    Esophageal reflux    Hyperlipidemia    Hypertension    IBS (irritable bowel syndrome)    Pain of left hip joint    Pituitary adenoma    Pituitary tumor    Polyuria    Prolactin increased    S/P selective transsphenoidal pituitary adenomectomy    Tendinitis    Post-COVID-19 syndrome manifesting as chronic headache    Colon cancer screening    Family history of colon cancer    Atypical ductal hyperplasia of breast    Ductal carcinoma in situ (DCIS) of left breast    Intraductal carcinoma in situ of left breast        Past Medical History:   Diagnosis Date    Abnormal ECG 2016    Abnormal Pap smear of cervix     Acid reflux     Allergic rhinitis     Breast mass 2024    Bursitis of left hip 2018    Cholelithiasis 2002    Colon polyps     Constipation     CTS (carpal tunnel syndrome) 1998    Diabetes mellitus, type 2 2019    Diarrhea     DM2 (diabetes mellitus, type 2) 2019    Fatigue     Frequent headaches     Gastroesophageal reflux     GERD (gastroesophageal reflux disease)     Gestational diabetes     Headache, tension-type 1989    History of screening mammography 2019 scheduled    HLD (hyperlipidemia)     HPV in female     Hyperlipidemia     Hypertension     Hypotension     IBS (irritable bowel syndrome)     LGSIL on Pap smear of cervix     LGSIL on Pap smear of cervix     Menorrhagia     Migraine     Mild dysplasia of cervix (DOMINICK I)     Mild dysplasia of cervix (DOMINICK I)     Numbness and tingling     in feet    Pain of left hip joint 2018    Peripheral neuropathy     PONV (postoperative nausea and vomiting)     Screening  mammogram, encounter for 02/2019 2020 SCHEDULED     Sinus trouble     Spinal headache     Tendinitis of left hip 04/06/2018    Urinary frequency     Urinary urgency         Past Surgical History:   Procedure Laterality Date    ABDOMINAL HYSTERECTOMY      BRAIN SURGERY  2019    Pituitary tumor    BREAST BIOPSY  July 2024    Left    BREAST BIOPSY Left 8/30/2024    Procedure: Left breast needle localized lumpectomy, RIGHT BACK CYST EXCISION;  Surgeon: Ana Paula Patterson MD;  Location: [facility] [place];  Service: General;  Laterality: Left;    BREAST SURGERY  8/30/24    CARPAL TUNNEL RELEASE  1999    CHOLECYSTECTOMY      COLONOSCOPY  2020 2015 & 2020     COLONOSCOPY  2015, 2020    COLONOSCOPY N/A 06/13/2023    Procedure: COLONOSCOPY WITH COLD SNARE POLYPECTOMIES;  Surgeon: Jose David Berry MD;  Location: [facility] ENDOSCOPY;  Service: Gastroenterology;  Laterality: N/A;  COLON POLYPS    COLPOSCOPY  03/23/2011    D & C HYSTEROSCOPY ENDOMETRIAL ABLATION      ENDOMETRIAL ABLATION  2003    ENDOSCOPY  2020    HYSTERECTOMY  2004    LAPAROSCOPIC TUBAL LIGATION W/ FALOPE RING  2003    MAMMO STEREOTACTIC BREAST BIOPSY 1ST W WO DEVICE Right 2007    TONSILLECTOMY  1990    TUMOR REMOVAL  2019    transsphenoidal    UPPER GASTROINTESTINAL ENDOSCOPY  2020         Visit Dx:     ICD-10-CM ICD-9-CM   1. At risk for lymphedema  Z91.89 V49.89   2. Scar condition and fibrosis of skin  L90.5 709.2   3. Joint stiffness  M25.60 719.50   4. Pain  R52 780.96   5. Malignant neoplasm of upper-outer quadrant of left breast in female, estrogen receptor positive  C50.412 174.4    Z17.0 V86.0        Patient History       Row Name 05/22/25 1600             History    Chief Complaint --  Breast care  -TD      Brief Description of Current Complaint Laila is a 54 year old female that under went a left sided lumpectomy in 8/2024.  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  -TD         Fall Risk Assessment  "   Any falls in the past year: No  -TD      Does patient have a fear of falling No  -TD         Services    Are you currently receiving Home Health services No  -TD      Do you plan to receive Home Health services in the near future No  -TD         Daily Activities    Primary Language English  -TD      Are you able to read Yes  -TD      Are you able to write Yes  -TD      How does patient learn best? Listening;Reading;Demonstration;Pictures/Video  -TD         Safety    Are you being hurt, hit, or frightened by anyone at home or in your life? No  -TD      Are you being neglected by a caregiver No  -TD      Have you had any of the following issues with N/A  -TD                User Key  (r) = Recorded By, (t) = Taken By, (c) = Cosigned By      Initials Name Provider Type    Maria Del Carmen Sotelo OT Occupational Therapist                     Lymphedema       Row Name 05/22/25 1600             Subjective Pain    Able to rate subjective pain? yes  -TD      Pre-Treatment Pain Level 1  -TD      Post-Treatment Pain Level 0  -TD         Subjective    Subjective Comments Pt reports that she felt better after treatment  -TD         Lymphedema Assessment    Lymphedema Classification LUE:;at risk/stage 0  -TD      Lymphedema Cancer Related Sx left;lumpectomy  -TD      Lymphedema Surgery Comments 8/2024  -TD      Radiation Therapy Received yes  -TD      Radiation Treatments #/Timeframe 16  -TD         LLIS - Physical Concerns    The amount of pain associated with my lymphedema is: 1  -TD      The amount of limb heaviness associated with my lymphedema is: 0  -TD      The amount of skin tightness associated with my lymphedema is: 0  -TD      The size of my swollen limb(s) seems: 0  -TD      Lymphedema affects the movement of my swollen limb(s): 0  -TD      The strength in my swollen limb(s) is: 0  -TD         LLIS - Psychosocial Concerns    Lymphedema affects my body image (i.e., \"how I think I look\"). 0  -TD      Lymphedema affects my " "socializing with others. 0  -TD      Lymphedema affects my intimate relations with spouse or partner (rate 0 if not applicable 0  -TD      Lymphedema \"gets me down\" (i.e., depression, frustration, or anger) 0  -TD      I must rely on others for help due to my lymphedema. 0  -TD      I know what to do to manage my lymphedema 2  -TD         LLIS - Functional Concerns    Lymphedema affects my ability to perform self-care activities (i.e. eating, dressing, hygiene) 0  -TD      Lymphedema affects my ability to perform routine home or work-related activities. 0  -TD      Lymphedema affects my performance of preferred leisure activities. 0  -TD      Lymphedema affects proper fit of clothing/shoes 0  -TD      Lymphedema affects my sleep 0  -TD         Posture/Observations    Posture- WNL Posture is WNL  -TD         General ROM    GENERAL ROM COMMENTS BUE are WFL  -TD         MMT (Manual Muscle Testing)    General MMT Comments BUE are WFL  -TD         Skin Changes/Observations    Location/Assessment Upper Quadrant  -TD      Upper Quadrant Conditions left:;intact;clean;dry  -TD      Upper Quadrant Color/Pigment left:;radiation fibrosis  -TD      Skin Observations Comment Pt radiation fibrosis has greatly  and her AROM is back to within functional limits.  -TD         Manual Lymphatic Drainage    Manual Therapy Therapist completed PORI protocol to address tightness in the left quadrant and vibration to address scar tissue.  -TD         Lymphedema Life Impact Scale Totals    A.  Total Q1 - Q17 (Do not include Q18) 3  -TD      B.  Total number of questions answered (Q1-Q17) 17  -TD      C. Divide A by B 0.18  -TD      D. Multiple C by 25 4.5  -TD                User Key  (r) = Recorded By, (t) = Taken By, (c) = Cosigned By      Initials Name Provider Type    TD Maria Del Carmen Howard OT Occupational Therapist                            Therapy Education  Education Details: Reviewed scar massage with vibration  Given: HEP, " Symptoms/condition management, Edema management, Pain management  Program: Reinforced  How Provided: Verbal, Demonstration  Provided to: Patient  Level of Understanding: Verbalized, Demonstrated      Manual Rx (Last 36 Hours)       Manual Treatments       Row Name 05/22/25 1639             Total Minutes    07924 - OT Manual Therapy Minutes 25  -TD                User Key  (r) = Recorded By, (t) = Taken By, (c) = Cosigned By      Initials Name Provider Type    Maria Del Carmen Sotelo OT Occupational Therapist                   OT Goals       Row Name 05/22/25 1639          Time Calculation    OT Goal Re-Cert Due Date 06/21/25  -TD               User Key  (r) = Recorded By, (t) = Taken By, (c) = Cosigned By      Initials Name Provider Type    Maria Del Carmen Sotelo, LALO Occupational Therapist                  LTG [2]:  90 Days: The patient will report a pain rating of 0/10 or better to improve functional activities .                          STATUS:  ongoing  STG [2]a:  The patient will report a pain rating of 2/10 or better.  STATUS:  ongoing  TREATMENT:  Manual Therapy, Therapeutic Activity, ADL retraining, Neuromuscular Re-education, Therapeutic Exercise, Patient and Family Education, Orthotic Management and training, Modalities: TENS, NMES, Ultrasound, Fluidotherapy Wound dressing as needed, Modalities as needed and Manual Therapy.     Self-Care Limitations                            LTG 3: 90 days:  The patient will demonstrate improved quality of life by achieving a score of 14 on the Lymphedema Life Impact Scale.                      STATUS:  ongoing              STG 3a: 30 days:  The patient will demonstrate improved quality of life by achieving a score of 15 on the Lymphedema Life Impact Scale.                      STATUS:  ongoing  TREATMENT:  Manual therapy, therapeutic exercise, therapeutic activity, ADL retraining, Patient/caregiver education, Modalities: TENS, NMES, Ultrasound, Fluidotherapy, Orthotic Management and  Training, wound dressing as needed.   OT Assessment/Plan       Row Name 05/22/25 1633          OT Assessment    Functional Limitations Limitations in functional capacity and performance  -TD     Impairments Impaired lymphatic circulation;Joint mobility;Pain;Range of motion  -TD     Assessment Comments Laila is a 54 year old female that under went a left sided lumpectomy in 8/2024. 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 Pt reports that she has noticed a great decrease of scar tissue in her chest wall and her AROM is back to WFL.  Pt will be placed on a 6 month hold to ensure carry over with instructions to call and return to clinic with any future questions or concerns. Pt would benefit from continued skilled OT to prevent increased staging of lympehdema, increased pain, and decrease AROM.  -TD     OT Diagnosis at risk for lymphedema  -TD     OT Rehab Potential Good  -TD     Patient/caregiver participated in establishment of treatment plan and goals Yes  -TD     Patient would benefit from skilled therapy intervention Yes  -TD        OT Plan    OT Frequency --  see duration  -TD     Predicted Duration of Therapy Intervention (OT) Pt will be seen in 6 months  -TD     Planned CPT's? OT RE-EVAL: 03758;OT MANUAL THERAPY EA 15 MIN: 98635;OT SELF CARE/MGMT/TRAIN 15 MIN: 29586  -TD     Planned Therapy Interventions (Optional Details) home exercise program;manual therapy techniques;stretching;strengthening;ROM (Range of Motion);prosthetic fitting/training;patient/family education;orthotic fitting/training  -TD     OT Plan Comments continue POC  -TD               User Key  (r) = Recorded By, (t) = Taken By, (c) = Cosigned By      Initials Name Provider Type    Maria Del Carmen Sotelo OT Occupational Therapist                              Time Calculation:   Timed Charges  41320 - OT Manual Therapy Minutes: 25  Total Minutes  Timed Charges Total Minutes: 25   Total Minutes: 25      Therapy Charges for Today       Code Description Service Date Service Provider Modifiers Qty    71824087200 HC OT MANUAL THERAPY EA 15 MIN 5/22/2025 Maria Del Carmen Howard OT GO 2                      Maria Del Carmen Howard OT  5/22/2025

## 2025-05-29 DIAGNOSIS — Z12.31 ENCOUNTER FOR SCREENING MAMMOGRAM FOR MALIGNANT NEOPLASM OF BREAST: Primary | ICD-10-CM

## 2025-06-03 RX ORDER — GALCANEZUMAB 120 MG/ML
INJECTION, SOLUTION SUBCUTANEOUS
Qty: 3 ML | OUTPATIENT
Start: 2025-06-03

## 2025-06-03 NOTE — TELEPHONE ENCOUNTER
Patient has an appt with the provider on 06/19/2025. The provider will discuss refills at this time.

## 2025-06-05 ENCOUNTER — OFFICE VISIT (OUTPATIENT)
Dept: ONCOLOGY | Facility: HOSPITAL | Age: 55
End: 2025-06-05
Payer: COMMERCIAL

## 2025-06-05 ENCOUNTER — PATIENT MESSAGE (OUTPATIENT)
Dept: FAMILY MEDICINE CLINIC | Facility: CLINIC | Age: 55
End: 2025-06-05
Payer: COMMERCIAL

## 2025-06-05 VITALS
RESPIRATION RATE: 18 BRPM | BODY MASS INDEX: 26.78 KG/M2 | TEMPERATURE: 98.1 F | OXYGEN SATURATION: 100 % | DIASTOLIC BLOOD PRESSURE: 74 MMHG | SYSTOLIC BLOOD PRESSURE: 127 MMHG | HEIGHT: 62 IN | HEART RATE: 65 BPM | WEIGHT: 145.5 LBS

## 2025-06-05 DIAGNOSIS — R23.2 HOT FLASHES: ICD-10-CM

## 2025-06-05 DIAGNOSIS — R53.83 OTHER FATIGUE: Primary | ICD-10-CM

## 2025-06-05 DIAGNOSIS — G43.019 INTRACTABLE MIGRAINE WITHOUT AURA AND WITHOUT STATUS MIGRAINOSUS: Primary | ICD-10-CM

## 2025-06-05 DIAGNOSIS — D05.12 DUCTAL CARCINOMA IN SITU (DCIS) OF LEFT BREAST: ICD-10-CM

## 2025-06-05 DIAGNOSIS — L29.9 ITCHING: ICD-10-CM

## 2025-06-05 PROCEDURE — G0463 HOSPITAL OUTPT CLINIC VISIT: HCPCS | Performed by: INTERNAL MEDICINE

## 2025-06-05 RX ORDER — TAMOXIFEN CITRATE 20 MG/1
20 TABLET ORAL DAILY
Qty: 90 TABLET | Refills: 1 | Status: SHIPPED | OUTPATIENT
Start: 2025-06-05

## 2025-06-05 RX ORDER — GALCANEZUMAB 120 MG/ML
120 INJECTION, SOLUTION SUBCUTANEOUS
Qty: 1 ML | Refills: 5 | Status: SHIPPED | OUTPATIENT
Start: 2025-06-05

## 2025-06-05 NOTE — PROGRESS NOTES
Chief Complaint  Atypical ductal hyperplasia of breast    Mychal Calvillo, Mychal Hurley, SONIDO    Subjective          Laila Gardenia Bell presents to [facility] for DCIS    Patient is a very pleasant 53-year-old female with past medical history of allergic rhinitis, gastroesophageal reflux disease, hypertension, hyperlipidemia, irritable bowel syndrome who presents for oncology follow up regarding DCIS of the left breast.  She underwent MRI of the breast for history of dense breast tissue 7/2/2024 revealed a 0.8 cm enhancing mass in the central left breast, 6 cm from the nipple.  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  She underwent lumpectomy on 8/30/2024 revealing low-grade DCIS measuring 6 mm in greatest dimension with negative margins, ER positive/OK positive.      Interval History  Patient is here for follow up.  Patient unable to tolerate exemestane.  She was therefore switched to tamoxifen.  She started this in April.  She has been on it over a month.  Reports it caused itching.  Atarax was sent in.  She reports this helps.  Itching can be in random places but it is all over her body including her breast.  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 Has limited ROM of left axilla. She is working with occupational therapy for this.  This is certainly helped.  She remains on Veozah.  Denies any hot flashes.  LFTs have remained normal.  She does have fatigue.  Worse at the end of the day.  Does also have some mood changes but these are tolerable.  She is agreeable to starting on tamoxifen for now.    Oncology/Hematology History Overview Note   7/2/24: MRI breast (screening due to family history breast cancer)  revealed a 0.8 cm enhancing mass in the central left breast, 6 cm from the nipple.      7/24/24: Pathology from MRI guided core needle  biopsy revealed atypical ductal hyperplasia with an intraductal papilloma.      8/30/24: Left breast lumpectomy with pathology revealing low-grade DCIS measuring 6 mm in greatest dimension with negative margins, ER positive/MA positive.      9/30/24: Orders written for anastrozole 1 mg daily.     10/3/24: Completed adjuvant radiation.        Ductal carcinoma in situ (DCIS) of left breast   9/30/2024 Initial Diagnosis    Ductal carcinoma in situ (DCIS) of left breast     9/30/2024 Cancer Staged    Staging form: Breast, AJCC 8th Edition  - Pathologic: Stage Unknown (pTis (DCIS), pNX, cM0, ER+, MA+) - Signed by Jed Lowe MD on 9/30/2024     Intraductal carcinoma in situ of left breast   10/3/2024 Initial Diagnosis    Intraductal carcinoma in situ of left breast     10/10/2024 - 10/31/2024 Radiation    Radiation OncologyTreatment Course:  Laila Bell received 4256 cGy in 16 fractions to the left breast.               Review of Systems   Constitutional:  Negative for appetite change, diaphoresis, fatigue, fever, unexpected weight gain and unexpected weight loss.   HENT:  Negative for hearing loss, mouth sores, sore throat, swollen glands, trouble swallowing and voice change.    Eyes:  Negative for blurred vision.   Respiratory:  Negative for cough, shortness of breath and wheezing.    Cardiovascular:  Negative for chest pain and palpitations.   Gastrointestinal:  Negative for abdominal pain, blood in stool, constipation, diarrhea, nausea and vomiting.   Endocrine: Negative for cold intolerance and heat intolerance.   Genitourinary:  Negative for difficulty urinating, dysuria, frequency, hematuria and urinary incontinence.   Musculoskeletal:  Negative for arthralgias, back pain and myalgias.   Skin:  Negative for rash, skin lesions and wound.        Pt has burns on skins from radiation    Neurological:  Negative for dizziness, seizures, weakness, numbness and headache.   Hematological:  Does not  bruise/bleed easily.   Psychiatric/Behavioral:  Negative for depressed mood. The patient is not nervous/anxious.    All other systems reviewed and are negative.    Current Outpatient Medications on File Prior to Visit   Medication Sig Dispense Refill    atorvastatin (LIPITOR) 10 MG tablet TAKE 1 TABLET BY MOUTH EVERY NIGHT AT BEDTIME 90 tablet 1    Calcium Carb-Cholecalciferol (Calcium 600+D) 600-20 MG-MCG tablet Take 1 tablet by mouth Daily. 90 tablet 1    Continuous Glucose Sensor (FreeStyle Los 3 Plus Sensor) Use Every 14 (Fourteen) Days. 2 each 11    hydrOXYzine (ATARAX) 25 MG tablet Take 1 tablet by mouth 3 (Three) Times a Day As Needed for Itching. 30 tablet 5    lansoprazole (PREVACID) 30 MG capsule TAKE 1 CAPSULE BY MOUTH DAILY 90 capsule 5    metoprolol succinate XL (TOPROL-XL) 25 MG 24 hr tablet TAKE 2 TABLETS BY MOUTH DAILY 180 tablet 1    ondansetron (ZOFRAN) 8 MG tablet Take 1 tablet by mouth Every 8 (Eight) Hours As Needed for Nausea or Vomiting. (Patient not taking: Reported on 1/14/2025) 30 tablet 1    Ozempic, 1 MG/DOSE, 4 MG/3ML solution pen-injector INJECT 1MG UNDER THE SKIN ONCE WEEKLY AS DIRECTED 3 mL 0    SUMAtriptan (Imitrex) 100 MG tablet Take 1 tablet by mouth As Needed for Migraine. (Patient not taking: Reported on 3/11/2025) 12 tablet 11    tamoxifen (NOLVADEX) 20 MG chemo tablet Take 1 tablet by mouth Daily. 30 tablet 1    traZODone (DESYREL) 100 MG tablet TAKE 1 TABLET BY MOUTH EVERY NIGHT 90 tablet 1    Veozah 45 MG tablet TAKE 1 TABLET BY MOUTH DAILY 30 tablet 3    [DISCONTINUED] amoxicillin-clavulanate (AUGMENTIN) 875-125 MG per tablet Take 1 tablet by mouth 2 (Two) Times a Day. (Patient not taking: Reported on 3/11/2025) 20 tablet 0    [DISCONTINUED] Emgality 120 MG/ML auto-injector pen Inject 1 mL under the skin into the appropriate area as directed 1 (One) Time.       No current facility-administered medications on file prior to visit.       Allergies   Allergen Reactions     Dulaglutide Itching     Past Medical History:   Diagnosis Date    Abnormal ECG 08/16/2016    Abnormal Pap smear of cervix 2011    Acid reflux     Allergic rhinitis     Breast mass July 2024    Bursitis of left hip 04/06/2018    Cholelithiasis 2002    Colon polyps     Constipation     CTS (carpal tunnel syndrome) 1998    Diabetes mellitus, type 2 05/08/2019    Diarrhea     DM2 (diabetes mellitus, type 2) 05/08/2019    Fatigue     Frequent headaches     Gastroesophageal reflux     GERD (gastroesophageal reflux disease)     Gestational diabetes     Headache, tension-type 1989    History of screening mammography 02/2019 2020 scheduled    HLD (hyperlipidemia)     HPV in female 2013    Hyperlipidemia     Hypertension     Hypotension     IBS (irritable bowel syndrome)     LGSIL on Pap smear of cervix 2012    LGSIL on Pap smear of cervix 2011    Menorrhagia     Migraine     Mild dysplasia of cervix (DOMINICK I)     Mild dysplasia of cervix (DOMINICK I) 2011    Numbness and tingling     in feet    Pain of left hip joint 04/06/2018    Peripheral neuropathy     PONV (postoperative nausea and vomiting)     Screening mammogram, encounter for 02/2019 2020 SCHEDULED     Sinus trouble     Spinal headache     Tendinitis of left hip 04/06/2018    Urinary frequency     Urinary urgency      Past Surgical History:   Procedure Laterality Date    ABDOMINAL HYSTERECTOMY      BRAIN SURGERY  2019    Pituitary tumor    BREAST BIOPSY  July 2024    Left    BREAST BIOPSY Left 8/30/2024    Procedure: Left breast needle localized lumpectomy, RIGHT BACK CYST EXCISION;  Surgeon: Ana Paula Patterson MD;  Location: [facility] OR [facility];  Service: General;  Laterality: Left;    BREAST SURGERY  8/30/24    CARPAL TUNNEL RELEASE  1999    CHOLECYSTECTOMY      COLONOSCOPY  2020 2015 & 2020     COLONOSCOPY  2015, 2020    COLONOSCOPY N/A 06/13/2023    Procedure: COLONOSCOPY WITH COLD SNARE POLYPECTOMIES;  Surgeon: Jose David Berry MD;  Location: [facility]  ENDOSCOPY;  Service: Gastroenterology;  Laterality: N/A;  COLON POLYPS    COLPOSCOPY  03/23/2011    D & C HYSTEROSCOPY ENDOMETRIAL ABLATION      ENDOMETRIAL ABLATION  2003    ENDOSCOPY  2020    HYSTERECTOMY  2004    LAPAROSCOPIC TUBAL LIGATION W/ FALOPE RING  2003    MAMMO STEREOTACTIC BREAST BIOPSY 1ST W WO DEVICE Right 2007    TONSILLECTOMY  1990    TUMOR REMOVAL  2019    transsphenoidal    UPPER GASTROINTESTINAL ENDOSCOPY  2020     Social History     Socioeconomic History    Marital status:    Tobacco Use    Smoking status: Never     Passive exposure: Never    Smokeless tobacco: Never   Vaping Use    Vaping status: Never Used   Substance and Sexual Activity    Alcohol use: Never    Drug use: Never    Sexual activity: Yes     Partners: Male     Birth control/protection: Hysterectomy, Surgical     Family History   Problem Relation Age of Onset    Hypertension Father     Heart disease Father     Diabetes Father     Arthritis Father     Liver disease Father     Cirrhosis Father     Liver cancer Father     Cancer Father         Liver    Breast cancer Mother     Arthritis Mother     Liver disease Mother     Migraines Mother     Neuropathy Mother     Cancer Mother         Inflammatory Breast Cancer    Kidney disease Paternal Grandfather     Heart disease Paternal Grandfather     Colon cancer Paternal Grandfather 40    Colon polyps Paternal Grandfather     Uterine cancer Maternal Grandmother     Migraines Maternal Grandmother     Migraines Maternal Aunt     Migraines Paternal Aunt     Liver cancer Other     Lung cancer Other     Ovarian cancer Neg Hx     Melanoma Neg Hx     Prostate cancer Neg Hx     Deep vein thrombosis Neg Hx        Objective   Physical Exam  Well appearing patient in no acute distress on RA  Anicteric sclerae, no rash on exposed skin  Respirations non-labored  Awake, alert, and oriented x 4. Speech intact. No gross neurologic deficit  Appropriate mood and affect    Vitals:    06/05/25 1048  "  BP: 127/74   Pulse: 65   Resp: 18   Temp: 98.1 °F (36.7 °C)   TempSrc: Temporal   SpO2: 100%   Weight: 66 kg (145 lb 8.1 oz)   Height: 157.5 cm (62.01\")   PainSc: 0-No pain                     PHQ-9 Total Score:              Result Review :   The following data was reviewed by: Jed Lowe MD on 06/05/25:  Lab Results   Component Value Date    HGB 14.5 01/22/2025    HCT 42.7 01/22/2025    MCV 90.7 01/22/2025     01/22/2025    WBC 5.56 01/22/2025    NEUTROABS 3.99 01/22/2025    LYMPHSABS 0.88 01/22/2025    MONOSABS 0.34 01/22/2025    EOSABS 0.31 01/22/2025    BASOSABS 0.03 01/22/2025     Lab Results   Component Value Date    GLUCOSE 256 (H) 04/14/2025    BUN 8 04/14/2025    CREATININE 0.75 04/14/2025     04/14/2025    K 3.7 04/14/2025    CL 98 04/14/2025    CO2 26.9 04/14/2025    CALCIUM 9.4 04/14/2025    PROTEINTOT 6.5 04/14/2025    ALBUMIN 4.2 04/14/2025    BILITOT 1.0 04/14/2025    ALKPHOS 101 04/14/2025    AST 18 04/14/2025    ALT 13 04/14/2025     Lab Results   Component Value Date    MG 2.0 01/22/2025    FREET4 1.15 06/20/2023    TSH 1.470 06/20/2023     Labs personally reviewed. Repeat LFTs normal.     OT notes personally reviewed        No radiology results for the last 30 days.        Assessment and Plan    Diagnoses and all orders for this visit:    1. Other fatigue (Primary)    2. Ductal carcinoma in situ (DCIS) of left breast    3. Hot flashes    4. Itching    Other orders  -     tamoxifen (NOLVADEX) 20 MG chemo tablet; Take 1 tablet by mouth Daily.  Dispense: 90 tablet; Refill: 1          Left breast DCIS  Lumpectomy 8/30/24 with low grade DCIS. ER/GA positive. Completed adjuvant radiation.  Due to hormone receptor positivity remaining breast tissue recommend adjuvant hormonal therapy.  9/30/24 FSH and estradiol confirmed post-menopausal status. Started adjuvant aromatase inhibitor with anastrozole 1 mg daily at that time. Side effects of nausea, headache, insomnia, hot flashes. " 1/14/25: Reports these have improved but she has had more mood instability. Anastrozole held with improvement in mood. Letrozole started, took x 4 weeks but caused increase in migraines. Exemestane not tolerated.     Tamoxifen 20 mg daily started 4/2025. Tolerating well outside of fatigue, mood changes, itching. Plan to continue this.     No longer needs vitamin D and calcium. 10/2024 baseline DEXA scan normal, can repeat q2 years.    RTC 3 months.     Hot flashes  Veozah daily started and has been very helpful. Checked LFTs - normal 1/25, 3/25, and 4/25. Can hold further checks for now.     Itching  2/2 tamoxifen. Atarax as needed has helped. Aquaphor can be used as well.     Fatigue  Defer to PCP on CBC checks. Can be from tamoxifen.    Radiation fibrosis  Following with OT        I spent 25 minutes caring for Laila on this date of service. This time includes time spent by me in the following activities:preparing for the visit, reviewing tests, obtaining and/or reviewing a separately obtained history, performing a medically appropriate examination and/or evaluation , counseling and educating the patient/family/caregiver, ordering medications, tests, or procedures, referring and communicating with other health care professionals , documenting information in the medical record, independently interpreting results and communicating that information with the patient/family/caregiver, and care coordination    Patient Follow Up: 3 months  Patient was given instructions and counseling regarding her condition or for health maintenance advice. Please see specific information pulled into the AVS if appropriate.

## 2025-06-06 DIAGNOSIS — E11.65 TYPE 2 DIABETES MELLITUS WITH HYPERGLYCEMIA, WITHOUT LONG-TERM CURRENT USE OF INSULIN: ICD-10-CM

## 2025-06-06 RX ORDER — DIPHENHYDRAMINE HCL 25 MG
1 TABLET,DISINTEGRATING ORAL DAILY
Qty: 90 TABLET | Refills: 1 | OUTPATIENT
Start: 2025-06-06

## 2025-06-06 RX ORDER — SEMAGLUTIDE 1.34 MG/ML
1 INJECTION, SOLUTION SUBCUTANEOUS WEEKLY
Qty: 3 ML | Refills: 0 | Status: SHIPPED | OUTPATIENT
Start: 2025-06-06

## 2025-06-19 ENCOUNTER — LAB (OUTPATIENT)
Dept: LAB | Facility: HOSPITAL | Age: 55
End: 2025-06-19
Payer: COMMERCIAL

## 2025-06-19 DIAGNOSIS — E11.65 TYPE 2 DIABETES MELLITUS WITH HYPERGLYCEMIA, WITHOUT LONG-TERM CURRENT USE OF INSULIN: ICD-10-CM

## 2025-06-19 LAB
ALBUMIN SERPL-MCNC: 4 G/DL (ref 3.5–5.2)
ALBUMIN UR-MCNC: 2.9 MG/DL
ALBUMIN/GLOB SERPL: 1.7 G/DL
ALP SERPL-CCNC: 91 U/L (ref 39–117)
ALT SERPL W P-5'-P-CCNC: 5 U/L (ref 1–33)
ANION GAP SERPL CALCULATED.3IONS-SCNC: 9 MMOL/L (ref 5–15)
AST SERPL-CCNC: 16 U/L (ref 1–32)
BASOPHILS # BLD AUTO: 0.04 10*3/MM3 (ref 0–0.2)
BASOPHILS NFR BLD AUTO: 0.8 % (ref 0–1.5)
BILIRUB SERPL-MCNC: 0.9 MG/DL (ref 0–1.2)
BUN SERPL-MCNC: 9 MG/DL (ref 6–20)
BUN/CREAT SERPL: 10.2 (ref 7–25)
CALCIUM SPEC-SCNC: 9.3 MG/DL (ref 8.6–10.5)
CHLORIDE SERPL-SCNC: 103 MMOL/L (ref 98–107)
CHOLEST SERPL-MCNC: 132 MG/DL (ref 0–200)
CO2 SERPL-SCNC: 28 MMOL/L (ref 22–29)
CREAT SERPL-MCNC: 0.88 MG/DL (ref 0.57–1)
CREAT UR-MCNC: 492.7 MG/DL
DEPRECATED RDW RBC AUTO: 37.9 FL (ref 37–54)
EGFRCR SERPLBLD CKD-EPI 2021: 78.7 ML/MIN/1.73
EOSINOPHIL # BLD AUTO: 0.45 10*3/MM3 (ref 0–0.4)
EOSINOPHIL NFR BLD AUTO: 9 % (ref 0.3–6.2)
ERYTHROCYTE [DISTWIDTH] IN BLOOD BY AUTOMATED COUNT: 11.5 % (ref 12.3–15.4)
GLOBULIN UR ELPH-MCNC: 2.3 GM/DL
GLUCOSE SERPL-MCNC: 148 MG/DL (ref 65–99)
HBA1C MFR BLD: 5.7 % (ref 4.8–5.6)
HCT VFR BLD AUTO: 39.8 % (ref 34–46.6)
HDLC SERPL-MCNC: 36 MG/DL (ref 40–60)
HGB BLD-MCNC: 13.8 G/DL (ref 12–15.9)
HOLD SPECIMEN: NORMAL
IMM GRANULOCYTES # BLD AUTO: 0.01 10*3/MM3 (ref 0–0.05)
IMM GRANULOCYTES NFR BLD AUTO: 0.2 % (ref 0–0.5)
LDLC SERPL CALC-MCNC: 74 MG/DL (ref 0–100)
LDLC/HDLC SERPL: 2.01 {RATIO}
LYMPHOCYTES # BLD AUTO: 0.97 10*3/MM3 (ref 0.7–3.1)
LYMPHOCYTES NFR BLD AUTO: 19.4 % (ref 19.6–45.3)
MCH RBC QN AUTO: 31.5 PG (ref 26.6–33)
MCHC RBC AUTO-ENTMCNC: 34.7 G/DL (ref 31.5–35.7)
MCV RBC AUTO: 90.9 FL (ref 79–97)
MICROALBUMIN/CREAT UR: 5.9 MG/G (ref 0–29)
MONOCYTES # BLD AUTO: 0.28 10*3/MM3 (ref 0.1–0.9)
MONOCYTES NFR BLD AUTO: 5.6 % (ref 5–12)
NEUTROPHILS NFR BLD AUTO: 3.24 10*3/MM3 (ref 1.7–7)
NEUTROPHILS NFR BLD AUTO: 65 % (ref 42.7–76)
NRBC BLD AUTO-RTO: 0 /100 WBC (ref 0–0.2)
PLATELET # BLD AUTO: 148 10*3/MM3 (ref 140–450)
PMV BLD AUTO: 9.6 FL (ref 6–12)
POTASSIUM SERPL-SCNC: 4.1 MMOL/L (ref 3.5–5.2)
PROT SERPL-MCNC: 6.3 G/DL (ref 6–8.5)
RBC # BLD AUTO: 4.38 10*6/MM3 (ref 3.77–5.28)
SODIUM SERPL-SCNC: 140 MMOL/L (ref 136–145)
TRIGL SERPL-MCNC: 119 MG/DL (ref 0–150)
VLDLC SERPL-MCNC: 22 MG/DL (ref 5–40)
WBC NRBC COR # BLD AUTO: 4.99 10*3/MM3 (ref 3.4–10.8)

## 2025-06-19 PROCEDURE — 85025 COMPLETE CBC W/AUTO DIFF WBC: CPT

## 2025-06-19 PROCEDURE — 82570 ASSAY OF URINE CREATININE: CPT

## 2025-06-19 PROCEDURE — 36415 COLL VENOUS BLD VENIPUNCTURE: CPT

## 2025-06-19 PROCEDURE — 83036 HEMOGLOBIN GLYCOSYLATED A1C: CPT

## 2025-06-19 PROCEDURE — 80053 COMPREHEN METABOLIC PANEL: CPT

## 2025-06-19 PROCEDURE — 82043 UR ALBUMIN QUANTITATIVE: CPT

## 2025-06-19 PROCEDURE — 80061 LIPID PANEL: CPT

## 2025-06-20 ENCOUNTER — LAB (OUTPATIENT)
Dept: LAB | Facility: HOSPITAL | Age: 55
End: 2025-06-20
Payer: COMMERCIAL

## 2025-06-20 DIAGNOSIS — E11.65 TYPE 2 DIABETES MELLITUS WITH HYPERGLYCEMIA, WITHOUT LONG-TERM CURRENT USE OF INSULIN: ICD-10-CM

## 2025-06-20 LAB
BACTERIA UR QL AUTO: ABNORMAL /HPF
BILIRUB UR QL STRIP: NEGATIVE
CLARITY UR: CLEAR
COD CRY URNS QL: PRESENT /HPF
COLOR UR: ABNORMAL
GLUCOSE UR STRIP-MCNC: NEGATIVE MG/DL
HGB UR QL STRIP.AUTO: NEGATIVE
HOLD SPECIMEN: NORMAL
HYALINE CASTS UR QL AUTO: ABNORMAL /LPF
KETONES UR QL STRIP: ABNORMAL
LEUKOCYTE ESTERASE UR QL STRIP.AUTO: ABNORMAL
NITRITE UR QL STRIP: NEGATIVE
PH UR STRIP.AUTO: 5.5 [PH] (ref 5–8)
PROT UR QL STRIP: ABNORMAL
RBC # UR STRIP: ABNORMAL /HPF
REF LAB TEST METHOD: ABNORMAL
SP GR UR STRIP: 1.02 (ref 1–1.03)
SQUAMOUS #/AREA URNS HPF: ABNORMAL /HPF
UROBILINOGEN UR QL STRIP: ABNORMAL
WBC # UR STRIP: ABNORMAL /HPF

## 2025-06-20 PROCEDURE — 81001 URINALYSIS AUTO W/SCOPE: CPT

## 2025-06-23 ENCOUNTER — HOSPITAL ENCOUNTER (OUTPATIENT)
Dept: MAMMOGRAPHY | Facility: HOSPITAL | Age: 55
Discharge: HOME | End: 2025-06-23
Admitting: NURSE PRACTITIONER
Payer: COMMERCIAL

## 2025-06-23 ENCOUNTER — OFFICE VISIT (OUTPATIENT)
Dept: FAMILY MEDICINE CLINIC | Facility: CLINIC | Age: 55
End: 2025-06-23
Payer: COMMERCIAL

## 2025-06-23 VITALS
WEIGHT: 143.8 LBS | SYSTOLIC BLOOD PRESSURE: 132 MMHG | HEART RATE: 74 BPM | BODY MASS INDEX: 26.46 KG/M2 | OXYGEN SATURATION: 99 % | HEIGHT: 62 IN | TEMPERATURE: 97.7 F | DIASTOLIC BLOOD PRESSURE: 68 MMHG

## 2025-06-23 DIAGNOSIS — I10 PRIMARY HYPERTENSION: ICD-10-CM

## 2025-06-23 DIAGNOSIS — Z12.31 ENCOUNTER FOR SCREENING MAMMOGRAM FOR MALIGNANT NEOPLASM OF BREAST: Primary | ICD-10-CM

## 2025-06-23 DIAGNOSIS — R30.0 DYSURIA: ICD-10-CM

## 2025-06-23 DIAGNOSIS — G43.019 INTRACTABLE MIGRAINE WITHOUT AURA AND WITHOUT STATUS MIGRAINOSUS: ICD-10-CM

## 2025-06-23 DIAGNOSIS — E11.65 TYPE 2 DIABETES MELLITUS WITH HYPERGLYCEMIA, WITHOUT LONG-TERM CURRENT USE OF INSULIN: ICD-10-CM

## 2025-06-23 DIAGNOSIS — E78.2 MIXED HYPERLIPIDEMIA: ICD-10-CM

## 2025-06-23 DIAGNOSIS — D05.12 DUCTAL CARCINOMA IN SITU (DCIS) OF LEFT BREAST: ICD-10-CM

## 2025-06-23 PROCEDURE — 77063 BREAST TOMOSYNTHESIS BI: CPT

## 2025-06-23 PROCEDURE — 99214 OFFICE O/P EST MOD 30 MIN: CPT | Performed by: NURSE PRACTITIONER

## 2025-06-23 PROCEDURE — 77067 SCR MAMMO BI INCL CAD: CPT

## 2025-06-23 RX ORDER — GALCANEZUMAB 120 MG/ML
120 INJECTION, SOLUTION SUBCUTANEOUS
Qty: 3 ML | Refills: 1 | Status: SHIPPED | OUTPATIENT
Start: 2025-06-23

## 2025-06-23 NOTE — PROGRESS NOTES
Answers submitted by the patient for this visit:  Diabetes Questionnaire (Submitted on 6/16/2025)  Chief Complaint: Diabetes problem  Diabetes type: type 2  MedicAlert ID: No  Disease duration: 8 Years  Treatment compliance: most of the time  Symptom course: stable  Home blood tests: 1-2 x per day  High score: >200  Below 70: never  blurred vision: No  foot paresthesias: No  foot ulcerations: No  polydipsia: No  polyuria: Yes  weight loss: No  blackouts: No  hospitalization: No  nocturnal hypoglycemia: No  required assistance: No  required glucagon: No  confusion: No  headaches: No  speech difficulty: No  sweats: No  tremors: No  Current diet: generally healthy  Meal planning: carbohydrate counting  Exercise: daily  Eye exam current: Yes  Sees podiatrist: No  Chief Complaint  Hypertension, Hyperlipidemia, and Diabetes    Subjective        Laila Bell presents to [facility]  History of Present Illness  Headache:  has been stress with loss of mom.    Diabetes:  Doing well on medication.    Hyperlipidemia:  Doing well on medication.              Hypertension  Associated symptoms: no blurred vision, no chest pain, no headaches, no palpitations, no shortness of breath, no sweats and no dizziness    End-organ damage: no CVA, no PVD and no retinopathy    Hyperlipidemia  Pertinent negatives include no chest pain or shortness of breath.   Diabetes  Diabetes type:  Type 2  MedicAlert ID: no    Disease duration:  8 years  Associated symptoms:     fatigue and polyuria      no blurred vision, no chest pain, no foot paresthesias, no foot ulcerations, no polydipsia, no weakness and no weight loss    Symptom course:  Stable  Hypoglycemia symptoms:     no confusion, no dizziness, no headaches, no speech difficulty, no sweats and no tremors    Hypoglycemia complications:     no blackouts, no hospitalization, no nocturnal hypoglycemia, no required assistance and no required glucagon injection     Diabetic complications:     no CVA, no PVD and no retinopathy    Treatment compliance:  Most of the time  Monitoring regimen:     Home blood tests:  1-2 x per day  Highest range:  >200  Current diet:  Generally healthy  Meal planning:  Carbohydrate counting  Exercise:  Daily  Eye exam current: yes    Sees podiatrist: no    Headache  Sinusitis  Pertinent negatives include no coughing, headaches or shortness of breath.   Fatigue  Symptoms include fatigue.    Pertinent negative symptoms include no chest pain, no cough, no fever, no headaches, no numbness and no weakness.       The following portions of the patient's history were personally reviewed and updated as appropriate: allergies, current medications, past medical history, past surgical history, past family history, and past social history.     Body mass index is 26.29 kg/m².           Past History:    Medical History: has a past medical history of Abnormal ECG (08/16/2016), Abnormal Pap smear of cervix (2011), Acid reflux, Allergic rhinitis, Breast mass (July 2024), Bursitis of left hip (04/06/2018), Cholelithiasis (2002), Colon polyps, Constipation, CTS (carpal tunnel syndrome) (1998), Diabetes mellitus, type 2 (05/08/2019), Diarrhea, DM2 (diabetes mellitus, type 2) (05/08/2019), Fatigue, Frequent headaches, Gastroesophageal reflux, GERD (gastroesophageal reflux disease), Gestational diabetes, Gestational hypertension (1995), Headache, tension-type (1989), History of screening mammography (02/2019), HLD (hyperlipidemia), HPV in female (2013), Hyperlipidemia, Hypertension, Hypotension, IBS (irritable bowel syndrome), LGSIL on Pap smear of cervix (2012), LGSIL on Pap smear of cervix (2011), Menorrhagia, Migraine, Mild dysplasia of cervix (DOMINICK I), Mild dysplasia of cervix (DOMINICK I) (2011), Numbness and tingling, Pain of left hip joint (04/06/2018), Peripheral neuropathy, PMS (premenstrual syndrome), PONV (postoperative nausea and vomiting), Preeclampsia (1995),  Screening mammogram, encounter for (02/2019), Sinus trouble, Spinal headache, Tendinitis of left hip (04/06/2018), Urinary frequency, Urinary urgency, and Varicella (1976).     Surgical History: has a past surgical history that includes Colonoscopy (2020); Esophagogastroduodenoscopy (2020); Abdominal hysterectomy; Hysterectomy (2004); Tonsillectomy (1990); Cholecystectomy; Mammo stereotactic breast biopsy 1st w wo device (Right, 2007); Colposcopy (03/23/2011); Laparoscopic tubal ligation w/ Falope ring (2003); d & c hysteroscopy endometrial ablation; Colonoscopy (2015, 2020); Carpal tunnel release (1999); Tumor removal (2019); Upper gastrointestinal endoscopy (2020); Colonoscopy (N/A, 06/13/2023); Brain surgery (2019); Endometrial ablation (2003); Breast biopsy (July 2024); Breast surgery (8/30/24); Breast biopsy (Left, 08/30/2024); [place] tooth extraction (1994); Laparoscopic cholecystectomy; and Total vaginal hysterectomy.     Family History: family history includes Arthritis in her father and mother; Breast cancer in her mother; Cancer in her father and mother; Cirrhosis in her father and mother; Colon cancer (age of onset: 40) in her paternal grandfather; Colon polyps in her paternal grandfather; Diabetes in her father; Heart disease in her father and paternal grandfather; Hypertension in her father; Kidney disease in her paternal grandfather; Liver cancer in her father and another family member; Liver disease in her father and mother; Lung cancer in an other family member; Migraines in her maternal aunt, maternal aunt, maternal grandmother, mother, paternal aunt, and paternal aunt; Neuropathy in her mother; Uterine cancer in her maternal grandmother.     Social History: reports that she has never smoked. She has never been exposed to tobacco smoke. She has never used smokeless tobacco. She reports that she does not drink alcohol and does not use drugs.    Allergies: Dulaglutide          Current Outpatient  Medications:     atorvastatin (LIPITOR) 10 MG tablet, TAKE 1 TABLET BY MOUTH EVERY NIGHT AT BEDTIME, Disp: 90 tablet, Rfl: 1    Calcium Carb-Cholecalciferol (Calcium 600+D) 600-20 MG-MCG tablet, Take 1 tablet by mouth Daily., Disp: 90 tablet, Rfl: 1    Continuous Glucose Sensor (FreeStyle Los 3 Plus Sensor), Use Every 14 (Fourteen) Days., Disp: 2 each, Rfl: 11    Emgality 120 MG/ML auto-injector pen, Inject 1 mL under the skin into the appropriate area as directed Every 30 (Thirty) Days., Disp: 3 mL, Rfl: 1    hydrOXYzine (ATARAX) 25 MG tablet, Take 1 tablet by mouth 3 (Three) Times a Day As Needed for Itching., Disp: 30 tablet, Rfl: 5    lansoprazole (PREVACID) 30 MG capsule, TAKE 1 CAPSULE BY MOUTH DAILY, Disp: 90 capsule, Rfl: 5    metoprolol succinate XL (TOPROL-XL) 25 MG 24 hr tablet, TAKE 2 TABLETS BY MOUTH DAILY, Disp: 180 tablet, Rfl: 1    ondansetron (ZOFRAN) 8 MG tablet, Take 1 tablet by mouth Every 8 (Eight) Hours As Needed for Nausea or Vomiting., Disp: 30 tablet, Rfl: 1    Ozempic, 1 MG/DOSE, 4 MG/3ML solution pen-injector, INJECT 1 MG UNDER THE SKIN EVERY WEEK, Disp: 3 mL, Rfl: 0    SUMAtriptan (Imitrex) 100 MG tablet, Take 1 tablet by mouth As Needed for Migraine., Disp: 12 tablet, Rfl: 11    tamoxifen (NOLVADEX) 20 MG chemo tablet, Take 1 tablet by mouth Daily., Disp: 90 tablet, Rfl: 1    traZODone (DESYREL) 100 MG tablet, TAKE 1 TABLET BY MOUTH EVERY NIGHT, Disp: 90 tablet, Rfl: 1    Veozah 45 MG tablet, TAKE 1 TABLET BY MOUTH DAILY, Disp: 30 tablet, Rfl: 3    Medications Discontinued During This Encounter   Medication Reason    Emgality 120 MG/ML auto-injector pen Reorder         Review of Systems   Constitutional:  Positive for fatigue. Negative for fever and unexpected weight loss.   Eyes:  Negative for blurred vision.   Respiratory:  Negative for cough and shortness of breath.    Cardiovascular:  Negative for chest pain, palpitations and leg swelling.   Endocrine: Positive for polyuria.  "Negative for polydipsia.   Neurological:  Negative for dizziness, tremors, speech difficulty, weakness, numbness, headache and confusion.        Objective         Vitals:    06/23/25 1040   BP: 132/68   BP Location: Right arm   Patient Position: Sitting   Cuff Size: Adult   Pulse: 74   Temp: 97.7 °F (36.5 °C)   TempSrc: Temporal   SpO2: 99%   Weight: 65.2 kg (143 lb 12.8 oz)   Height: 157.5 cm (62.01\")     Body mass index is 26.29 kg/m².         Physical Exam  Vitals reviewed.   Constitutional:       Appearance: Normal appearance. She is well-developed.   HENT:      Head: Normocephalic and atraumatic.      Right Ear: External ear normal.      Left Ear: External ear normal.      Mouth/Throat:      Pharynx: No oropharyngeal exudate.   Eyes:      Conjunctiva/sclera: Conjunctivae normal.      Pupils: Pupils are equal, round, and reactive to light.   Cardiovascular:      Rate and Rhythm: Normal rate and regular rhythm.      Heart sounds: Normal heart sounds. No murmur heard.     No friction rub. No gallop.   Pulmonary:      Effort: Pulmonary effort is normal.      Breath sounds: Normal breath sounds. No wheezing or rhonchi.   Abdominal:      General: Bowel sounds are normal. There is no distension.      Palpations: Abdomen is soft.      Tenderness: There is no abdominal tenderness.   Skin:     General: Skin is warm and dry.   Neurological:      Mental Status: She is alert and oriented to person, place, and time.      Cranial Nerves: No cranial nerve deficit.   Psychiatric:         Mood and Affect: Mood and affect normal.         Behavior: Behavior normal.         Thought Content: Thought content normal.         Judgment: Judgment normal.             Result Review :               Assessment and Plan     Diagnoses and all orders for this visit:    1. Encounter for screening mammogram for malignant neoplasm of breast (Primary)    2. Dysuria  -     Urinalysis With Culture If Indicated -; Standing    3. Intractable migraine " without aura and without status migrainosus  -     Emgality 120 MG/ML auto-injector pen; Inject 1 mL under the skin into the appropriate area as directed Every 30 (Thirty) Days.  Dispense: 3 mL; Refill: 1    4. Type 2 diabetes mellitus with hyperglycemia, without long-term current use of insulin  -     CBC & Differential; Future  -     Comprehensive Metabolic Panel; Future  -     Hemoglobin A1c; Future  -     Urinalysis With Culture If Indicated -; Future  -     Lipid Panel; Future  -     Microalbumin / Creatinine Urine Ratio - Urine, Clean Catch; Future    5. Primary hypertension    6. Mixed hyperlipidemia              Follow Up     Return in about 6 months (around 12/23/2025).    Patient was given instructions and counseling regarding her condition or for health maintenance advice. Please see specific information pulled into the AVS if appropriate.

## 2025-06-26 ENCOUNTER — OFFICE VISIT (OUTPATIENT)
Dept: SURGERY | Facility: CLINIC | Age: 55
End: 2025-06-26
Payer: COMMERCIAL

## 2025-06-26 VITALS — HEIGHT: 62 IN | BODY MASS INDEX: 26.31 KG/M2 | WEIGHT: 143 LBS

## 2025-06-26 DIAGNOSIS — Z12.31 ENCOUNTER FOR SCREENING MAMMOGRAM FOR MALIGNANT NEOPLASM OF BREAST: Primary | ICD-10-CM

## 2025-06-26 DIAGNOSIS — Z86.000 HISTORY OF DUCTAL CARCINOMA IN SITU (DCIS) OF BREAST: ICD-10-CM

## 2025-06-26 DIAGNOSIS — R92.333 HETEROGENEOUSLY DENSE TISSUE OF BOTH BREASTS ON MAMMOGRAPHY: ICD-10-CM

## 2025-06-26 NOTE — PROGRESS NOTES
Chief Complaint: Follow-up    Subjective      Follow-up visit       History of Present Illness  Laila Bell is a 54 y.o. female presents to [facility] for annual exam and to discuss results of mammogram.    Patient presents today for annual exam and to discuss results of her mammogram. Patient reports that she is taking tamoxifen.     Patient does have a history of DCIS. She underwent MRI of the breast for history of dense breast tissue 7/2/2024 revealed a 0.8 cm enhancing mass in the central left breast, 6 cm from the nipple. 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 She underwent lumpectomy on 8/30/2024 revealing low-grade DCIS measuring 6 mm in greatest dimension with negative margins, ER positive/NM positive. She complete radiation therapy on 10/2024.  Patient does report that she is with an inverted nipple on her left breast since completing of radiation.  Patient reports that she discussed this with Dr. Perdue who reassured her this was normal after radiation.      6/25:Study Result    Narrative & Impression   MAMMO SCREENING DIGITAL TOMOSYNTHESIS BILATERAL W CAD-     Date of Exam: 6/23/2025 10:09 AM     Indication: Screening.     Comparison: Prior examinations dating back to 2/13/2018     Technique: Routine screening mammogram images were obtained per  protocol.  Tomosynthesis was utilized.  These mammographic images were  interpreted with the assistance of a computer aided detection system.     Breast Density: The breast(s) are heterogenously dense, which may  obscure small masses.     Findings:    No suspicious masses, suspicious microcalcifications, or architectural  distortions are identified. There are localization clips in the right  breast. There are postoperative changes in the medial left breast.     IMPRESSION:  No mammographic evidence of malignancy.  Recommend  annual screening  mammography.     BI-RADS ASSESSMENT: BI-RADS 2. Benign findings.     Note:  It has been reported that there is approximately a 15% false  negative rate in mammography.  Therefore, management of a palpable  abnormality should not be deferred because of a negative mammogram.     6/23/2025 10:41 AM by CLARITA RENDON MD        Objective     Past Medical History:   Diagnosis Date    Abnormal ECG 08/16/2016    Abnormal Pap smear of cervix 2011    Acid reflux     Allergic rhinitis     Breast mass July 2024    Bursitis of left hip 04/06/2018    Cholelithiasis 2002    Colon polyps     Constipation     CTS (carpal tunnel syndrome) 1998    Diabetes mellitus, type 2 05/08/2019    Diarrhea     DM2 (diabetes mellitus, type 2) 05/08/2019    Fatigue     Frequent headaches     Gastroesophageal reflux     GERD (gastroesophageal reflux disease)     Gestational diabetes     Gestational hypertension 1995    Headache, tension-type 1989    History of screening mammography 02/2019 2020 scheduled    HLD (hyperlipidemia)     HPV in female 2013    Hyperlipidemia     Hypertension     Hypotension     IBS (irritable bowel syndrome)     LGSIL on Pap smear of cervix 2012    LGSIL on Pap smear of cervix 2011    Menorrhagia     Migraine     Mild dysplasia of cervix (DOMINICK I)     Mild dysplasia of cervix (DOMINICK I) 2011    Numbness and tingling     in feet    Pain of left hip joint 04/06/2018    Peripheral neuropathy     PMS (premenstrual syndrome)     PONV (postoperative nausea and vomiting)     Preeclampsia 1995    Screening mammogram, encounter for 02/2019 2020 SCHEDULED     Sinus trouble     Spinal headache     Tendinitis of left hip 04/06/2018    Urinary frequency     Urinary urgency     Varicella 1976       Past Surgical History:   Procedure Laterality Date    ABDOMINAL HYSTERECTOMY      BRAIN SURGERY  2019    Pituitary tumor    BREAST BIOPSY  July 2024    Left    BREAST BIOPSY Left 08/30/2024    Procedure: Left breast needle  localized lumpectomy, RIGHT BACK CYST EXCISION;  Surgeon: Ana Paula Patterson MD;  Location: [facility];  Service: General;  Laterality: Left;    BREAST SURGERY  8/30/24    CARPAL TUNNEL RELEASE  1999    CHOLECYSTECTOMY      COLONOSCOPY  2020 2015 & 2020     COLONOSCOPY  2015, 2020    COLONOSCOPY N/A 06/13/2023    Procedure: COLONOSCOPY WITH COLD SNARE POLYPECTOMIES;  Surgeon: Jose David Berry MD;  Location: [facility] END[facility];  Service: Gastroenterology;  Laterality: N/A;  COLON POLYPS    COLPOSCOPY  03/23/2011    D & C HYSTEROSCOPY ENDOMETRIAL ABLATION      ENDOMETRIAL ABLATION  2003    ENDOSCOPY  2020    HYSTERECTOMY  2004    LAPAROSCOPIC CHOLECYSTECTOMY      LAPAROSCOPIC TUBAL LIGATION W/ FALOPE RING  2003    MAMMO STEREOTACTIC BREAST BIOPSY 1ST W WO DEVICE Right 2007    TONSILLECTOMY  1990    TUMOR REMOVAL  2019    transsphenoidal    UPPER GASTROINTESTINAL ENDOSCOPY  2020    VAGINAL HYSTERECTOMY      WISDOM TOOTH EXTRACTION  1994       Outpatient Medications Marked as Taking for the 6/26/25 encounter (Office Visit) with Manuel April, APRN   Medication Sig Dispense Refill    atorvastatin (LIPITOR) 10 MG tablet TAKE 1 TABLET BY MOUTH EVERY NIGHT AT BEDTIME 90 tablet 1    Calcium Carb-Cholecalciferol (Calcium 600+D) 600-20 MG-MCG tablet Take 1 tablet by mouth Daily. 90 tablet 1    Continuous Glucose Sensor (FreeStyle Los 3 Plus Sensor) Use Every 14 (Fourteen) Days. 2 each 11    Emgality 120 MG/ML auto-injector pen Inject 1 mL under the skin into the appropriate area as directed Every 30 (Thirty) Days. 3 mL 1    hydrOXYzine (ATARAX) 25 MG tablet Take 1 tablet by mouth 3 (Three) Times a Day As Needed for Itching. 30 tablet 5    lansoprazole (PREVACID) 30 MG capsule TAKE 1 CAPSULE BY MOUTH DAILY 90 capsule 5    metoprolol succinate XL (TOPROL-XL) 25 MG 24 hr tablet TAKE 2 TABLETS BY MOUTH DAILY 180 tablet 1    ondansetron (ZOFRAN) 8 MG tablet Take 1 tablet by mouth Every 8 (Eight) Hours As Needed for  Nausea or Vomiting. 30 tablet 1    Ozempic, 1 MG/DOSE, 4 MG/3ML solution pen-injector INJECT 1 MG UNDER THE SKIN EVERY WEEK 3 mL 0    SUMAtriptan (Imitrex) 100 MG tablet Take 1 tablet by mouth As Needed for Migraine. 12 tablet 11    tamoxifen (NOLVADEX) 20 MG chemo tablet Take 1 tablet by mouth Daily. 90 tablet 1    traZODone (DESYREL) 100 MG tablet TAKE 1 TABLET BY MOUTH EVERY NIGHT 90 tablet 1    Veozah 45 MG tablet TAKE 1 TABLET BY MOUTH DAILY 30 tablet 3       Allergies   Allergen Reactions    Dulaglutide Itching        Family History   Problem Relation Age of Onset    Hypertension Father     Heart disease Father     Diabetes Father     Arthritis Father     Liver disease Father     Cirrhosis Father     Liver cancer Father     Cancer Father         Liver    Breast cancer Mother     Arthritis Mother     Liver disease Mother     Migraines Mother     Neuropathy Mother     Cancer Mother         Inflammatory Breast Cancer    Cirrhosis Mother     Kidney disease Paternal Grandfather     Heart disease Paternal Grandfather     Colon cancer Paternal Grandfather 40    Colon polyps Paternal Grandfather     Uterine cancer Maternal Grandmother     Migraines Maternal Grandmother     Migraines Maternal Aunt     Migraines Paternal Aunt     Liver cancer Other     Lung cancer Other     Migraines Maternal Aunt     Migraines Paternal Aunt     Ovarian cancer Neg Hx     Melanoma Neg Hx     Prostate cancer Neg Hx     Deep vein thrombosis Neg Hx        Social History     Socioeconomic History    Marital status:    Tobacco Use    Smoking status: Never     Passive exposure: Never    Smokeless tobacco: Never   Vaping Use    Vaping status: Never Used   Substance and Sexual Activity    Alcohol use: Never    Drug use: Never    Sexual activity: Yes     Partners: Male     Birth control/protection: Hysterectomy, Surgical       Review of Systems   Constitutional:  Negative for chills and fever.   Genitourinary:  Negative for amenorrhea,  "breast discharge, breast lump and breast pain.        Vital Signs:   Ht 157.5 cm (62.01\")   Wt 64.9 kg (143 lb)   BMI 26.15 kg/m²      Physical Exam  Vitals and nursing note reviewed.   Constitutional:       General: She is not in acute distress.     Appearance: She is obese. She is not ill-appearing.   HENT:      Head: Normocephalic and atraumatic.   Cardiovascular:      Rate and Rhythm: Normal rate.   Pulmonary:      Effort: Pulmonary effort is normal.      Breath sounds: No stridor.   Abdominal:      Palpations: Abdomen is soft.      Tenderness: There is no guarding.   Musculoskeletal:         General: No deformity. Normal range of motion.   Skin:     General: Skin is warm and dry.      Coloration: Skin is not jaundiced.      Comments: Right breast: On inspection, there are no skin changes to suggest an underlying malignancy.  On palpitation, there are no dominant masses noted.    Left breast: On inspection, no skin changes to suggest underlying malignancy.  On palpitation, there are no dominant masses noted.  Nipple is flat.     Axilla(bilateral) No lymphadenopathy appreciated.   Neurological:      General: No focal deficit present.      Mental Status: She is alert and oriented to person, place, and time.   Psychiatric:         Mood and Affect: Mood normal.         Thought Content: Thought content normal.          Result Review :    []  Laboratory  []  Radiology  []  Pathology  []  Microbiology  []  EKG/Telemetry   []  Cardiology/Vascular   []  Old records  I spent 30 minutes caring for Laila on this date of service. This time includes time spent by me in the following activities: reviewing tests, obtaining and/or reviewing a separately obtained history, performing a medically appropriate examination and/or evaluation, ordering medications, tests, or procedures, and documenting information in the medical record        Assessment and Plan    Diagnoses and all orders for this visit:    1. Encounter for screening " mammogram for malignant neoplasm of breast (Primary)  -     MRI Breast Bilateral Screening With & Without Contrast; Future    2. Heterogeneously dense tissue of both breasts on mammography  -     MRI Breast Bilateral Screening With & Without Contrast; Future    3. History of ductal carcinoma in situ (DCIS) of breast  -     MRI Breast Bilateral Screening With & Without Contrast; Future        Follow Up   Return for MRI breast bilateral; will call with results.    Begin by lying on your back. It is easier to examine all breast tissue if you are lying down.    Place your right hand behind your head. With the middle fingers of your left hand, gently yet firmly press down using small motions to examine the entire right breast.  Next, sit or stand. Feel your armpit, because breast tissue goes into that area.  Gently squeeze the nipple, checking for discharge. Repeat the process on the left breast.  Use one of the patterns shown in the diagram to make sure that you are covering all of the breast tissue.  Breast self-exam             Patient was given instructions and counseling regarding her condition or for health maintenance advice. Please see specific information pulled into the AVS if appropriate.     As always, it has been a pleasure to participate in your patient's care. Please call with questions or concerns.

## 2025-07-02 ENCOUNTER — OFFICE VISIT (OUTPATIENT)
Dept: OBSTETRICS AND GYNECOLOGY | Age: 55
End: 2025-07-02
Payer: COMMERCIAL

## 2025-07-02 VITALS
SYSTOLIC BLOOD PRESSURE: 132 MMHG | HEART RATE: 88 BPM | BODY MASS INDEX: 26.5 KG/M2 | WEIGHT: 144 LBS | DIASTOLIC BLOOD PRESSURE: 64 MMHG | HEIGHT: 62 IN

## 2025-07-02 DIAGNOSIS — Z01.419 ENCOUNTER FOR GYNECOLOGICAL EXAMINATION WITHOUT ABNORMAL FINDING: Primary | ICD-10-CM

## 2025-07-02 NOTE — PROGRESS NOTES
"Well Woman Visit    CC: Annual well woman exam       HPI:   54 y.o. Contraception or HRT: s/p HYST, still has one or both ovaries, benign indications  Menses:  none  Pain:  None  Incontinence concerns: No  Hx of abnormal pap:  No  Pt has no complaints today. Had dcis last year and underwent surgery and radiation.       History: PMHx, Meds, Allergies, PSHx, Social Hx, and POBHx all reviewed and updated.      PHYSICAL EXAM:  /64   Pulse 88   Ht 157.5 cm (62.01\")   Wt 65.3 kg (144 lb)   BMI 26.33 kg/m²   General- NAD, alert and oriented, appropriate  Psych- Normal mood, good memory  Neck- No masses, no thyroid enlargement  CV- Regular rhythm, no murnurs  Resp- CTA to bases, no wheezes  Abdomen- Soft, non distended, non tender, no masses    Breast left-  Bilaterally symmetrical, no masses, non tender, no nipple discharge  Breast right- Bilaterally symmetrical, no masses, non tender, no nipple discharge    External genitalia- Normal female, no lesions  Urethra/meatus- Normal, no masses, non tender, no prolapse  Bladder- Normal, no masses, non tender, no prolapse  Vagina- Normal, no atrophy, no lesions, no discharge, no prolapse  Cvx- Absent  Uterus- Absent  Adnexa- No mass, non tender  Anus/Rectum/Perineum- Normal appearance, no mass, good sphincter tone, no hemorrhoids, no prolapse , Hemoccult neg    Lymphatic- No palpable neck, axillary, or groin nodes  Ext- No edema, no cyanosis    Skin- No lesions, no rashes, no acanthosis nigricans        ASSESSMENT and PLAN:  WWE    Diagnoses and all orders for this visit:    1. Encounter for gynecological examination without abnormal finding (Primary)  -     POC Occult Blood Stool        Domestic violence/abuse screen: negative    Depression screen: no SI    Preventative:   BREAST HEALTH- Monthly self breast exam importance and how to reviewed. MMG and/or MRI (prn) reviewed per society guidelines and her individual history. Mammo/MRI screen: Already up to " date.  CERVICAL CANCER Screening- Reviewed current ASCCP guidelines for screening w and wo cotest HPV, age specific.  Screen: Already up to date.  COLON CANCER Screening- Reviewed current medical society guidelines and options.  Colonoscopy screen:  Already up to date.  SEXUAL HEALTH: Declines STD screening.  VACCINATIONS Recommended: Flu vaccine annually, Zoster vaccine (49yo and older).  Importance discussed, risk being unvaccinated reviewed.  Questions answered  Smoking status- NON SMOKER.  Importance of avoiding second hand smoke.  Follow up PCP/Specialist PMHx and Labs  [facility] : Counseled and evaluated for hereditary cancer testing Provided with QR code, email and phone number to schedule counseling to determine if she qualifies.  I recommend she call our office if she has further questions      She understands the importance of having any ordered tests to be performed in a timely fashion.  She is encouraged to review her results online and/or contact or office if she has questions.     Follow Up:  Return in about 1 year (around 7/2/2026) for Annual physical.      SONIDO Winn  07/02/2025

## 2025-07-07 DIAGNOSIS — E11.65 TYPE 2 DIABETES MELLITUS WITH HYPERGLYCEMIA, WITHOUT LONG-TERM CURRENT USE OF INSULIN: ICD-10-CM

## 2025-07-07 RX ORDER — SEMAGLUTIDE 1.34 MG/ML
1 INJECTION, SOLUTION SUBCUTANEOUS WEEKLY
Qty: 3 ML | Refills: 0 | Status: SHIPPED | OUTPATIENT
Start: 2025-07-07

## 2025-07-11 ENCOUNTER — TELEPHONE (OUTPATIENT)
Dept: OBSTETRICS AND GYNECOLOGY | Age: 55
End: 2025-07-11
Payer: COMMERCIAL

## 2025-07-11 DIAGNOSIS — Z80.49 FAMILY HISTORY OF MALIGNANT NEOPLASM OF GENITAL ORGAN: ICD-10-CM

## 2025-07-11 DIAGNOSIS — Z85.3 PERSONAL HISTORY OF MALIGNANT NEOPLASM OF BREAST: ICD-10-CM

## 2025-07-11 DIAGNOSIS — Z80.0 FAMILY HISTORY OF MALIGNANT NEOPLASM OF GASTROINTESTINAL TRACT: ICD-10-CM

## 2025-07-11 DIAGNOSIS — Z80.3 FAMILY HISTORY OF MALIGNANT NEOPLASM OF BREAST: Primary | ICD-10-CM

## 2025-07-11 NOTE — TELEPHONE ENCOUNTER
Caller: Laila Bell    Relationship to patient: Self    Best call back number: [phone]      Patient is needing: PATIENT IS NEEDING TO SPEAK TO SOMEONE REGARDING GENETIC COUNSELING. PATIENT IS UNABLE TO GET OUT OF POCKET COST FROM [employer] AND INSURANCE NEEDS [employer] NPI NUMBER TO MAKE SURE THEY ARE IN NETWORK. PATIENT IS UNABLE TO GET NPI NUMBER FROM [facility].

## 2025-07-14 NOTE — TELEPHONE ENCOUNTER
Patient contacted and informed of changing lab for hereditary cancer screening labs to [facility]. Patient is scheduled for 07/15/2025 for labs.

## 2025-07-15 ENCOUNTER — CLINICAL SUPPORT (OUTPATIENT)
Dept: OBSTETRICS AND GYNECOLOGY | Age: 55
End: 2025-07-15
Payer: COMMERCIAL

## 2025-07-15 DIAGNOSIS — Z80.49 FAMILY HISTORY OF MALIGNANT NEOPLASM OF GENITAL ORGAN: ICD-10-CM

## 2025-07-15 DIAGNOSIS — Z80.0 FAMILY HISTORY OF MALIGNANT NEOPLASM OF GASTROINTESTINAL TRACT: ICD-10-CM

## 2025-07-15 DIAGNOSIS — Z80.3 FAMILY HISTORY OF MALIGNANT NEOPLASM OF BREAST: ICD-10-CM

## 2025-07-15 DIAGNOSIS — Z85.3 PERSONAL HISTORY OF MALIGNANT NEOPLASM OF BREAST: ICD-10-CM

## 2025-07-27 LAB
Lab: NEGATIVE
Lab: NORMAL

## 2025-07-28 ENCOUNTER — RESULTS FOLLOW-UP (OUTPATIENT)
Dept: OBSTETRICS AND GYNECOLOGY | Age: 55
End: 2025-07-28
Payer: COMMERCIAL

## 2025-08-06 DIAGNOSIS — E11.65 TYPE 2 DIABETES MELLITUS WITH HYPERGLYCEMIA, WITHOUT LONG-TERM CURRENT USE OF INSULIN: ICD-10-CM

## 2025-08-07 RX ORDER — SEMAGLUTIDE 1.34 MG/ML
1 INJECTION, SOLUTION SUBCUTANEOUS WEEKLY
Qty: 3 ML | Refills: 0 | Status: SHIPPED | OUTPATIENT
Start: 2025-08-07

## (undated) DEVICE — MAJOR-LF: Brand: MEDLINE INDUSTRIES, INC.

## (undated) DEVICE — SOL IRRG H2O PL/BG 1000ML STRL

## (undated) DEVICE — GOWN,REINFORCE,POLY,SIRUS,BREATH SLV,XLG: Brand: MEDLINE

## (undated) DEVICE — LINER SURG CANSTR SXN S/RIGD 1500CC

## (undated) DEVICE — GLV SURG SENSICARE PI ORTHO SZ8 LF STRL

## (undated) DEVICE — STERILE POLYISOPRENE POWDER-FREE SURGICAL GLOVES WITH EMOLLIENT COATING: Brand: PROTEXIS

## (undated) DEVICE — ANTIBACTERIAL UNDYED BRAIDED (POLYGLACTIN 910), SYNTHETIC ABSORBABLE SUTURE: Brand: COATED VICRYL

## (undated) DEVICE — INTENDED FOR TISSUE SEPARATION, AND OTHER PROCEDURES THAT REQUIRE A SHARP SURGICAL BLADE TO PUNCTURE OR CUT.: Brand: BARD-PARKER ® CARBON RIB-BACK BLADES

## (undated) DEVICE — SNAR POLYP CAPTIFLEX XS/OVL 11X2.4MM 240CM 1P/U

## (undated) DEVICE — STRIP CLS WND SUTURESTRIP/PLS 0.5X4IN TP1103

## (undated) DEVICE — SOLIDIFIER LIQLOC PLS 1500CC BT

## (undated) DEVICE — BRA COMPR SURG STL119 V/CLOSE/FRNT SZLG WHT

## (undated) DEVICE — CONN JET HYDRA H20 AUXILIARY DISP

## (undated) DEVICE — DRSNG SURG AQUACEL AG/ADVNTGE 9X15CM 3.5X6IN

## (undated) DEVICE — SINGLE-USE BIOPSY FORCEPS: Brand: RADIAL JAW 4

## (undated) DEVICE — ELECTRD BLD EZ CLN MOD XLNG 2.75IN

## (undated) DEVICE — SUT VIC 3/0 SH 27IN J416H

## (undated) DEVICE — Device

## (undated) DEVICE — SLV SCD KN/LEN ADJ EXPRSS BLENDED MD 1P/U

## (undated) DEVICE — GOWN,REINFRCE,POLY,SIRUS,BREATH SLV,XXLG: Brand: MEDLINE

## (undated) DEVICE — SUT PERMAHAND SILK 0 FSL 30IN BLK

## (undated) DEVICE — Device: Brand: DEFENDO AIR/WATER/SUCTION AND BIOPSY VALVE

## (undated) DEVICE — ADHS LIQ MASTISOL 2/3ML

## (undated) DEVICE — PENCL SMOKE/EVAC MEGADYNE TELESCP 10FT

## (undated) DEVICE — THE SINGLE USE ETRAP – POLYP TRAP IS USED FOR SUCTION RETRIEVAL OF ENDOSCOPICALLY REMOVED POLYPS.: Brand: ETRAP